# Patient Record
Sex: FEMALE | Race: WHITE | NOT HISPANIC OR LATINO | Employment: FULL TIME | URBAN - METROPOLITAN AREA
[De-identification: names, ages, dates, MRNs, and addresses within clinical notes are randomized per-mention and may not be internally consistent; named-entity substitution may affect disease eponyms.]

---

## 2021-05-24 ENCOUNTER — OFFICE VISIT (OUTPATIENT)
Dept: URGENT CARE | Facility: CLINIC | Age: 26
End: 2021-05-24

## 2021-05-24 VITALS
OXYGEN SATURATION: 98 % | RESPIRATION RATE: 16 BRPM | TEMPERATURE: 98.6 F | HEART RATE: 82 BPM | DIASTOLIC BLOOD PRESSURE: 80 MMHG | SYSTOLIC BLOOD PRESSURE: 128 MMHG

## 2021-05-24 DIAGNOSIS — J45.20 MILD INTERMITTENT ASTHMA WITHOUT COMPLICATION: ICD-10-CM

## 2021-05-24 DIAGNOSIS — Z71.3 ENCOUNTER FOR WEIGHT LOSS COUNSELING: ICD-10-CM

## 2021-05-24 DIAGNOSIS — J30.2 SEASONAL ALLERGIC RHINITIS, UNSPECIFIED TRIGGER: Primary | ICD-10-CM

## 2021-05-24 RX ORDER — ALBUTEROL SULFATE 90 UG/1
2 AEROSOL, METERED RESPIRATORY (INHALATION) EVERY 6 HOURS PRN
Qty: 18 G | Refills: 0 | Status: SHIPPED | OUTPATIENT
Start: 2021-05-24 | End: 2021-06-23

## 2021-05-24 RX ORDER — FLUTICASONE PROPIONATE 50 MCG
2 SPRAY, SUSPENSION (ML) NASAL DAILY
Qty: 9.9 ML | Refills: 0 | Status: SHIPPED | OUTPATIENT
Start: 2021-05-24 | End: 2021-06-23

## 2021-05-24 NOTE — PATIENT INSTRUCTIONS
Use Flonase nasal spray as prescribed  Continue taking an antihistamine  Use nasal saline rinses and recommend allergen avoidance  Use warm salt water gargles for sore throat  Use Ventolin inhaler if needed  Follow up with your PCP or return to the clinic if symptoms worsen or persist more than 3-5 days  Return tomorrow morning to obtain fasting labwork  Allergic Rhinitis   AMBULATORY CARE:   Allergic rhinitis , or hay fever, is swelling of the inside of your nose  The swelling is a reaction to allergens in the air  An allergen can be anything that causes an allergic reaction  Allergies to weeds, grass, trees, or mold often cause seasonal allergic rhinitis  Indoor dust mites, cockroaches, pet dander, or mold can also cause allergic rhinitis  Common signs and symptoms include the following:   · Sneezing    · Nasal congestion    · Runny nose    · Itchy nose, eyes, or mouth    · Red, watery eyes    · Postnasal drip (nasal drainage down the back of your throat)    · Cough or frequent throat clearing    · Feeling tired or lethargic    · Dark circles under your eyes    Call 911 for the following:   · You have chest pain or shortness of breath  Seek care immediately if:   · You have severe pain  · You cough up blood  Contact your healthcare provider if:   · You have a fever  · You have ear or sinus pain, or a headache  · Your symptoms get worse, even after treatment  · You have yellow, green, brown, or bloody mucus coming from your nose  · Your nose is bleeding or you have pain inside your nose  · You have trouble sleeping because of your symptoms  · You have questions or concerns about your condition or care  Treatment:   · Antihistamines  help reduce itching, sneezing, and a runny nose  Some antihistamines can make you sleepy  · Nasal steroids  help decrease inflammation in your nose  · Decongestants  help clear your stuffy nose      · Immunotherapy  may be needed if your symptoms are severe or other treatments do not work  Immunotherapy is used to inject an allergen into your skin  At first, the therapy contains tiny amounts of the allergen  Your healthcare provider will slowly increase the amount of allergen  This may help your body be less sensitive to the allergen and stop reacting to it  You may need immunotherapy for weeks or longer  Manage allergic rhinitis:  The best way to manage allergic rhinitis is to avoid allergens that can trigger your symptoms  Any of the following may help decrease your symptoms:  · Rinse your nose and sinuses  with a salt water solution or use a salt water nasal spray  This will help thin the mucus in your nose and rinse away pollen and dirt  It will also help reduce swelling so you can breathe normally  Ask your healthcare provider how often to rinse your nose  · Reduce exposure to dust mites  Wash sheets and towels in hot water every week  Cover your pillows and mattresses with allergen-free covers  Limit the number of stuffed animals and soft toys your child has  Wash your child's toys in hot water regularly  Vacuum weekly and use a vacuum  with an air filter  If possible, get rid of carpets and curtains  These collect dust and dust mites  · Reduce exposure to pollen  Keep windows and doors closed in your house and car  Stay inside when air pollution or the pollen count is high  Run your air conditioner on recycle, and change air filters often  Shower and wash your hair before bed every night to rinse away pollen  · Reduce exposure to pet dander  If possible, do not keep cats, dogs, birds, or other pets  If you do keep pets in your home, keep them out of bedrooms and carpeted rooms  Bathe them often  · Reduce exposure to mold  Do not spend time in basements  Choose artificial plants instead of live plants  Keep your home's humidity at less than 45%  Do not have ponds or standing water in your home or yard       · Do not smoke  Avoid others who smoke  Ask your healthcare provider for information if you currently smoke and need help to quit  Follow up with your healthcare provider as directed:  Write down your questions so you remember to ask them during your visits  © Copyright [address] Information is for End User's use only and may not be sold, redistributed or otherwise used for commercial purposes  All illustrations and images included in CareNotes® are the copyrighted property of A D A M , Inc  or [address] [place], [place] [address]spike Zimmer   The above information is an  only  It is not intended as medical advice for individual conditions or treatments  Talk to your doctor, nurse or pharmacist before following any medical regimen to see if it is safe and effective for you

## 2021-05-24 NOTE — ASSESSMENT & PLAN NOTE
History and exam findings consistent with pharyngitis due to post nasal drip  Rx written for Flonase nasal spray  Continue antihistamine  Recommend nasal saline spray and allergen avoidance  Follow up if symptoms worsen or persist

## 2021-05-24 NOTE — PROGRESS NOTES
[facility]        NAME: July Nettles is a 22 y o  female  : 1995    MRN: [id number]  DATE: May 24, 2021  TIME: 2:55 PM    Assessment and Plan   Seasonal allergic rhinitis, unspecified trigger [J30 2]  1  Seasonal allergic rhinitis, unspecified trigger  fluticasone (FLONASE) 50 mcg/act nasal spray   2  Mild intermittent asthma without complication  albuterol (Ventolin HFA) 90 mcg/act inhaler   3  Encounter for weight loss counseling           Patient Instructions     Use Flonase nasal spray as prescribed  Continue taking an antihistamine  Use nasal saline rinses and recommend allergen avoidance  Use warm salt water gargles for sore throat  Use Ventolin inhaler if needed  Follow up with your PCP or return to the clinic if symptoms worsen or persist more than 3-5 days  Return tomorrow morning to obtain fasting labwork  Proceed to  ER if symptoms worsen  Chief Complaint     Chief Complaint   Patient presents with    Sore Throat    Allergies         History of Present Illness       Head congestion, ear fullness & dizziness, post-nasal drip, rhinitis, sore throat, dry cough x 2 days  She moved to this area a few months ago from [place]  Hx asthma, mild asthma symptoms  No fevers or chills  No known sick contacts  She has had her COVID vaccines  She tried an allergy pill- did not help  Pt also would like advice about weight loss  She is eating healthy and exercising daily but has had difficulty loosing weight  Review of Systems   Review of Systems   Constitutional: Negative  HENT: Positive for congestion, postnasal drip, rhinorrhea and sore throat  Negative for sinus pressure, sinus pain and voice change  Eyes: Positive for itching  Respiratory: Positive for cough (dry) and chest tightness (mild- feels like asthma exacerbation)  Negative for wheezing  Cardiovascular: Negative  Musculoskeletal: Negative  Skin: Negative      Allergic/Immunologic: Positive for environmental allergies  All other systems reviewed and are negative  Current Medications       Current Outpatient Medications:     levonorgestrel (MIRENA) 20 MCG/24HR IUD, 1 each by Intrauterine route once, Disp: , Rfl:     albuterol (Ventolin HFA) 90 mcg/act inhaler, Inhale 2 puffs every 6 (six) hours as needed for wheezing, Disp: 18 g, Rfl: 0    fluticasone (FLONASE) 50 mcg/act nasal spray, 2 sprays into each nostril daily, Disp: 9 9 mL, Rfl: 0    Current Allergies     Allergies as of 05/24/2021    (No Known Allergies)            The following portions of the patient's history were reviewed and updated as appropriate: allergies, current medications, past family history, past medical history, past social history, past surgical history and problem list      Past Medical History:   Diagnosis Date    Allergic rhinitis     Asthma     POTS (postural orthostatic tachycardia syndrome)        Past Surgical History:   Procedure Laterality Date    ENDOMETRIAL ABLATION         History reviewed  No pertinent family history  Medications have been verified  Objective   /80 (BP Location: Right arm, Patient Position: Sitting, Cuff Size: Adult)   Pulse 82   Temp 98 6 °F (37 °C) (Tympanic)   Resp 16   SpO2 98%   No LMP recorded  Physical Exam     Physical Exam  Vitals signs reviewed  Constitutional:       Appearance: She is well-developed  HENT:      Head: Normocephalic and atraumatic  Jaw: There is normal jaw occlusion  Right Ear: Hearing, ear canal and external ear normal  A middle ear effusion is present  Left Ear: Hearing, ear canal and external ear normal  A middle ear effusion is present  Nose: Mucosal edema, congestion and rhinorrhea present  Rhinorrhea is clear  Right Turbinates: Swollen  Left Turbinates: Swollen  Left Sinus: No maxillary sinus tenderness  Mouth/Throat:      Lips: Pink        Mouth: Mucous membranes are moist  Dentition: Normal dentition  Pharynx: Uvula midline  Posterior oropharyngeal erythema (cobblestoning appearance to throat) present  No oropharyngeal exudate  Tonsils: No tonsillar exudate or tonsillar abscesses  1+ on the right  1+ on the left  Eyes:      Conjunctiva/sclera: Conjunctivae normal       Pupils: Pupils are equal, round, and reactive to light  Neck:      Musculoskeletal: Normal range of motion and neck supple  Cardiovascular:      Rate and Rhythm: Normal rate and regular rhythm  Heart sounds: Normal heart sounds  Pulmonary:      Effort: Pulmonary effort is normal       Breath sounds: Normal breath sounds  Lymphadenopathy:      Head:      Right side of head: Submandibular adenopathy present  Left side of head: Submandibular adenopathy present  Skin:     General: Skin is warm and dry  Neurological:      Mental Status: She is alert and oriented to person, place, and time     Psychiatric:         Behavior: Behavior normal

## 2021-05-24 NOTE — ASSESSMENT & PLAN NOTE
Weight loss strategies discussed with pt  labwork ordered to r/o medical cause of weight loss difficulty  Recommend pt continue to eat a healthy diet, engage in regular moderate exercise, and start adding moderate weight lifting to her regimen to build muscle

## 2021-05-24 NOTE — ASSESSMENT & PLAN NOTE
Reports of asthma symptoms with seasonal allergies, pt needs a refill of her albuterol inhaler  Asthma normally well controlled  Refill provided for Ventolin inhaler  Reasons to follow up reviewed with pt

## 2021-05-25 ENCOUNTER — APPOINTMENT (OUTPATIENT)
Dept: URGENT CARE | Facility: CLINIC | Age: 26
End: 2021-05-25

## 2021-05-25 DIAGNOSIS — Z71.3 ENCOUNTER FOR WEIGHT LOSS COUNSELING: ICD-10-CM

## 2021-05-25 DIAGNOSIS — Z00.00 PREVENTATIVE HEALTH CARE: Primary | ICD-10-CM

## 2021-05-25 DIAGNOSIS — J45.20 MILD INTERMITTENT ASTHMA WITHOUT COMPLICATION: ICD-10-CM

## 2021-05-25 LAB
25(OH)D3 SERPL-MCNC: 18.8 NG/ML (ref 30–100)
ALBUMIN SERPL BCP-MCNC: 3.7 G/DL (ref 3.5–5)
ALP SERPL-CCNC: 89 U/L (ref 46–116)
ALT SERPL W P-5'-P-CCNC: 35 U/L (ref 12–78)
ANION GAP SERPL CALCULATED.3IONS-SCNC: 6 MMOL/L (ref 4–13)
AST SERPL W P-5'-P-CCNC: 21 U/L (ref 5–45)
BASOPHILS # BLD AUTO: 0.05 THOUSANDS/ΜL (ref 0–0.1)
BASOPHILS NFR BLD AUTO: 1 % (ref 0–1)
BILIRUB SERPL-MCNC: 0.46 MG/DL (ref 0.2–1)
BUN SERPL-MCNC: 13 MG/DL (ref 5–25)
CALCIUM SERPL-MCNC: 8.9 MG/DL (ref 8.3–10.1)
CHLORIDE SERPL-SCNC: 108 MMOL/L (ref 100–108)
CHOLEST SERPL-MCNC: 134 MG/DL (ref 50–200)
CO2 SERPL-SCNC: 23 MMOL/L (ref 21–32)
CREAT SERPL-MCNC: 0.73 MG/DL (ref 0.6–1.3)
EOSINOPHIL # BLD AUTO: 0.17 THOUSAND/ΜL (ref 0–0.61)
EOSINOPHIL NFR BLD AUTO: 2 % (ref 0–6)
ERYTHROCYTE [DISTWIDTH] IN BLOOD BY AUTOMATED COUNT: 12.5 % (ref 11.6–15.1)
GFR SERPL CREATININE-BSD FRML MDRD: 115 ML/MIN/1.73SQ M
GLUCOSE P FAST SERPL-MCNC: 77 MG/DL (ref 65–99)
HCT VFR BLD AUTO: 43.3 % (ref 34.8–46.1)
HDLC SERPL-MCNC: 48 MG/DL
HGB BLD-MCNC: 14.3 G/DL (ref 11.5–15.4)
IMM GRANULOCYTES # BLD AUTO: 0.03 THOUSAND/UL (ref 0–0.2)
IMM GRANULOCYTES NFR BLD AUTO: 0 % (ref 0–2)
LDLC SERPL CALC-MCNC: 75 MG/DL (ref 0–100)
LYMPHOCYTES # BLD AUTO: 2.98 THOUSANDS/ΜL (ref 0.6–4.47)
LYMPHOCYTES NFR BLD AUTO: 31 % (ref 14–44)
MCH RBC QN AUTO: 30 PG (ref 26.8–34.3)
MCHC RBC AUTO-ENTMCNC: 33 G/DL (ref 31.4–37.4)
MCV RBC AUTO: 91 FL (ref 82–98)
MONOCYTES # BLD AUTO: 0.53 THOUSAND/ΜL (ref 0.17–1.22)
MONOCYTES NFR BLD AUTO: 6 % (ref 4–12)
NEUTROPHILS # BLD AUTO: 5.84 THOUSANDS/ΜL (ref 1.85–7.62)
NEUTS SEG NFR BLD AUTO: 60 % (ref 43–75)
NONHDLC SERPL-MCNC: 86 MG/DL
NRBC BLD AUTO-RTO: 0 /100 WBCS
PLATELET # BLD AUTO: 332 THOUSANDS/UL (ref 149–390)
PMV BLD AUTO: 9.6 FL (ref 8.9–12.7)
POTASSIUM SERPL-SCNC: 4 MMOL/L (ref 3.5–5.3)
PROT SERPL-MCNC: 7.9 G/DL (ref 6.4–8.2)
RBC # BLD AUTO: 4.77 MILLION/UL (ref 3.81–5.12)
SODIUM SERPL-SCNC: 137 MMOL/L (ref 136–145)
TRIGL SERPL-MCNC: 54 MG/DL
TSH SERPL DL<=0.05 MIU/L-ACNC: 1.39 UIU/ML (ref 0.36–3.74)
WBC # BLD AUTO: 9.6 THOUSAND/UL (ref 4.31–10.16)

## 2021-05-25 PROCEDURE — 82306 VITAMIN D 25 HYDROXY: CPT | Performed by: NURSE PRACTITIONER

## 2021-05-25 PROCEDURE — 80061 LIPID PANEL: CPT | Performed by: NURSE PRACTITIONER

## 2021-05-25 PROCEDURE — 85025 COMPLETE CBC W/AUTO DIFF WBC: CPT | Performed by: NURSE PRACTITIONER

## 2021-05-25 PROCEDURE — 84443 ASSAY THYROID STIM HORMONE: CPT | Performed by: NURSE PRACTITIONER

## 2021-05-25 PROCEDURE — 80053 COMPREHEN METABOLIC PANEL: CPT | Performed by: NURSE PRACTITIONER

## 2021-05-25 NOTE — PROGRESS NOTES
Pt has fasting labwork to be drawn  She has been fasting this morning  Labwork drawn and sent to the lab  Recommend pt establish care with a PCP, she is new to the area and does not have a PCP yet  Referral provided

## 2021-05-27 ENCOUNTER — TELEPHONE (OUTPATIENT)
Dept: URGENT CARE | Facility: CLINIC | Age: 26
End: 2021-05-27

## 2021-05-27 NOTE — TELEPHONE ENCOUNTER
Lab results discussed with pt  Recommend vitamin D supplement of 1000 IU daily with calcium and follow up with her PCP  All questions answered

## 2021-11-18 ENCOUNTER — OFFICE VISIT (OUTPATIENT)
Dept: URGENT CARE | Age: 26
End: 2021-11-18

## 2021-11-18 ENCOUNTER — APPOINTMENT (OUTPATIENT)
Dept: LAB | Age: 26
End: 2021-11-18

## 2021-11-18 DIAGNOSIS — Z00.8 SPECIAL EXAMINATIONS: ICD-10-CM

## 2021-11-18 DIAGNOSIS — Z00.8 SPECIAL EXAMINATIONS: Primary | ICD-10-CM

## 2021-11-18 LAB — RUBV IGG SERPL IA-ACNC: 52.3 IU/ML

## 2021-11-18 PROCEDURE — 86762 RUBELLA ANTIBODY: CPT

## 2021-11-18 PROCEDURE — 86480 TB TEST CELL IMMUN MEASURE: CPT

## 2021-11-18 PROCEDURE — 86787 VARICELLA-ZOSTER ANTIBODY: CPT

## 2021-11-18 PROCEDURE — 86765 RUBEOLA ANTIBODY: CPT

## 2021-11-18 PROCEDURE — 86735 MUMPS ANTIBODY: CPT

## 2021-11-18 PROCEDURE — 36415 COLL VENOUS BLD VENIPUNCTURE: CPT

## 2021-11-22 LAB
GAMMA INTERFERON BACKGROUND BLD IA-ACNC: 0.05 IU/ML
M TB IFN-G BLD-IMP: NEGATIVE
M TB IFN-G CD4+ BCKGRND COR BLD-ACNC: -0.01 IU/ML
M TB IFN-G CD4+ BCKGRND COR BLD-ACNC: 0 IU/ML
MEV IGG SER QL: NORMAL
MITOGEN IGNF BCKGRD COR BLD-ACNC: >10 IU/ML
VZV IGG SER IA-ACNC: NORMAL

## 2021-11-23 LAB — MUV IGG SER QL: ABNORMAL

## 2022-01-05 ENCOUNTER — OFFICE VISIT (OUTPATIENT)
Dept: OBGYN CLINIC | Facility: CLINIC | Age: 27
End: 2022-01-05
Payer: COMMERCIAL

## 2022-01-05 VITALS
BODY MASS INDEX: 35.19 KG/M2 | DIASTOLIC BLOOD PRESSURE: 76 MMHG | HEIGHT: 67 IN | WEIGHT: 224.2 LBS | SYSTOLIC BLOOD PRESSURE: 120 MMHG

## 2022-01-05 DIAGNOSIS — Z87.42 HISTORY OF ENDOMETRIOSIS: ICD-10-CM

## 2022-01-05 DIAGNOSIS — Z97.5 IUD (INTRAUTERINE DEVICE) IN PLACE: ICD-10-CM

## 2022-01-05 DIAGNOSIS — Z01.419 ENCOUNTER FOR GYNECOLOGICAL EXAMINATION WITHOUT ABNORMAL FINDING: Primary | ICD-10-CM

## 2022-01-05 PROCEDURE — 99385 PREV VISIT NEW AGE 18-39: CPT | Performed by: OBSTETRICS & GYNECOLOGY

## 2022-01-05 NOTE — PROGRESS NOTES
CC:  Annual exam    HPI: John English presents for annual gyn exam   Sherif Vilchis underwent laparoscopy back in March of last year through a previous gynecologist to discovered endometriosis  The op report is not available and we will send for this  In the meantime the patient was not advised to go on any adjunct medication  Sherif Vilchis does have an IUD which is  October  However, she wishes this removed this soon as possible  Her last Pap smear was last year and we will obtain records of this as well  Past Medical History:  Past Medical History:   Diagnosis Date    Endometriosis        Past Surgical History:  Past Surgical History:   Procedure Laterality Date    LAPAROSCOPIC ENDOMETRIOSIS FULGURATION  2021       Past OB/Gyn History:  Menstrual cycles, are absent secondary to the IUD  Denies any history of sexually transmitted infection  No history of abnormal pap smears  Her last pap smear was   ALLERGIES: No Known Allergies    MEDS:   Current Outpatient Medications:     levonorgestrel (MIRENA) 20 MCG/24HR IUD    Family History:  Family History   Family history unknown: Yes       Social History:  Social History     Socioeconomic History    Marital status: /Civil Union     Spouse name: Not on file    Number of children: Not on file    Years of education: Not on file    Highest education level: Not on file   Occupational History    Not on file   Tobacco Use    Smoking status: Never Smoker    Smokeless tobacco: Never Used   Vaping Use    Vaping Use: Never used   Substance and Sexual Activity    Alcohol use: Yes    Drug use: Never    Sexual activity: Yes     Partners: Male     Birth control/protection: I U D     Other Topics Concern    Not on file   Social History Narrative    Not on file     Social Determinants of Health     Financial Resource Strain: Not on file   Food Insecurity: Not on file   Transportation Needs: Not on file   Physical Activity: Not on file Stress: Not on file   Social Connections: Not on file   Intimate Partner Violence: Not on file   Housing Stability: Not on file         Review of Systems:  Gen:   Denies fatigue, chills, nausea, vomiting, fever  Skin: No rashes or discolorations of any concern  RESP: Denies SOB, no cough  CV: Denies chest pain or palpitations  Breasts: Denies masses, pain, skin changes and nipple discharge  GI: Denies abdominal pain, heartburn, nausea, vomiting, changes in bowel habits  : Denies dysuria, frequency, CVA tenderness, incontinence and hematuria  Genitalia: Denies abnormal vaginal discharge, external lesions, rashes, pelvic pain, pressure, abnormal bleeding  Rectal:  Denies pain, bleeding, hemorrhoids,    Physical Exam:  /76   Ht 5' 7" (1 702 m)   Wt 102 kg (224 lb 3 2 oz)   LMP  (LMP Unknown)   BMI 35 11 kg/m²    Gen: The patient was alert and oriented x3, pleasant well-appearing female in no acute distress  Neck:  Unremarkable, no lymphadenopathy, no thyromegaly, or tenderness  CV:  RRR, no murmurs  Resp:  Clear to auscultation bilaterally, no wheezing  Breasts: Symmetric  No dominant, discrete, fixed  or suspicious masses are noted  No skin or nipple changes  No palpable axillary nodes  No supraclavicular adenopathy  Abd:  Soft, overweight, nontender, nondistended, no masses or organomegaly  Back:  No CVA tenderness, no tenderness to palpation along spine  Pelvic:  Normal appearing external female genitalia, no visible lesions, no rashes  Vagina is free of discharge, normal vaginal epithelium, no abnormal  lesions, no evidence of prolapse anteriorly or posteriorly  Normal appearing cervix, mobile and nontender  A thin prep pap smear was not obtained  Uterus is normal size, mobile and, nontender  No palpable adnexal masses or tenderness  No anoperineal lesions  Skin:  No concerning lesions  Extremeties: No edema      Assessment & Plan:   1   Routine annual exam      RTO one year orPRN     2  Contraception, IUD which will be removed at the patient's discretion  3  History endometriosis according to patient  Will await arrival of op note to determine if the patient should consider adjunct medication      4  Pap smear obtained last year, awaiting results

## 2022-01-06 ENCOUNTER — PROCEDURE VISIT (OUTPATIENT)
Dept: OBGYN CLINIC | Facility: CLINIC | Age: 27
End: 2022-01-06
Payer: COMMERCIAL

## 2022-01-06 VITALS — HEIGHT: 67 IN | BODY MASS INDEX: 35.29 KG/M2 | WEIGHT: 224.87 LBS

## 2022-01-06 DIAGNOSIS — Z30.432 ENCOUNTER FOR REMOVAL OF INTRAUTERINE CONTRACEPTIVE DEVICE (IUD): Primary | ICD-10-CM

## 2022-01-06 PROCEDURE — 58301 REMOVE INTRAUTERINE DEVICE: CPT | Performed by: OBSTETRICS & GYNECOLOGY

## 2022-01-06 NOTE — PROGRESS NOTES
Iud removal    Date/Time: 1/6/2022 1:37 PM  Performed by: Grey Cannon MD  Authorized by: Grey Cannon MD   Universal Protocol:  Consent: Written consent obtained  Risks and benefits: risks, benefits and alternatives were discussed  Consent given by: patient  Time out: Immediately prior to procedure a "time out" was called to verify the correct patient, procedure, equipment, support staff and site/side marked as required    Patient understanding: patient states understanding of the procedure being performed  Patient consent: the patient's understanding of the procedure matches consent given  Procedure consent: procedure consent matches procedure scheduled  Relevant documents: relevant documents present and verified  Patient identity confirmed: verbally with patient      Procedure:     Removed with no complications: yes      Removal due to mechanical complications of IUD: no      Removal due to infection and inflammatory reaction: no      Other reason for removal:  Desire for pregnancy

## 2022-01-12 ENCOUNTER — HOSPITAL ENCOUNTER (EMERGENCY)
Facility: HOSPITAL | Age: 27
Discharge: HOME | End: 2022-01-12
Attending: EMERGENCY MEDICINE | Admitting: EMERGENCY MEDICINE
Payer: OTHER MISCELLANEOUS

## 2022-01-12 VITALS
DIASTOLIC BLOOD PRESSURE: 75 MMHG | SYSTOLIC BLOOD PRESSURE: 107 MMHG | OXYGEN SATURATION: 99 % | TEMPERATURE: 97.1 F | HEART RATE: 80 BPM | RESPIRATION RATE: 16 BRPM

## 2022-01-12 DIAGNOSIS — Z78.9 HEPATITIS B VACCINATION NOT UP TO DATE: ICD-10-CM
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
## 2022-01-12 DIAGNOSIS — Z57.8 EMPLOYEE EXPOSURE TO BODY FLUIDS: ICD-10-CM

## 2022-01-12 LAB
ALT SERPL W P-5'-P-CCNC: 35 U/L (ref 12–78)
HBV SURFACE AB SER-ACNC: >1000 MIU/ML
HBV SURFACE AG SER QL: NORMAL
HCV AB SER QL: NORMAL

## 2022-01-12 PROCEDURE — 90471 IMMUNIZATION ADMIN: CPT

## 2022-01-12 PROCEDURE — 99283 EMERGENCY DEPT VISIT LOW MDM: CPT

## 2022-01-12 PROCEDURE — 86706 HEP B SURFACE ANTIBODY: CPT | Performed by: PHYSICIAN ASSISTANT

## 2022-01-12 PROCEDURE — 86803 HEPATITIS C AB TEST: CPT | Performed by: PHYSICIAN ASSISTANT

## 2022-01-12 PROCEDURE — 99284 EMERGENCY DEPT VISIT MOD MDM: CPT | Performed by: PHYSICIAN ASSISTANT

## 2022-01-12 PROCEDURE — 36415 COLL VENOUS BLD VENIPUNCTURE: CPT | Performed by: PHYSICIAN ASSISTANT

## 2022-01-12 PROCEDURE — 96372 THER/PROPH/DIAG INJ SC/IM: CPT

## 2022-01-12 PROCEDURE — 84460 ALANINE AMINO (ALT) (SGPT): CPT | Performed by: PHYSICIAN ASSISTANT

## 2022-01-12 PROCEDURE — 90371 HEP B IG IM: CPT | Performed by: PHYSICIAN ASSISTANT

## 2022-01-12 PROCEDURE — 87340 HEPATITIS B SURFACE AG IA: CPT | Performed by: PHYSICIAN ASSISTANT

## 2022-01-12 PROCEDURE — 90746 HEPB VACCINE 3 DOSE ADULT IM: CPT | Performed by: PHYSICIAN ASSISTANT

## 2022-01-12 PROCEDURE — 87389 HIV-1 AG W/HIV-1&-2 AB AG IA: CPT | Performed by: PHYSICIAN ASSISTANT

## 2022-01-12 RX ADMIN — HEPATITIS B VACCINE (RECOMBINANT) 1 ML: 20 INJECTION, SUSPENSION INTRAMUSCULAR at 15:47

## 2022-01-12 RX ADMIN — HEPATITIS B IMMUNE GLOBULIN (HUMAN) 6.12 ML: 1560 LIQUID INTRAMUSCULAR at 15:49

## 2022-01-12 SDOH — HEALTH STABILITY - PHYSICAL HEALTH: OCCUPATIONAL EXPOSURE TO OTHER RISK FACTORS: Z57.8

## 2022-01-12 NOTE — ED NOTES
Vaccine not in accudose  Pharmacy called and is tubing to zone 1       Sj Barrios, ANDRE  01/12/22 9841

## 2022-01-12 NOTE — ED PROVIDER NOTES
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  Patient is a 33 y/o female presenting to the ED for evaluation of needlestick injury  Pt states PTA she was drawing blood when she was accidentally stuck by used needle to right 2nd digit distal phalanx, sustaining puncture wound  Patient states she received her first hepatitis vaccine on 11/18/21, did not receive second dose  Patient received tdap on 11/18/21    No other complaints at this time  None       Past Medical History:   Diagnosis Date    Endometriosis        Past Surgical History:   Procedure Laterality Date    LAPAROSCOPIC ENDOMETRIOSIS FULGURATION  03/03/2021       Family History   Family history unknown: Yes     I have reviewed and agree with the history as documented  E-Cigarette/Vaping    E-Cigarette Use Never User      E-Cigarette/Vaping Substances    Nicotine No     THC No     CBD No     Flavoring No     Other No      Social History     Tobacco Use    Smoking status: Never Smoker    Smokeless tobacco: Never Used   Vaping Use    Vaping Use: Never used   Substance Use Topics    Alcohol use: Yes    Drug use: Never       Review of Systems   Skin: Positive for wound  All other systems reviewed and are negative  Physical Exam  Physical Exam  Constitutional:       Appearance: Normal appearance  HENT:      Head: Normocephalic and atraumatic  Right Ear: External ear normal       Left Ear: External ear normal       Nose: Nose normal       Mouth/Throat:      Lips: Pink  Mouth: Mucous membranes are moist    Eyes:      Extraocular Movements: Extraocular movements intact  Conjunctiva/sclera: Conjunctivae normal    Pulmonary:      Effort: No tachypnea or respiratory distress  Musculoskeletal:      Cervical back: Normal range of motion and neck supple  Skin:     General: Skin is warm        Capillary Refill: Capillary refill takes less than 2 seconds  Neurological:      Mental Status: She is alert and oriented to person, place, and time  GCS: GCS eye subscore is 4  GCS verbal subscore is 5  GCS motor subscore is 6  Psychiatric:         Mood and Affect: Mood and affect normal          Speech: Speech normal          Vital Signs  ED Triage Vitals [01/12/22 1259]   Temperature Pulse Respirations Blood Pressure SpO2   (!) 97 1 °F (36 2 °C) 80 16 107/75 99 %      Temp Source Heart Rate Source Patient Position - Orthostatic VS BP Location FiO2 (%)   Tympanic Monitor Sitting Left arm --      Pain Score       No Pain           Vitals:    01/12/22 1259   BP: 107/75   Pulse: 80   Patient Position - Orthostatic VS: Sitting         Visual Acuity      ED Medications  Medications   Hepatitis B Immune Globulin SOLN 6 12 mL (6 12 mL Intramuscular Given 1/12/22 1549)   hepatitis B vaccine (ENGERIX-B) IM injection 1 mL (1 mL Intramuscular Given 1/12/22 1547)       Diagnostic Studies  Results Reviewed     Procedure Component Value Units Date/Time    Hepatitis B surface antigen [[id number]]  (Normal) Collected: 01/12/22 1307    Lab Status: Final result Specimen: Blood from Arm, Right Updated: 01/12/22 1736     Hepatitis B Surface Ag Non-reactive    Hepatitis C antibody [[id number]]  (Normal) Collected: 01/12/22 1307    Lab Status: Final result Specimen: Blood from Arm, Right Updated: 01/12/22 1736     Hepatitis C Ab Non-reactive    Hepatitis B surface antibody [[id number]] Collected: 01/12/22 1307    Lab Status: Final result Specimen: Blood from Arm, Right Updated: 01/12/22 1736     Hep B S Ab >1,000 00 mIU/mL     ALT [[id number]]  (Normal) Collected: 01/12/22 1307    Lab Status: Final result Specimen: Blood from Arm, Right Updated: 01/12/22 1329     ALT 35 U/L     HIV 1/2 Antigen/Antibody (4th Generation) w Reflex [facility] [[id number]] Collected: 01/12/22 1307    Lab Status:  In process Specimen: Blood from Arm, Right Updated: 01/12/22 1311 No orders to display              Procedures  Procedures         ED Course  ED Course as of 01/13/22 0811   Wed Jan 12, 2022   1330 Patient only received 1st dose of hepatitis B vaccine on 11/18/21  Discussed with Infectious Disease, recommend obtaining hepatitis B result from source patient  If positive for hepatitis B - give patient hepatitis B IG and vaccine  If negative, have patient follow hepatitis B vaccine schedule                                SBIRT 20yo+      Most Recent Value   SBIRT (24 yo +)    In order to provide better care to our patients, we are screening all of our patients for alcohol and drug use  Would it be okay to ask you these screening questions? 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  Hepatitis B vaccination not up to date: new and does not require workup  Needlestick injury accident with exposure to body fluid: new and does not require workup  Diagnosis management comments: Patient is a 33 y/o female presenting to the ED for evaluation of needlestick injury  Patient states she received her first hepatitis vaccine on 11/18/21, did not receive second dose  Patient received tdap on 11/18/21    Per ID - recommend hepatitis B IG and vaccine and pt to follow with vaccine schedule   Will obtain baseline exposure panel - pt to f/u with [facility] in 72 hours to review results  Source patient exposure panel obtained as well    Patient verbalizes understanding and agrees with plan  The management plan was discussed in detail with the patient at bedside and all questions were answered  Prior to discharge, I provided both verbal and written instructions  I discussed with the patient the signs and symptoms for which to return to the emergency department  All questions were answered and patient was comfortable with the plan of care and discharged to home  The patient agrees to return to the Emergency Department for concerns and/or progression of illness  Disposition  Final diagnoses:   Needlestick injury accident with exposure to body fluid   Hepatitis B vaccination not up to date   Employee 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     Time reflects when diagnosis was documented in both MDM as applicable and the Disposition within this note     Time User Action Codes Description Comment    1/12/2022  3:48 PM Jitendra Arce Javed Hamilton  1XXA] Needlestick injury accident with exposure to body fluid     1/12/2022  3:49 PM Jitendra Otero Add [Z78 9] Hepatitis B vaccination not up to date     1/12/2022  3:49 PM Jitendra Otero Add [Z57 8] Employee 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

## 2022-01-12 NOTE — DISCHARGE INSTRUCTIONS
Call [facility] in 72 hours to review baseline labs  Complete hepatitis B vaccine schedule (third dose)

## 2022-01-13 LAB — HIV 1+2 AB+HIV1 P24 AG SERPL QL IA: NORMAL

## 2022-02-06 ENCOUNTER — HOSPITAL ENCOUNTER (EMERGENCY)
Facility: HOSPITAL | Age: 27
Discharge: HOME | End: 2022-02-06
Attending: EMERGENCY MEDICINE | Admitting: EMERGENCY MEDICINE
Payer: COMMERCIAL

## 2022-02-06 ENCOUNTER — APPOINTMENT (EMERGENCY)
Dept: ULTRASOUND IMAGING | Facility: HOSPITAL | Age: 27
End: 2022-02-06
Payer: COMMERCIAL

## 2022-02-06 VITALS
SYSTOLIC BLOOD PRESSURE: 140 MMHG | HEART RATE: 88 BPM | WEIGHT: 220 LBS | DIASTOLIC BLOOD PRESSURE: 72 MMHG | OXYGEN SATURATION: 99 % | BODY MASS INDEX: 34.46 KG/M2 | RESPIRATION RATE: 18 BRPM | TEMPERATURE: 98.7 F

## 2022-02-06 DIAGNOSIS — O20.0 THREATENED MISCARRIAGE IN EARLY PREGNANCY: ICD-10-CM

## 2022-02-06 DIAGNOSIS — N93.9 VAGINAL BLEEDING: Primary | ICD-10-CM

## 2022-02-06 LAB
ABO GROUP BLD: NORMAL
ABO GROUP BLD: NORMAL
ALBUMIN SERPL BCP-MCNC: 3.8 G/DL (ref 3.5–5)
ALP SERPL-CCNC: 91 U/L (ref 46–116)
ALT SERPL W P-5'-P-CCNC: 34 U/L (ref 12–78)
ANION GAP SERPL CALCULATED.3IONS-SCNC: 5 MMOL/L (ref 4–13)
AST SERPL W P-5'-P-CCNC: 18 U/L (ref 5–45)
B-HCG SERPL-ACNC: 47 MIU/ML
BACTERIA UR QL AUTO: NORMAL /HPF
BASOPHILS # BLD AUTO: 0.05 THOUSANDS/ΜL (ref 0–0.1)
BASOPHILS NFR BLD AUTO: 0 % (ref 0–1)
BILIRUB SERPL-MCNC: 0.21 MG/DL (ref 0.2–1)
BILIRUB UR QL STRIP: NEGATIVE
BLD GP AB SCN SERPL QL: NEGATIVE
BUN SERPL-MCNC: 12 MG/DL (ref 5–25)
CALCIUM SERPL-MCNC: 8.8 MG/DL (ref 8.3–10.1)
CHLORIDE SERPL-SCNC: 105 MMOL/L (ref 100–108)
CLARITY UR: ABNORMAL
CO2 SERPL-SCNC: 28 MMOL/L (ref 21–32)
COLOR UR: YELLOW
CREAT SERPL-MCNC: 0.76 MG/DL (ref 0.6–1.3)
EOSINOPHIL # BLD AUTO: 0.23 THOUSAND/ΜL (ref 0–0.61)
EOSINOPHIL NFR BLD AUTO: 2 % (ref 0–6)
ERYTHROCYTE [DISTWIDTH] IN BLOOD BY AUTOMATED COUNT: 12 % (ref 11.6–15.1)
EXT PREG TEST URINE: NEGATIVE
EXT. CONTROL ED NAV: NORMAL
GFR SERPL CREATININE-BSD FRML MDRD: 108 ML/MIN/1.73SQ M
GLUCOSE SERPL-MCNC: 95 MG/DL (ref 65–140)
GLUCOSE UR STRIP-MCNC: NEGATIVE MG/DL
HCT VFR BLD AUTO: 39.7 % (ref 34.8–46.1)
HGB BLD-MCNC: 13.8 G/DL (ref 11.5–15.4)
HGB UR QL STRIP.AUTO: ABNORMAL
IMM GRANULOCYTES # BLD AUTO: 0.03 THOUSAND/UL (ref 0–0.2)
IMM GRANULOCYTES NFR BLD AUTO: 0 % (ref 0–2)
KETONES UR STRIP-MCNC: NEGATIVE MG/DL
LEUKOCYTE ESTERASE UR QL STRIP: ABNORMAL
LYMPHOCYTES # BLD AUTO: 2.9 THOUSANDS/ΜL (ref 0.6–4.47)
LYMPHOCYTES NFR BLD AUTO: 24 % (ref 14–44)
MCH RBC QN AUTO: 30.5 PG (ref 26.8–34.3)
MCHC RBC AUTO-ENTMCNC: 34.8 G/DL (ref 31.4–37.4)
MCV RBC AUTO: 88 FL (ref 82–98)
MONOCYTES # BLD AUTO: 0.86 THOUSAND/ΜL (ref 0.17–1.22)
MONOCYTES NFR BLD AUTO: 7 % (ref 4–12)
NEUTROPHILS # BLD AUTO: 7.9 THOUSANDS/ΜL (ref 1.85–7.62)
NEUTS SEG NFR BLD AUTO: 67 % (ref 43–75)
NITRITE UR QL STRIP: NEGATIVE
NON-SQ EPI CELLS URNS QL MICRO: NORMAL /HPF
NRBC BLD AUTO-RTO: 0 /100 WBCS
PH UR STRIP.AUTO: 6 [PH] (ref 4.5–8)
PLATELET # BLD AUTO: 352 THOUSANDS/UL (ref 149–390)
PMV BLD AUTO: 8.8 FL (ref 8.9–12.7)
POTASSIUM SERPL-SCNC: 3.8 MMOL/L (ref 3.5–5.3)
PROT SERPL-MCNC: 7.8 G/DL (ref 6.4–8.2)
PROT UR STRIP-MCNC: NEGATIVE MG/DL
RBC # BLD AUTO: 4.53 MILLION/UL (ref 3.81–5.12)
RBC #/AREA URNS AUTO: NORMAL /HPF
RH BLD: NEGATIVE
RH BLD: NEGATIVE
SODIUM SERPL-SCNC: 138 MMOL/L (ref 136–145)
SP GR UR STRIP.AUTO: 1.02 (ref 1–1.03)
SPECIMEN EXPIRATION DATE: NORMAL
UROBILINOGEN UR QL STRIP.AUTO: 0.2 E.U./DL
WBC # BLD AUTO: 11.97 THOUSAND/UL (ref 4.31–10.16)
WBC #/AREA URNS AUTO: NORMAL /HPF

## 2022-02-06 PROCEDURE — 81001 URINALYSIS AUTO W/SCOPE: CPT

## 2022-02-06 PROCEDURE — 87086 URINE CULTURE/COLONY COUNT: CPT

## 2022-02-06 PROCEDURE — 84702 CHORIONIC GONADOTROPIN TEST: CPT | Performed by: PHYSICIAN ASSISTANT

## 2022-02-06 PROCEDURE — 76856 US EXAM PELVIC COMPLETE: CPT

## 2022-02-06 PROCEDURE — 86850 RBC ANTIBODY SCREEN: CPT | Performed by: PHYSICIAN ASSISTANT

## 2022-02-06 PROCEDURE — 96372 THER/PROPH/DIAG INJ SC/IM: CPT

## 2022-02-06 PROCEDURE — 85025 COMPLETE CBC W/AUTO DIFF WBC: CPT | Performed by: PHYSICIAN ASSISTANT

## 2022-02-06 PROCEDURE — 36415 COLL VENOUS BLD VENIPUNCTURE: CPT | Performed by: PHYSICIAN ASSISTANT

## 2022-02-06 PROCEDURE — 99284 EMERGENCY DEPT VISIT MOD MDM: CPT | Performed by: EMERGENCY MEDICINE

## 2022-02-06 PROCEDURE — 80053 COMPREHEN METABOLIC PANEL: CPT | Performed by: PHYSICIAN ASSISTANT

## 2022-02-06 PROCEDURE — 99284 EMERGENCY DEPT VISIT MOD MDM: CPT

## 2022-02-06 PROCEDURE — 81025 URINE PREGNANCY TEST: CPT | Performed by: EMERGENCY MEDICINE

## 2022-02-06 PROCEDURE — 86900 BLOOD TYPING SEROLOGIC ABO: CPT | Performed by: PHYSICIAN ASSISTANT

## 2022-02-06 PROCEDURE — 76830 TRANSVAGINAL US NON-OB: CPT

## 2022-02-06 PROCEDURE — 86901 BLOOD TYPING SEROLOGIC RH(D): CPT | Performed by: PHYSICIAN ASSISTANT

## 2022-02-06 RX ADMIN — HUMAN RHO(D) IMMUNE GLOBULIN 300 MCG: 300 INJECTION, SOLUTION INTRAMUSCULAR at 21:04

## 2022-02-06 NOTE — ED PROVIDER NOTES
History  Chief Complaint   Patient presents with    Vaginal Bleeding - Pregnant     5-6 weeks pregnant, cramping and spotting     71-year-old female  approximately 5 weeks gestational age based on day of expected conception of 2022 presents with pelvic cramping vaginal bleeding  Patient states that she has a history of endometriosis  She had an intrauterine device in place until  when it was removed in her OB GYNs office  She does not believe that she did a pregnancy test at that time  Patient did have some vaginal bleeding after removal of the IUD and suspected this was a menstrual period  Patient endorses having intercourse on   Patient took 3 home pregnancy tests which have all been faintly positive  Today she developed pelvic cramping and bleeding  Patient states that her menstrual cycles are normally very heavy  This bleeding was more like spotting  She denies passing clots  She has had breast tenderness and nausea  She is not currently taking hormonal therapy for pregnancy  She reports that she is Rh negative  History provided by:  Patient and spouse   used: No    Vaginal Bleeding  Quality:  Bright red, spotting and lighter than menses  Severity:  Mild  Onset quality:  Sudden  Duration:  1 day  Timing:  Constant  Progression:  Unchanged  Chronicity:  New  Menstrual history:  Irregular (recent removal of IUD 22)  Possible pregnancy: yes    Relieved by:  Nothing  Worsened by:  Nothing  Ineffective treatments:  None tried  Associated symptoms: fatigue and nausea    Associated symptoms: no back pain, no dysuria and no fever    Risk factors: hx of endometriosis    Risk factors: no hx of ectopic pregnancy and no prior miscarriage        Prior to Admission Medications   Prescriptions Last Dose Informant Patient Reported?  Taking?   fluticasone (FLONASE) 50 mcg/act nasal spray   No No   Si sprays into each nostril daily   levonorgestrel (MIRENA) 20 MCG/24HR IUD   Yes No   Si each by Intrauterine route once      Facility-Administered Medications: None       Past Medical History:   Diagnosis Date    Allergic rhinitis     Asthma     Endometriosis     POTS (postural orthostatic tachycardia syndrome)        Past Surgical History:   Procedure Laterality Date    ENDOMETRIAL ABLATION      LAPAROSCOPIC ENDOMETRIOSIS FULGURATION  2021       Family History   Family history unknown: Yes     I have reviewed and agree with the history as documented  E-Cigarette/Vaping    E-Cigarette Use Never User      E-Cigarette/Vaping Substances    Nicotine No     THC No     CBD No     Flavoring No     Other No      Social History     Tobacco Use    Smoking status: Never Smoker    Smokeless tobacco: Never Used   Vaping Use    Vaping Use: Never used   Substance Use Topics    Alcohol use: Yes    Drug use: Never       Review of Systems   Constitutional: Positive for fatigue  Negative for fever  Respiratory: Negative for shortness of breath  Gastrointestinal: Positive for nausea  Genitourinary: Positive for pelvic pain and vaginal bleeding  Negative for dysuria  Musculoskeletal: Negative for back pain  Neurological: Negative for weakness  All other systems reviewed and are negative  Physical Exam  Physical Exam  Vitals reviewed  Constitutional:       General: She is not in acute distress  Appearance: She is well-developed  HENT:      Head: Normocephalic  Nose: Nose normal       Mouth/Throat:      Pharynx: No oropharyngeal exudate  Eyes:      Conjunctiva/sclera: Conjunctivae normal       Pupils: Pupils are equal, round, and reactive to light  Cardiovascular:      Rate and Rhythm: Normal rate and regular rhythm  Heart sounds: Normal heart sounds  Pulmonary:      Effort: Pulmonary effort is normal       Breath sounds: Normal breath sounds     Abdominal:      General: Bowel sounds are normal  There is no distension  Palpations: Abdomen is soft  Tenderness: There is abdominal tenderness in the right lower quadrant, suprapubic area and left lower quadrant  There is no guarding or rebound  Musculoskeletal:         General: No tenderness or deformity  Normal range of motion  Cervical back: Normal range of motion and neck supple  Lymphadenopathy:      Cervical: No cervical adenopathy  Skin:     General: Skin is warm and dry  Findings: No rash  Neurological:      Mental Status: She is alert and oriented to person, place, and time  Cranial Nerves: No cranial nerve deficit  Sensory: No sensory deficit  Motor: No abnormal muscle tone  Coordination: Coordination normal       Gait: Gait normal       Deep Tendon Reflexes: Reflexes are normal and symmetric  Psychiatric:         Attention and Perception: Attention normal          Mood and Affect: Mood is anxious  Speech: Speech normal          Behavior: Behavior normal          Thought Content:  Thought content normal          Cognition and Memory: Cognition and memory normal          Judgment: Judgment normal          Vital Signs  ED Triage Vitals   Temperature Pulse Respirations Blood Pressure SpO2   02/06/22 2050 02/06/22 1801 02/06/22 1801 02/06/22 1801 02/06/22 1801   98 7 °F (37 1 °C) (!) 106 16 147/88 99 %      Temp Source Heart Rate Source Patient Position - Orthostatic VS BP Location FiO2 (%)   02/06/22 2050 02/06/22 1801 -- -- --   Oral Monitor         Pain Score       02/06/22 1801       3           Vitals:    02/06/22 1801 02/06/22 2117   BP: 147/88 140/72   Pulse: (!) 106 88         Visual Acuity      ED Medications  Medications   Rho(D) immune globulin (RHOGAM ULTRA-FILTERED PLUS) IM injection 300 mcg (300 mcg Intramuscular Given 2/6/22 2104)       Diagnostic Studies  Results Reviewed     Procedure Component Value Units Date/Time    hCG, quantitative [[id number]]  (Abnormal) Collected: 02/06/22 1904    Lab Status: Final result Specimen: Blood from Arm, Right Updated: 02/06/22 2004     HCG, Quant 47 mIU/mL     Narrative:       Expected Ranges:     Approximate               Approximate HCG  Gestation age          Concentration ( mIU/mL)  _____________          ______________________   Trevon Timmons                      HCG values  0 2-1                       5-50  1-2                           2-3                         100-5000  3-4                         500-37988  4-5                         1000-88199  5-6                         02394-790395  6-8                         68820-345352  8-12                        12750-890429      Comprehensive metabolic panel [[id number]] Collected: 02/06/22 1904    Lab Status: Final result Specimen: Blood from Arm, Right Updated: 02/06/22 2000     Sodium 138 mmol/L      Potassium 3 8 mmol/L      Chloride 105 mmol/L      CO2 28 mmol/L      ANION GAP 5 mmol/L      BUN 12 mg/dL      Creatinine 0 76 mg/dL      Glucose 95 mg/dL      Calcium 8 8 mg/dL      AST 18 U/L      ALT 34 U/L      Alkaline Phosphatase 91 U/L      Total Protein 7 8 g/dL      Albumin 3 8 g/dL      Total Bilirubin 0 21 mg/dL      eGFR 108 ml/min/1 73sq m     Narrative:      [place] guidelines for Chronic Kidney Disease (CKD):     Stage 1 with normal or high GFR (GFR > 90 mL/min/1 73 square meters)    Stage 2 Mild CKD (GFR = 60-89 mL/min/1 73 square meters)    Stage 3A Moderate CKD (GFR = 45-59 mL/min/1 73 square meters)    Stage 3B Moderate CKD (GFR = 30-44 mL/min/1 73 square meters)    Stage 4 Severe CKD (GFR = 15-29 mL/min/1 73 square meters)    Stage 5 End Stage CKD (GFR <15 mL/min/1 73 square meters)  Note: GFR calculation is accurate only with a steady state creatinine    Urine Microscopic [[id number]]  (Normal) Collected: 02/06/22 1843    Lab Status: Final result Specimen: Urine, Clean Catch Updated: 02/06/22 1918     RBC, UA 1-2 /hpf      WBC, UA 2-4 /hpf      Epithelial Cells Occasional /hpf      Bacteria, UA Occasional /hpf     CBC and differential [[id number]]  (Abnormal) Collected: 02/06/22 1904    Lab Status: Final result Specimen: Blood from Arm, Right Updated: 02/06/22 1917     WBC 11 97 Thousand/uL      RBC 4 53 Million/uL      Hemoglobin 13 8 g/dL      Hematocrit 39 7 %      MCV 88 fL      MCH 30 5 pg      MCHC 34 8 g/dL      RDW 12 0 %      MPV 8 8 fL      Platelets 679 Thousands/uL      nRBC 0 /100 WBCs      Neutrophils Relative 67 %      Immat GRANS % 0 %      Lymphocytes Relative 24 %      Monocytes Relative 7 %      Eosinophils Relative 2 %      Basophils Relative 0 %      Neutrophils Absolute 7 90 Thousands/µL      Immature Grans Absolute 0 03 Thousand/uL      Lymphocytes Absolute 2 90 Thousands/µL      Monocytes Absolute 0 86 Thousand/µL      Eosinophils Absolute 0 23 Thousand/µL      Basophils Absolute 0 05 Thousands/µL     Urine culture [[id number]] Collected: 02/06/22 1843    Lab Status:  In process Specimen: Urine, Clean Catch Updated: 02/06/22 1849    POCT pregnancy, urine [[id number]]  (Normal) Resulted: 02/06/22 1847    Lab Status: Final result Specimen: Urine Updated: 02/06/22 1847     EXT PREG TEST UR (Ref: Negative) negative     Control valid    Urine Macroscopic, POC [[id number]]  (Abnormal) Collected: 02/06/22 1843    Lab Status: Final result Specimen: Urine Updated: 02/06/22 1845     Color, UA Yellow     Clarity, UA Slightly Cloudy     pH, UA 6 0     Leukocytes, UA Small     Nitrite, UA Negative     Protein, UA Negative mg/dl      Glucose, UA Negative mg/dl      Ketones, UA Negative mg/dl      Urobilinogen, UA 0 2 E U /dl      Bilirubin, UA Negative     Blood, UA Large     Specific Gravity, UA 1 025    Narrative:      CLINITEK RESULT                 US pelvis complete w transvaginal   Final Result by Adelfo Briscoe MD (02/06 1936)       Normal                       Workstation performed: [id number]                    Procedures  Procedures         ED Course MDM  Number of Diagnoses or Management Options  Threatened miscarriage in early pregnancy: new and requires workup     Amount and/or Complexity of Data Reviewed  Clinical lab tests: ordered and reviewed  Tests in the radiology section of CPT®: ordered and reviewed  Decide to obtain previous medical records or to obtain history from someone other than the patient: yes  Obtain history from someone other than the patient: yes  Independent visualization of images, tracings, or specimens: yes    Risk of Complications, Morbidity, and/or Mortality  General comments: 19-year-old female presents with vaginal bleeding after having positive home pregnancy tests  Patient's beta hCG quant is 47  It is unclear if this is secondary to a miscarriage or if this is early pregnancy as there is no IUP demonstrated on ultrasound, patient will need close follow-up and repeat ultrasound if beta hCG continues to rise     Patient does have light vaginal bleeding - we did discuss diagnosis of threatened miscarriage  Patient did receive RhoGAM while in the department due to her Rh negative status  Patient does have an OBGYN established has an appointment scheduled for March  She will contact the practice for follow-up, I recommend recommend she have a repeat beta hCG quant in 48 hours, results to be reviewed by OBGYN     Discussed signs and symptoms return to the emergency department      Patient Progress  Patient progress: stable      Disposition  Final diagnoses:   Threatened miscarriage in early pregnancy     Time reflects when diagnosis was documented in both MDM as applicable and the Disposition within this note     Time User Action Codes Description Comment    2/6/2022  7:05 PM Bentley Valles [N93 9] Vaginal bleeding     2/6/2022  8:49 PM Blanca Wolff Add [O20 0] Threatened miscarriage     2/6/2022  8:49 PM Blanca Wolff Add [O20 0] Threatened miscarriage in early pregnancy     2/6/2022  8:49 PM Lilli Garcíaut Modify [N93 9] Vaginal bleeding     2/6/2022  8:49 PM Blanca García Remove [O20 0] Threatened miscarriage       ED Disposition     ED Disposition Condition Date/Time Comment    Discharge Stable Sun Feb 6, 2022  8:49 PM Lacy Cha discharge to home/self care  Follow-up Information     Follow up With Specialties Details Why Marisol Barth MD Obstetrics and Gynecology Schedule an appointment as soon as possible for a visit in 2 days For recheck of current symptoms [place] [id number] [facility] [address] [place] [address]  [phone]            Discharge Medication List as of 2/6/2022  8:55 PM      CONTINUE these medications which have NOT CHANGED    Details   fluticasone (FLONASE) 50 mcg/act nasal spray 2 sprays into each nostril daily, Starting Mon 5/24/2021, Until Wed 6/23/2021, Normal      levonorgestrel (MIRENA) 20 MCG/24HR IUD 1 each by Intrauterine route once, Historical Med             Outpatient Discharge Orders   hCG, quantitative   Standing Status: Future Standing Exp   Date: 02/06/23       PDMP Review     None          ED Provider  Electronically Signed by           Martin Garcia DO  02/06/22 2685

## 2022-02-06 NOTE — Clinical Note
Scotty Penn was seen and treated in our emergency department on 2/6/2022  Diagnosis:     Boom Vyas    She may return on this date: 02/09/2022         If you have any questions or concerns, please don't hesitate to call        Stephanie Tabor RN    ______________________________           _______________          _______________  Hospital Representative                              Date                                Time

## 2022-02-06 NOTE — Clinical Note
Manjeet Baca was seen and treated in our emergency department on 2/6/2022  Diagnosis:     Chadjono Desronnie    She may return on this date: 02/09/2022         If you have any questions or concerns, please don't hesitate to call        Pernell Weston RN    ______________________________           _______________          _______________  Hospital Representative                              Date                                Time

## 2022-02-06 NOTE — Clinical Note
Anneliseyoko Rylie was seen and treated in our emergency department on 2/6/2022  Diagnosis:     Margretalyssa Lopez    She may return on this date: 02/09/2022         If you have any questions or concerns, please don't hesitate to call        Cassie Steve RN    ______________________________           _______________          _______________  Hospital Representative                              Date                                Time

## 2022-02-07 ENCOUNTER — TELEPHONE (OUTPATIENT)
Dept: OBGYN CLINIC | Facility: CLINIC | Age: 27
End: 2022-02-07

## 2022-02-07 NOTE — TELEPHONE ENCOUNTER
Pt new to us  Was in ED yesterday for probable miscarriage  Had rhogam  Had iud removed 1/6   Hcg 47 and will have another tomorrow  Bleeding like a normal period  Will await hcg tomorrow and will go from there  This is FYI and if I missed anything  Has an appt Mar 1   Thanks

## 2022-02-07 NOTE — TELEPHONE ENCOUNTER
Patient is not established  And we did not see her in ER  Would recommend we move up her visit  Really can't manage her miscarriage and quants without seeing her

## 2022-02-07 NOTE — TELEPHONE ENCOUNTER
Pt was seen yesterday in ER for miscarriage  She is cramping and bleeding like an normal period  She had set up an appt with us for March (has previously seen [facility])  Pt seemed upset about this  Please advise

## 2022-02-07 NOTE — TELEPHONE ENCOUNTER
Pt contracted and informed as directed  Pt agreed to plan of action as she wants to transfer care  Pt scheduled for 2:20 in [facility] with provider ed on 02/10/22  Pt agreed

## 2022-02-08 ENCOUNTER — APPOINTMENT (OUTPATIENT)
Dept: LAB | Age: 27
End: 2022-02-08
Payer: COMMERCIAL

## 2022-02-08 DIAGNOSIS — O20.0 THREATENED MISCARRIAGE IN EARLY PREGNANCY: ICD-10-CM

## 2022-02-08 LAB — B-HCG SERPL-ACNC: 21 MIU/ML

## 2022-02-08 PROCEDURE — 36415 COLL VENOUS BLD VENIPUNCTURE: CPT

## 2022-02-08 PROCEDURE — 84702 CHORIONIC GONADOTROPIN TEST: CPT

## 2022-02-09 LAB
BACTERIA UR CULT: ABNORMAL
BACTERIA UR CULT: ABNORMAL

## 2022-02-10 ENCOUNTER — ULTRASOUND (OUTPATIENT)
Dept: OBGYN CLINIC | Facility: CLINIC | Age: 27
End: 2022-02-10
Payer: COMMERCIAL

## 2022-02-10 DIAGNOSIS — O20.0 THREATENED MISCARRIAGE IN EARLY PREGNANCY: Primary | ICD-10-CM

## 2022-02-10 PROCEDURE — 99213 OFFICE O/P EST LOW 20 MIN: CPT | Performed by: OBSTETRICS & GYNECOLOGY

## 2022-02-14 ENCOUNTER — APPOINTMENT (OUTPATIENT)
Dept: LAB | Age: 27
End: 2022-02-14
Payer: COMMERCIAL

## 2022-02-14 ENCOUNTER — TELEPHONE (OUTPATIENT)
Dept: OBGYN CLINIC | Facility: CLINIC | Age: 27
End: 2022-02-14

## 2022-02-14 DIAGNOSIS — O20.0 THREATENED MISCARRIAGE IN EARLY PREGNANCY: ICD-10-CM

## 2022-02-14 DIAGNOSIS — N30.01 ACUTE CYSTITIS WITH HEMATURIA: ICD-10-CM

## 2022-02-14 DIAGNOSIS — N30.01 ACUTE CYSTITIS WITH HEMATURIA: Primary | ICD-10-CM

## 2022-02-14 LAB
B-HCG SERPL-ACNC: 12 MIU/ML
BACTERIA UR QL AUTO: ABNORMAL /HPF
BILIRUB UR QL STRIP: NEGATIVE
CLARITY UR: CLEAR
COLOR UR: YELLOW
GLUCOSE UR STRIP-MCNC: NEGATIVE MG/DL
HGB UR QL STRIP.AUTO: ABNORMAL
KETONES UR STRIP-MCNC: NEGATIVE MG/DL
LEUKOCYTE ESTERASE UR QL STRIP: ABNORMAL
NITRITE UR QL STRIP: NEGATIVE
NON-SQ EPI CELLS URNS QL MICRO: ABNORMAL /HPF
PH UR STRIP.AUTO: 7.5 [PH]
PROT UR STRIP-MCNC: NEGATIVE MG/DL
RBC #/AREA URNS AUTO: ABNORMAL /HPF
SP GR UR STRIP.AUTO: 1.02 (ref 1–1.03)
UROBILINOGEN UR QL STRIP.AUTO: 0.2 E.U./DL
WBC #/AREA URNS AUTO: ABNORMAL /HPF

## 2022-02-14 PROCEDURE — 87147 CULTURE TYPE IMMUNOLOGIC: CPT

## 2022-02-14 PROCEDURE — 36415 COLL VENOUS BLD VENIPUNCTURE: CPT

## 2022-02-14 PROCEDURE — 87086 URINE CULTURE/COLONY COUNT: CPT

## 2022-02-14 PROCEDURE — 84702 CHORIONIC GONADOTROPIN TEST: CPT

## 2022-02-14 PROCEDURE — 81001 URINALYSIS AUTO W/SCOPE: CPT

## 2022-02-14 RX ORDER — SULFAMETHOXAZOLE AND TRIMETHOPRIM 800; 160 MG/1; MG/1
1 TABLET ORAL EVERY 12 HOURS SCHEDULED
Qty: 6 TABLET | Refills: 0 | Status: SHIPPED | OUTPATIENT
Start: 2022-02-14 | End: 2022-02-17

## 2022-02-14 NOTE — TELEPHONE ENCOUNTER
You saw pt Fri - cannot find note  Pt having early miscarriage - no u/s  HCG 21  Pt was seen 1 wk ago also in ER  Had blood in urine and lactobacillus species showed  Pt has pressure, frequency and voiding small amts frequently  She gets many uti's and is asking for an rx  I know we do not usually treat this - but she is symptomatic  No allergies  She is going for hcg tomorrow  Another urine also and I will send her today  Macrobid x7  Not sure what to do  I am also sending chart  Ana Bowens  [birth year]    Per ED, lactobacillus not infective - a contaminant  But because of sx, sent for u/a and culture and will have hcg also today  Bactrim ds, bid x 3    To laura samano

## 2022-02-14 NOTE — TELEPHONE ENCOUNTER
Pt was here on 2/10/22 with Dr Alisa Penn  Pt had a miscarriage  Pt visit the ER before she came to see Dr Alisa Penn (2/6/22) but pt was not aware of having an UTI at that moment  After she knowledged of her UTI, Pt contact the ER provider regarding getting an UTI medication and she was told her OB provider is who needs to supply the medication  Please advise! Thanks!

## 2022-02-15 LAB
BACTERIA UR CULT: ABNORMAL
BACTERIA UR CULT: ABNORMAL

## 2022-02-16 ENCOUNTER — TELEPHONE (OUTPATIENT)
Dept: OBGYN CLINIC | Facility: CLINIC | Age: 27
End: 2022-02-16

## 2022-02-16 DIAGNOSIS — O20.0 THREATENED MISCARRIAGE IN EARLY PREGNANCY: ICD-10-CM

## 2022-02-16 NOTE — PROGRESS NOTES
Assessment/Plan:      Diagnoses and all orders for this visit:    Threatened miscarriage in early pregnancy  -     hCG, quantitative; Future  -     hCG, quantitative; Future    Other orders  -     Prenatal MV-Min-FA-Omega-3 (PRENATAL GUMMIES/DHA & FA PO); Take by mouth      Reassurance given  Follow hcg trend until negative  Call with positive pregnancy test in future to arrange for serial hcgs and 7-8 week ultrasound  Subjective:     Patient ID: John English is a 32 y o  female  Patient presents today to establish care with our office  Specifically, she is here for discussion of pregnancy and recent diagnosis of miscarriage  She was seen in the ER on 2/6/22 for heavy bleeding, cramping and a positive pregnancy test   She has a history of endometriosis so had been worried that she would have difficulty conceiving a pregnancy  She had just stopped her contraception and begun trying when she conceived this pregnancy  In the ED, her hcg was 47  It has since fallen to 21  Her bleeding and pain are improving  We reviewed that this is called a biochemical pregnancy  We reviewed how common miscarriage is, how this does not put her at increased risk for future miscarriage, why miscarriage usually occurs, how she did not cause and could not have prevented this  All questions answered  Further, encouraged her that conception on the first month of trying is very encouraging given her history of endometriosis  All questions answered         Review of Systems      Objective:     Physical Exam

## 2022-02-16 NOTE — TELEPHONE ENCOUNTER
----- Message from Judith Keys MD sent at 2/15/2022  6:51 PM EST -----  Please let the patient know her hcg continues to fall  Will repeat in one week  Her urine just shows skin/vaginal contamination, but no UTI  MRI was obtained showing increased size of recurrence along the left FOM extending to the oral tongue, parapharyngeal space,  space and lateral oropharyngeal wall with denervation of left hemiglossus. MRI also showed increased size of nodule in the left neck extending to the left parapharyngeal space associated with the left C4 nerve root/trunk. Report of MRI pending discussion between ENT/Plastic Surgery teams. MRI was obtained showing increased size of recurrence along the left FOM extending to the oral tongue, parapharyngeal space,  space and lateral oropharyngeal wall with denervation of left hemiglossus. MRI also showed increased size of nodule in the left neck extending to the left parapharyngeal space associated with the left C4 nerve root/trunk. Planned for biopsy sometime next week - based on which will determine need for chemotherapy.   Management per ENT/Oncology teams. Advanced Care Planning Time: 15 minutes face to face. Patient is currently capacitated to make all informed health decisions for himself- today appointed his son Evan Ventura as his primary HCP and his brother Diony Ventura as his secondary HCP. Health care proxy documentation completed and placed in the chart. At this time- patients goals are to have his symptoms well controlled, with plan for biopsy to determine need for chemotherapy.

## 2022-02-16 NOTE — TELEPHONE ENCOUNTER
I called and spoke to pt relaying below  Informed pt to repeat quants in a week- I put order in for pt to go 2/21  She verbalized understanding to all

## 2022-02-22 ENCOUNTER — APPOINTMENT (OUTPATIENT)
Dept: LAB | Age: 27
End: 2022-02-22
Payer: COMMERCIAL

## 2022-02-22 DIAGNOSIS — O20.0 THREATENED MISCARRIAGE IN EARLY PREGNANCY: ICD-10-CM

## 2022-02-22 LAB — B-HCG SERPL-ACNC: 16 MIU/ML

## 2022-02-22 PROCEDURE — 84702 CHORIONIC GONADOTROPIN TEST: CPT

## 2022-02-22 PROCEDURE — 36415 COLL VENOUS BLD VENIPUNCTURE: CPT

## 2022-02-22 NOTE — TELEPHONE ENCOUNTER
Pt following up - hcg was 12 last week on Monday 2/14 and are 16 today and she wants to know if this is normal or concerning,    Please advise

## 2022-02-23 NOTE — TELEPHONE ENCOUNTER
Patient is anxious about hcg results and possbility of retained products  States bleeding subsided approximately  1 week ago  Denies pain  Has been sexually active  Always uses condoms   Will route to Dr Taryn Fernandez to advise

## 2022-02-24 NOTE — TELEPHONE ENCOUNTER
I did speak with the patient this morning regarding this slight increase in her quant HCG   All questions were answered and she will complete her next quant 1 week from last

## 2022-03-01 ENCOUNTER — APPOINTMENT (OUTPATIENT)
Dept: LAB | Age: 27
End: 2022-03-01
Payer: COMMERCIAL

## 2022-03-01 DIAGNOSIS — O20.0 THREATENED MISCARRIAGE IN EARLY PREGNANCY: ICD-10-CM

## 2022-03-01 LAB — B-HCG SERPL-ACNC: 25 MIU/ML

## 2022-03-01 PROCEDURE — 84702 CHORIONIC GONADOTROPIN TEST: CPT

## 2022-03-01 PROCEDURE — 36415 COLL VENOUS BLD VENIPUNCTURE: CPT

## 2022-03-02 ENCOUNTER — TELEPHONE (OUTPATIENT)
Dept: OBGYN CLINIC | Facility: CLINIC | Age: 27
End: 2022-03-02

## 2022-03-02 DIAGNOSIS — O02.1 MISSED AB: Primary | ICD-10-CM

## 2022-03-02 DIAGNOSIS — O20.0 THREATENED MISCARRIAGE IN EARLY PREGNANCY: Primary | ICD-10-CM

## 2022-03-02 DIAGNOSIS — Z34.90 PREGNANCY, UNSPECIFIED GESTATIONAL AGE: ICD-10-CM

## 2022-03-02 NOTE — TELEPHONE ENCOUNTER
Pt called back and wanted to double check she still needed to go for HCG tomorrow as well  Told her yes

## 2022-03-02 NOTE — TELEPHONE ENCOUNTER
Per comm consent lm to pt and explained plan  I will place US order, call and get that set up  After she gets appt date for US call and set up here about 5 days after so we have results and can discuss next step

## 2022-03-02 NOTE — TELEPHONE ENCOUNTER
Pt called to say she saw her hcg went up again, and she said when she was in the ED on 2/6 she has a miscarriage, she is still having pelvic pressure and diarrhea ,is concerned , per on call, will go for another quant tomorrow, , stay hydrated, if pelvic pressure gets worse to ed

## 2022-03-02 NOTE — TELEPHONE ENCOUNTER
----- Message from Estela Carr MD sent at 3/2/2022  3:06 PM EST -----  Her hcg emilee again  Please let her know that we need to try to figure out where the pregnancy tissue is  Please send her for an ultrasound and then have her come to the office to discuss a pregnancy of unknown location with a doctor  May need endometrial biopsy and/or methotrexate

## 2022-03-07 ENCOUNTER — HOSPITAL ENCOUNTER (OUTPATIENT)
Dept: ULTRASOUND IMAGING | Facility: HOSPITAL | Age: 27
Discharge: HOME | End: 2022-03-07
Attending: OBSTETRICS & GYNECOLOGY
Payer: COMMERCIAL

## 2022-03-07 DIAGNOSIS — O20.0 THREATENED MISCARRIAGE IN EARLY PREGNANCY: ICD-10-CM

## 2022-03-07 DIAGNOSIS — Z34.90 PREGNANCY, UNSPECIFIED GESTATIONAL AGE: ICD-10-CM

## 2022-03-07 PROCEDURE — 76815 OB US LIMITED FETUS(S): CPT

## 2022-03-08 ENCOUNTER — ANESTHESIA (OUTPATIENT)
Dept: PERIOP | Facility: HOSPITAL | Age: 27
End: 2022-03-08
Payer: COMMERCIAL

## 2022-03-08 ENCOUNTER — ANESTHESIA EVENT (OUTPATIENT)
Dept: PERIOP | Facility: HOSPITAL | Age: 27
End: 2022-03-08
Payer: COMMERCIAL

## 2022-03-08 ENCOUNTER — TELEPHONE (OUTPATIENT)
Dept: OBGYN CLINIC | Facility: CLINIC | Age: 27
End: 2022-03-08

## 2022-03-08 ENCOUNTER — HOSPITAL ENCOUNTER (OUTPATIENT)
Facility: HOSPITAL | Age: 27
Setting detail: OUTPATIENT SURGERY
Discharge: HOME | End: 2022-03-09
Attending: OBSTETRICS & GYNECOLOGY | Admitting: OBSTETRICS & GYNECOLOGY
Payer: COMMERCIAL

## 2022-03-08 DIAGNOSIS — O00.102 LEFT TUBAL PREGNANCY, UNSPECIFIED WHETHER INTRAUTERINE PREGNANCY PRESENT: ICD-10-CM

## 2022-03-08 DIAGNOSIS — O00.90 ECTOPIC PREGNANCY WITHOUT INTRAUTERINE PREGNANCY, UNSPECIFIED LOCATION: Primary | ICD-10-CM

## 2022-03-08 DIAGNOSIS — Z34.90 PREGNANCY, UNSPECIFIED GESTATIONAL AGE: Primary | ICD-10-CM

## 2022-03-08 DIAGNOSIS — Z98.890 S/P OVARIAN CYSTECTOMY: ICD-10-CM

## 2022-03-08 DIAGNOSIS — Z98.890 S/P DILATION AND CURETTAGE: ICD-10-CM

## 2022-03-08 DIAGNOSIS — R10.2 PELVIC PAIN WITH POSITIVE BETA-HUMAN CHORIONIC GONADOTROPIN (BHCG) IN FEMALE: ICD-10-CM

## 2022-03-08 DIAGNOSIS — Z87.42 S/P OVARIAN CYSTECTOMY: ICD-10-CM

## 2022-03-08 DIAGNOSIS — Z33.1 PELVIC PAIN WITH POSITIVE BETA-HUMAN CHORIONIC GONADOTROPIN (BHCG) IN FEMALE: ICD-10-CM

## 2022-03-08 PROBLEM — N83.201 CYST OF RIGHT OVARY: Status: ACTIVE | Noted: 2022-03-08

## 2022-03-08 LAB
ABO GROUP BLD: NORMAL
ALBUMIN SERPL BCP-MCNC: 4.2 G/DL (ref 3.5–5)
ALP SERPL-CCNC: 87 U/L (ref 46–116)
ALT SERPL W P-5'-P-CCNC: 55 U/L (ref 12–78)
ANION GAP SERPL CALCULATED.3IONS-SCNC: 10 MMOL/L (ref 4–13)
AST SERPL W P-5'-P-CCNC: 28 U/L (ref 5–45)
B-HCG SERPL-ACNC: 17 MIU/ML
BILIRUB SERPL-MCNC: 0.33 MG/DL (ref 0.2–1)
BLD GP AB SCN SERPL QL: POSITIVE
BLOOD GROUP ANTIBODIES SERPL: NORMAL
BUN SERPL-MCNC: 11 MG/DL (ref 5–25)
CALCIUM SERPL-MCNC: 9.5 MG/DL (ref 8.3–10.1)
CHLORIDE SERPL-SCNC: 101 MMOL/L (ref 100–108)
CO2 SERPL-SCNC: 26 MMOL/L (ref 21–32)
CREAT SERPL-MCNC: 0.62 MG/DL (ref 0.6–1.3)
ERYTHROCYTE [DISTWIDTH] IN BLOOD BY AUTOMATED COUNT: 12.2 % (ref 11.6–15.1)
GFR SERPL CREATININE-BSD FRML MDRD: 124 ML/MIN/1.73SQ M
GLUCOSE SERPL-MCNC: 83 MG/DL (ref 65–140)
HCT VFR BLD AUTO: 41.4 % (ref 34.8–46.1)
HGB BLD-MCNC: 14.7 G/DL (ref 11.5–15.4)
MCH RBC QN AUTO: 30.1 PG (ref 26.8–34.3)
MCHC RBC AUTO-ENTMCNC: 35.5 G/DL (ref 31.4–37.4)
MCV RBC AUTO: 85 FL (ref 82–98)
PLATELET # BLD AUTO: 368 THOUSANDS/UL (ref 149–390)
PMV BLD AUTO: 8.8 FL (ref 8.9–12.7)
POTASSIUM SERPL-SCNC: 3.8 MMOL/L (ref 3.5–5.3)
PROT SERPL-MCNC: 8.3 G/DL (ref 6.4–8.2)
RBC # BLD AUTO: 4.89 MILLION/UL (ref 3.81–5.12)
RH BLD: NEGATIVE
SODIUM SERPL-SCNC: 137 MMOL/L (ref 136–145)
SPECIMEN EXPIRATION DATE: NORMAL
WBC # BLD AUTO: 11.59 THOUSAND/UL (ref 4.31–10.16)

## 2022-03-08 PROCEDURE — 86850 RBC ANTIBODY SCREEN: CPT | Performed by: OBSTETRICS & GYNECOLOGY

## 2022-03-08 PROCEDURE — 84702 CHORIONIC GONADOTROPIN TEST: CPT | Performed by: OBSTETRICS & GYNECOLOGY

## 2022-03-08 PROCEDURE — 86901 BLOOD TYPING SEROLOGIC RH(D): CPT | Performed by: OBSTETRICS & GYNECOLOGY

## 2022-03-08 PROCEDURE — 88305 TISSUE EXAM BY PATHOLOGIST: CPT | Performed by: PATHOLOGY

## 2022-03-08 PROCEDURE — 36415 COLL VENOUS BLD VENIPUNCTURE: CPT | Performed by: OBSTETRICS & GYNECOLOGY

## 2022-03-08 PROCEDURE — 86870 RBC ANTIBODY IDENTIFICATION: CPT | Performed by: OBSTETRICS & GYNECOLOGY

## 2022-03-08 PROCEDURE — 86900 BLOOD TYPING SEROLOGIC ABO: CPT | Performed by: OBSTETRICS & GYNECOLOGY

## 2022-03-08 PROCEDURE — 58120 DILATION AND CURETTAGE: CPT | Performed by: STUDENT IN AN ORGANIZED HEALTH CARE EDUCATION/TRAINING PROGRAM

## 2022-03-08 PROCEDURE — 58662 LAPAROSCOPY EXCISE LESIONS: CPT | Performed by: STUDENT IN AN ORGANIZED HEALTH CARE EDUCATION/TRAINING PROGRAM

## 2022-03-08 PROCEDURE — 85027 COMPLETE CBC AUTOMATED: CPT | Performed by: OBSTETRICS & GYNECOLOGY

## 2022-03-08 PROCEDURE — 80053 COMPREHEN METABOLIC PANEL: CPT | Performed by: OBSTETRICS & GYNECOLOGY

## 2022-03-08 PROCEDURE — 99285 EMERGENCY DEPT VISIT HI MDM: CPT

## 2022-03-08 PROCEDURE — 99225 PR SBSQ OBSERVATION CARE/DAY 25 MINUTES: CPT | Performed by: STUDENT IN AN ORGANIZED HEALTH CARE EDUCATION/TRAINING PROGRAM

## 2022-03-08 RX ORDER — IBUPROFEN 600 MG/1
600 TABLET ORAL EVERY 6 HOURS PRN
Status: DISCONTINUED | OUTPATIENT
Start: 2022-03-08 | End: 2022-03-09

## 2022-03-08 RX ORDER — GLYCOPYRROLATE 0.2 MG/ML
INJECTION INTRAMUSCULAR; INTRAVENOUS AS NEEDED
Status: DISCONTINUED | OUTPATIENT
Start: 2022-03-08 | End: 2022-03-08

## 2022-03-08 RX ORDER — METOCLOPRAMIDE HYDROCHLORIDE 5 MG/ML
10 INJECTION INTRAMUSCULAR; INTRAVENOUS ONCE AS NEEDED
Status: DISCONTINUED | OUTPATIENT
Start: 2022-03-08 | End: 2022-03-09 | Stop reason: HOSPADM

## 2022-03-08 RX ORDER — ONDANSETRON 2 MG/ML
4 INJECTION INTRAMUSCULAR; INTRAVENOUS EVERY 6 HOURS PRN
Status: DISCONTINUED | OUTPATIENT
Start: 2022-03-08 | End: 2022-03-09 | Stop reason: HOSPADM

## 2022-03-08 RX ORDER — NEOSTIGMINE METHYLSULFATE 1 MG/ML
INJECTION INTRAVENOUS AS NEEDED
Status: DISCONTINUED | OUTPATIENT
Start: 2022-03-08 | End: 2022-03-08

## 2022-03-08 RX ORDER — ROCURONIUM BROMIDE 10 MG/ML
INJECTION, SOLUTION INTRAVENOUS AS NEEDED
Status: DISCONTINUED | OUTPATIENT
Start: 2022-03-08 | End: 2022-03-08

## 2022-03-08 RX ORDER — MAGNESIUM HYDROXIDE 1200 MG/15ML
LIQUID ORAL AS NEEDED
Status: DISCONTINUED | OUTPATIENT
Start: 2022-03-08 | End: 2022-03-08 | Stop reason: HOSPADM

## 2022-03-08 RX ORDER — ACETAMINOPHEN 325 MG/1
975 TABLET ORAL EVERY 6 HOURS PRN
Status: DISCONTINUED | OUTPATIENT
Start: 2022-03-08 | End: 2022-03-09

## 2022-03-08 RX ORDER — SODIUM CHLORIDE, SODIUM LACTATE, POTASSIUM CHLORIDE, CALCIUM CHLORIDE 600; 310; 30; 20 MG/100ML; MG/100ML; MG/100ML; MG/100ML
125 INJECTION, SOLUTION INTRAVENOUS CONTINUOUS
Status: DISCONTINUED | OUTPATIENT
Start: 2022-03-08 | End: 2022-03-09 | Stop reason: HOSPADM

## 2022-03-08 RX ORDER — DEXAMETHASONE SODIUM PHOSPHATE 10 MG/ML
INJECTION, SOLUTION INTRAMUSCULAR; INTRAVENOUS AS NEEDED
Status: DISCONTINUED | OUTPATIENT
Start: 2022-03-08 | End: 2022-03-08

## 2022-03-08 RX ORDER — OXYCODONE HYDROCHLORIDE 5 MG/1
5 TABLET ORAL EVERY 4 HOURS PRN
Status: DISCONTINUED | OUTPATIENT
Start: 2022-03-08 | End: 2022-03-09 | Stop reason: HOSPADM

## 2022-03-08 RX ORDER — SUCCINYLCHOLINE/SOD CL,ISO/PF 100 MG/5ML
SYRINGE (ML) INTRAVENOUS AS NEEDED
Status: DISCONTINUED | OUTPATIENT
Start: 2022-03-08 | End: 2022-03-08

## 2022-03-08 RX ORDER — BUPIVACAINE HYDROCHLORIDE 5 MG/ML
INJECTION, SOLUTION PERINEURAL AS NEEDED
Status: DISCONTINUED | OUTPATIENT
Start: 2022-03-08 | End: 2022-03-08 | Stop reason: HOSPADM

## 2022-03-08 RX ORDER — ONDANSETRON 2 MG/ML
4 INJECTION INTRAMUSCULAR; INTRAVENOUS ONCE AS NEEDED
Status: DISCONTINUED | OUTPATIENT
Start: 2022-03-08 | End: 2022-03-09 | Stop reason: HOSPADM

## 2022-03-08 RX ORDER — PROPOFOL 10 MG/ML
INJECTION, EMULSION INTRAVENOUS AS NEEDED
Status: DISCONTINUED | OUTPATIENT
Start: 2022-03-08 | End: 2022-03-08

## 2022-03-08 RX ORDER — ALBUMIN, HUMAN INJ 5% 5 %
SOLUTION INTRAVENOUS CONTINUOUS PRN
Status: DISCONTINUED | OUTPATIENT
Start: 2022-03-08 | End: 2022-03-08

## 2022-03-08 RX ORDER — ONDANSETRON 2 MG/ML
INJECTION INTRAMUSCULAR; INTRAVENOUS AS NEEDED
Status: DISCONTINUED | OUTPATIENT
Start: 2022-03-08 | End: 2022-03-08

## 2022-03-08 RX ORDER — FENTANYL CITRATE 50 UG/ML
INJECTION, SOLUTION INTRAMUSCULAR; INTRAVENOUS AS NEEDED
Status: DISCONTINUED | OUTPATIENT
Start: 2022-03-08 | End: 2022-03-08

## 2022-03-08 RX ORDER — MIDAZOLAM HYDROCHLORIDE 2 MG/2ML
INJECTION, SOLUTION INTRAMUSCULAR; INTRAVENOUS AS NEEDED
Status: DISCONTINUED | OUTPATIENT
Start: 2022-03-08 | End: 2022-03-08

## 2022-03-08 RX ORDER — LIDOCAINE HYDROCHLORIDE 10 MG/ML
INJECTION, SOLUTION EPIDURAL; INFILTRATION; INTRACAUDAL; PERINEURAL AS NEEDED
Status: DISCONTINUED | OUTPATIENT
Start: 2022-03-08 | End: 2022-03-08

## 2022-03-08 RX ORDER — HYDROMORPHONE HCL/PF 1 MG/ML
0.5 SYRINGE (ML) INJECTION
Status: DISCONTINUED | OUTPATIENT
Start: 2022-03-08 | End: 2022-03-09 | Stop reason: HOSPADM

## 2022-03-08 RX ADMIN — NEOSTIGMINE METHYLSULFATE 3 MG: 1 INJECTION INTRAVENOUS at 23:04

## 2022-03-08 RX ADMIN — LIDOCAINE HYDROCHLORIDE 100 MG: 10 INJECTION, SOLUTION EPIDURAL; INFILTRATION; INTRACAUDAL at 22:02

## 2022-03-08 RX ADMIN — GLYCOPYRROLATE 0.4 MG: 0.2 INJECTION, SOLUTION INTRAMUSCULAR; INTRAVENOUS at 23:04

## 2022-03-08 RX ADMIN — ROCURONIUM BROMIDE 40 MG: 10 SOLUTION INTRAVENOUS at 22:06

## 2022-03-08 RX ADMIN — MIDAZOLAM HYDROCHLORIDE 2 MG: 1 INJECTION, SOLUTION INTRAMUSCULAR; INTRAVENOUS at 21:56

## 2022-03-08 RX ADMIN — DEXAMETHASONE SODIUM PHOSPHATE 10 MG: 10 INJECTION, SOLUTION INTRAMUSCULAR; INTRAVENOUS at 22:08

## 2022-03-08 RX ADMIN — FENTANYL CITRATE 75 MCG: 50 INJECTION, SOLUTION INTRAMUSCULAR; INTRAVENOUS at 22:08

## 2022-03-08 RX ADMIN — HYDROMORPHONE HYDROCHLORIDE 0.5 MG: 1 INJECTION, SOLUTION INTRAMUSCULAR; INTRAVENOUS; SUBCUTANEOUS at 23:42

## 2022-03-08 RX ADMIN — ROCURONIUM BROMIDE 10 MG: 10 SOLUTION INTRAVENOUS at 22:02

## 2022-03-08 RX ADMIN — ONDANSETRON 4 MG: 2 INJECTION INTRAMUSCULAR; INTRAVENOUS at 22:08

## 2022-03-08 RX ADMIN — PROPOFOL 200 MG: 10 INJECTION, EMULSION INTRAVENOUS at 22:02

## 2022-03-08 RX ADMIN — FENTANYL CITRATE 50 MCG: 50 INJECTION, SOLUTION INTRAMUSCULAR; INTRAVENOUS at 22:30

## 2022-03-08 RX ADMIN — FENTANYL CITRATE 50 MCG: 50 INJECTION, SOLUTION INTRAMUSCULAR; INTRAVENOUS at 23:05

## 2022-03-08 RX ADMIN — Medication 100 MG: at 22:02

## 2022-03-08 RX ADMIN — PROPOFOL 50 MG: 10 INJECTION, EMULSION INTRAVENOUS at 23:05

## 2022-03-08 RX ADMIN — ALBUMIN HUMAN: 0.05 INJECTION, SOLUTION INTRAVENOUS at 22:14

## 2022-03-08 RX ADMIN — SODIUM CHLORIDE, SODIUM LACTATE, POTASSIUM CHLORIDE, AND CALCIUM CHLORIDE 125 ML/HR: .6; .31; .03; .02 INJECTION, SOLUTION INTRAVENOUS at 17:20

## 2022-03-08 RX ADMIN — SODIUM CHLORIDE, SODIUM LACTATE, POTASSIUM CHLORIDE, AND CALCIUM CHLORIDE: .6; .31; .03; .02 INJECTION, SOLUTION INTRAVENOUS at 23:00

## 2022-03-08 RX ADMIN — FENTANYL CITRATE 25 MCG: 50 INJECTION, SOLUTION INTRAMUSCULAR; INTRAVENOUS at 22:02

## 2022-03-08 RX ADMIN — HYDROMORPHONE HYDROCHLORIDE 0.5 MG: 1 INJECTION, SOLUTION INTRAMUSCULAR; INTRAVENOUS; SUBCUTANEOUS at 23:36

## 2022-03-08 RX ADMIN — HYDROMORPHONE HYDROCHLORIDE 0.5 MG: 1 INJECTION, SOLUTION INTRAMUSCULAR; INTRAVENOUS; SUBCUTANEOUS at 23:31

## 2022-03-08 NOTE — ANESTHESIA PREPROCEDURE EVALUATION
Procedure:  LAPAROSCOPY DIAGNOSTIC (N/A Abdomen)  SALPINGECTOMY, LAPAROSCOPIC (Right Abdomen)  OOPHORECTOMY, LAPAROSCOPIC (Right Abdomen)  DILATATION AND CURETTAGE (D&C) (N/A Uterus)    Relevant Problems   PULMONARY   (+) Asthma     POTS (postural orthostatic tachycardia syndrome)  Asthma          Physical Exam    Airway    Mallampati score: II  TM Distance: >3 FB  Neck ROM: full     Dental       Cardiovascular      Pulmonary      Other Findings        Anesthesia Plan  ASA Score- 2 Emergent    Anesthesia Type- general with ASA Monitors  Additional Monitors:   Airway Plan: ETT  Plan Factors-    Chart reviewed  Induction- intravenous  Postoperative Plan-     Informed Consent- Anesthetic plan and risks discussed with patient  I personally reviewed this patient with the CRNA  Discussed and agreed on the Anesthesia Plan with the CRNA  Jesenia Patino

## 2022-03-08 NOTE — LETTER
[address] [address]  Dept: [phone]    March 9, 2022     Patient: Wyatt Silverio   YOB: 1995   Date of Visit: 3/8/2022       To Whom it May Concern:    Wyatt Silverio is under my professional care  She was seen in the hospital from 3/8/2022   to 03/09/22  She may return to work on 3/16/22 with the following limitations light duty, no lifting more than 15lbs, frequent rests  If you have any questions or concerns, please don't hesitate to call           Sincerely,          Alyssa Krishna MD

## 2022-03-08 NOTE — TELEPHONE ENCOUNTER
Per  pt called back and will check with her supervisor to see which of those appts she can make either appt tomorrow

## 2022-03-08 NOTE — H&P
H&P Exam - Gynecology   Desmond Thomson 32 y o  female MRN: [id number]  Unit/Bed#: ED 16 Encounter: [id number]      Assessment/Plan     A/P: 31yo  with persistently elevated beta hCG, vaginal bleeding, and ultrasound findings concerning for hemorrhagic right adnexal ectopic pregnancy  Discussed possible etiologies of persistently elevated beta hCG as well as etiologies of new adnexal mass, including ectopic pregnancy, miscarriage, endometrioma, hemorrhagic cyst, or other failed IUP  Discussed that at this time patient does not require emergent intervention as she is hemodynamically stable without evidence of rupture or internal bleeding  Discussed potential treatments, including observation and expectant management, medical management with methotrexate, or surgical management  Discussed risks and benefits of each  Patient expressed frustration and mental fatigue with ongoing evaluation and need for continued monitoring  She expressed fear for future fertility  She expressed discomfort with ongoing symptoms of bleeding and "pinching" in her ovaries  She expressed that she is concerned methotrexate administration would still result in need for procedural management and is afraid of having ruptured ectopic pregnancy  She would like to proceed with surgical management  Discussed risks, benefits, and alternatives of surgery  Consented for diagnostic laparoscopy, possible removal of ectopic pregnancy, possible salpingectomy, possible oophorectomy, possible cystectomy, dilation and curettage, and all other indicated procedures  Discussed risks of injury to bowel, bladder, ureters, or vasculature  Discussed risks of uterine perforation  Discussed potential impacts on future fertility  Discussed potential for laparotomy  All questions answered, patient desires to proceed      - NPO  - CBC, T&S, beta hCG  - Admit to observation  - Case request placed  - Will proceed to OR when able  - Serial abdominal exams    Code Status: Level 1 - Full Code    Discussed with Dr Juan Pickens    History of Present Illness     HPI:  Librado Rosas is a 32 y o    who presents with pregnancy of unknown location with persistent and rising beta hCG, ongoing vaginal bleeding, and new 4cm adnexal mass  She had a hormonal IUD removed 22, and was attempting to conceive  She presented to the ED 22 with pelvic cramping and vaginal bleeding  Ultrasound showed "No suspicious adnexal mass or loculated collections  There is small amount of simple free fluid in the pelvis, likely physiologic in this premenopausal female " Her beta hCG at the time was 52, and she was counseled on differential diagnosis of miscarriage vs early IUD  She received Rhogam at the time  Her beta hCG subsequently decreased to 21 on 22 and then to 12 on 22, suggesting biochemical pregnancy or miscarriage  Her bleeding began to subside and she continued to trend beta hCG levels  On 22, it increased again to 16, and subsequently to 25 on 3/1/22  Her vaginal bleeding increased as well, to the level of a lighter period She was ordered for outpatient ultrasound, which showed " Right adnexal solid cystic area measuring 3 9 x 3 9 x 4 4 cm and is new from the study performed last month " She endorses some "pinching" pain sensation, more persistent on the right than the left, and some low back pain  She reports these pains are less than what she usually experiences with endometriosis pains  Her bleeding is also less than her normal period, without any significant clots, and no bright red bleeding  It fluctuates from red to dark black colored  Given symptoms and new finding, she presented for evaluation    She has also been struggling with diarrhea since this episode started, with multiple bouts of loose stool per day  She has not had a normal formed bowel movement for the past month       She has a known history of endometriosis, and underwent diagnostic laparoscopy and fulguration of endometriosis in 2021  Review of Systems   Constitutional: Negative for chills, fatigue and fever  Eyes: Negative for visual disturbance  Respiratory: Negative for cough, chest tightness and shortness of breath  Cardiovascular: Negative for chest pain and palpitations  Gastrointestinal: Positive for abdominal pain ("pinches" in the area of her ovary, less than endometriosis pain) and diarrhea  Negative for abdominal distention, constipation, nausea and vomiting  Genitourinary: Positive for vaginal bleeding (amount of a normal period, for weeks)  Negative for dysuria, frequency, urgency and vaginal discharge  Musculoskeletal: Positive for back pain (lower)  Skin: Negative for pallor, rash and wound  Neurological: Negative for dizziness, syncope, weakness, light-headedness and headaches  Hematological: Does not bruise/bleed easily         Historical Information   Past Medical History:   Diagnosis Date    Allergic rhinitis     Asthma     Endometriosis     POTS (postural orthostatic tachycardia syndrome)      Past Surgical History:   Procedure Laterality Date    ENDOMETRIAL ABLATION      LAPAROSCOPIC ENDOMETRIOSIS FULGURATION  2021     OB History    Para Term  AB Living   1             SAB IAB Ectopic Multiple Live Births                  # Outcome Date GA Lbr Garcia/2nd Weight Sex Delivery Anes PTL Lv   1 Current                  Family History   Family history unknown: Yes     Social History   Social History     Substance and Sexual Activity   Alcohol Use Yes     Social History     Substance and Sexual Activity   Drug Use Never     Social History     Tobacco Use   Smoking Status Never Smoker   Smokeless Tobacco Never Used       Meds/Allergies   (Not in a hospital admission)    No Known Allergies    Objective   /78 (BP Location: Left arm)   Pulse 79   Temp 98 1 °F (36 7 °C) (Oral)   Resp 16   LMP 2022   SpO2 100%     No intake or output data in the 24 hours ending 03/08/22 1628      Physical Exam  Constitutional:       General: She is not in acute distress  Appearance: She is not ill-appearing or toxic-appearing  HENT:      Head: Normocephalic and atraumatic  Cardiovascular:      Rate and Rhythm: Normal rate  Pulses: Normal pulses  Pulmonary:      Effort: Pulmonary effort is normal  No respiratory distress  Abdominal:      General: There is no distension  Palpations: Abdomen is soft  Tenderness: There is abdominal tenderness (mild, lower abdomen)  There is no guarding or rebound  Comments: Able to sit up and lay back with ease   Neurological:      Mental Status: She is alert  Psychiatric:         Mood and Affect: Mood normal          Behavior: Behavior normal          Lab Results:   Admission on 03/08/2022   Component Date Value    WBC 03/08/2022 11 59*    RBC 03/08/2022 4 89     Hemoglobin 03/08/2022 14 7     Hematocrit 03/08/2022 41 4     MCV 03/08/2022 85     MCH 03/08/2022 30 1     MCHC 03/08/2022 35 5     RDW 03/08/2022 12 2     Platelets 40/95/9708 368     MPV 03/08/2022 8 8*     TVUS 3/7/22:     UTERUS:  The uterus is anteverted in position, measuring 7 8 x 4 1 x 6 1 cm  The uterus has a normal contour and echotexture  The cervix appears within normal limits      ENDOMETRIUM:    Endometrial thickness at 6 mm      OVARIES/ADNEXA:  Right ovary:  4 5 x 4 8 x 4 4 cm  50 4 mL  No suspicious right ovarian abnormality  Complex solid cystic area measuring 3 9 x 3 9 x 4 4 cm with no internal vascular flow is new  Doppler flow within normal limits      Left ovary:  3 6 x 1 6 x 1 9 cm  5 8 mL  No suspicious left ovarian abnormality  Doppler flow within normal limits      No suspicious adnexal mass or loculated collections  There is trace free fluid      IMPRESSION:  Trace pelvic fluid     Right adnexal solid cystic area measuring 3 9 x 3 9 x 4 4 cm and is new from the study performed last month      Given the elevated beta-hCG levels this could represent hemorrhagic ectopic  No cystic foci to suggest ectopic molar  Follow-up OB        Devante Crabtree MD  3/8/2022  4:28 PM

## 2022-03-08 NOTE — TELEPHONE ENCOUNTER
Per CT " Working diagnosis at this time is ectopic pregnancy  Her quant is abnormally rising, and she now has something seen near her ovary that is new and not previously there which can support this diagnosis  I do not see any concern for molar pregnancy  ED's recommendation for ASAP OV is correct - will need treatment at this time   If her pain is anything more than mild needs to present to the ER, otherwise she could consider medical treatment, but should be seen today if at all possible  "

## 2022-03-08 NOTE — TELEPHONE ENCOUNTER
Incoming call from pt as I was leaving vm  Very frustrated that she is getting calls from multiple staff in re: to this  She said she has known something was wrong and was told Dr reyes "baffled" which was not reassuring at all  She works in the [place] [id number] at [employer] and does not want to wait anymore to be seen  She said shes been having period type bleeding and left back pain and a "pinching feeling"     We offered appts tomorrow but she is scheduled for 3/10  Told her I will TT oncall for her and get right back to her  stretcher

## 2022-03-08 NOTE — TELEPHONE ENCOUNTER
Per comm consent lm to pt to come to office ASAP for discussion on possible surgery  appt found for her at either 9:40 or 1:00 tomorrow with [facility] in [facility] office  Poss ectopic/molar, HCG rising  If pt cannot make appt advised to [facility] ER with ANY significant pain  Asked her to call and let us know if she can make that appt  * update- lm to pt with new dates and times  Not today but tomorrow are the openings per Candy Stokes

## 2022-03-08 NOTE — TELEPHONE ENCOUNTER
----- Message from Denae Maravilla MD sent at 3/8/2022  9:33 AM EST -----  She has a rising hcg and now a new finding on ultrasound  She needs to be seen by a doctor ASAP for counseling about possible surgery  (I am leaving [facility] at noon, so it should be with a doc who will be here and can do the surgery if she opts for that )  If she is having any significant pain, she should go to the ED  Otherwise, she needs an office visit to discuss methotrexate vs surgery

## 2022-03-08 NOTE — TELEPHONE ENCOUNTER
Spoke to pt and oncall, CT "She can come as private exam to ER  Gyn team will see her  Based on exam we can either start medical treatment here or take her for surgery today  I would ask her not to eat or drink if coming to ER  "    Spoke to pt and she will goto ER, pain staying at 5/10 and spikes to a 7/10 at times  Advised to stay NPO form now  ADT entered

## 2022-03-09 ENCOUNTER — TELEPHONE (OUTPATIENT)
Dept: OBGYN CLINIC | Facility: CLINIC | Age: 27
End: 2022-03-09

## 2022-03-09 VITALS
TEMPERATURE: 99.1 F | OXYGEN SATURATION: 96 % | SYSTOLIC BLOOD PRESSURE: 123 MMHG | DIASTOLIC BLOOD PRESSURE: 64 MMHG | RESPIRATION RATE: 14 BRPM | HEART RATE: 67 BPM

## 2022-03-09 DIAGNOSIS — O02.1 MISSED AB: Primary | ICD-10-CM

## 2022-03-09 LAB
ERYTHROCYTE [DISTWIDTH] IN BLOOD BY AUTOMATED COUNT: 12.1 % (ref 11.6–15.1)
HCT VFR BLD AUTO: 39.5 % (ref 34.8–46.1)
HGB BLD-MCNC: 13.6 G/DL (ref 11.5–15.4)
MCH RBC QN AUTO: 30.1 PG (ref 26.8–34.3)
MCHC RBC AUTO-ENTMCNC: 34.4 G/DL (ref 31.4–37.4)
MCV RBC AUTO: 87 FL (ref 82–98)
PLATELET # BLD AUTO: 323 THOUSANDS/UL (ref 149–390)
PMV BLD AUTO: 8.8 FL (ref 8.9–12.7)
RBC # BLD AUTO: 4.52 MILLION/UL (ref 3.81–5.12)
WBC # BLD AUTO: 15.3 THOUSAND/UL (ref 4.31–10.16)

## 2022-03-09 PROCEDURE — 85027 COMPLETE CBC AUTOMATED: CPT | Performed by: OBSTETRICS & GYNECOLOGY

## 2022-03-09 PROCEDURE — 99024 POSTOP FOLLOW-UP VISIT: CPT | Performed by: STUDENT IN AN ORGANIZED HEALTH CARE EDUCATION/TRAINING PROGRAM

## 2022-03-09 RX ORDER — OXYCODONE HYDROCHLORIDE 5 MG/1
5 TABLET ORAL EVERY 4 HOURS PRN
Qty: 7 TABLET | Refills: 0 | Status: SHIPPED | OUTPATIENT
Start: 2022-03-09

## 2022-03-09 RX ORDER — IBUPROFEN 600 MG/1
600 TABLET ORAL EVERY 6 HOURS SCHEDULED
Status: DISCONTINUED | OUTPATIENT
Start: 2022-03-09 | End: 2022-03-09 | Stop reason: HOSPADM

## 2022-03-09 RX ORDER — ACETAMINOPHEN 325 MG/1
975 TABLET ORAL EVERY 6 HOURS SCHEDULED
Refills: 0
Start: 2022-03-09

## 2022-03-09 RX ORDER — OXYCODONE HYDROCHLORIDE 5 MG/1
5 TABLET ORAL EVERY 4 HOURS PRN
Qty: 7 TABLET | Refills: 0 | Status: SHIPPED | OUTPATIENT
Start: 2022-03-09 | End: 2022-03-09

## 2022-03-09 RX ORDER — ACETAMINOPHEN 325 MG/1
975 TABLET ORAL EVERY 6 HOURS PRN
Refills: 0 | Status: CANCELLED
Start: 2022-03-09

## 2022-03-09 RX ORDER — KETOROLAC TROMETHAMINE 30 MG/ML
15 INJECTION, SOLUTION INTRAMUSCULAR; INTRAVENOUS ONCE
Status: COMPLETED | OUTPATIENT
Start: 2022-03-09 | End: 2022-03-09

## 2022-03-09 RX ORDER — ACETAMINOPHEN 325 MG/1
975 TABLET ORAL EVERY 6 HOURS SCHEDULED
Status: DISCONTINUED | OUTPATIENT
Start: 2022-03-09 | End: 2022-03-09 | Stop reason: HOSPADM

## 2022-03-09 RX ORDER — IBUPROFEN 600 MG/1
600 TABLET ORAL EVERY 6 HOURS PRN
Qty: 30 TABLET | Refills: 0
Start: 2022-03-09

## 2022-03-09 RX ADMIN — IBUPROFEN 600 MG: 600 TABLET ORAL at 12:39

## 2022-03-09 RX ADMIN — SODIUM CHLORIDE, SODIUM LACTATE, POTASSIUM CHLORIDE, AND CALCIUM CHLORIDE 125 ML/HR: .6; .31; .03; .02 INJECTION, SOLUTION INTRAVENOUS at 00:31

## 2022-03-09 RX ADMIN — IBUPROFEN 600 MG: 600 TABLET ORAL at 00:28

## 2022-03-09 RX ADMIN — KETOROLAC TROMETHAMINE 15 MG: 30 INJECTION, SOLUTION INTRAMUSCULAR at 08:45

## 2022-03-09 RX ADMIN — ACETAMINOPHEN 975 MG: 325 TABLET, FILM COATED ORAL at 12:41

## 2022-03-09 RX ADMIN — ACETAMINOPHEN 975 MG: 325 TABLET, FILM COATED ORAL at 06:40

## 2022-03-09 RX ADMIN — OXYCODONE HYDROCHLORIDE 5 MG: 5 TABLET ORAL at 06:40

## 2022-03-09 NOTE — TELEPHONE ENCOUNTER
----- Message from Ajay Alexander MD sent at 3/8/2022 11:20 PM EST -----  Regarding: post op  Please reach out to Damon Woods for a postoperative appointment with me next week in [facility] [place]

## 2022-03-09 NOTE — PLAN OF CARE
Problem: PAIN - ADULT  Goal: Verbalizes/displays adequate comfort level or baseline comfort level  Description: Interventions:  - Encourage patient to monitor pain and request assistance  - Assess pain using appropriate pain scale  - Administer analgesics based on type and severity of pain and evaluate response  - Implement non-pharmacological measures as appropriate and evaluate response  - Consider cultural and social influences on pain and pain management  - Notify physician/advanced practitioner if interventions unsuccessful or patient reports new pain  Outcome: Progressing     Problem: INFECTION - ADULT  Goal: Absence or prevention of progression during hospitalization  Description: INTERVENTIONS:  - Assess and monitor for signs and symptoms of infection  - Monitor lab/diagnostic results  - Monitor all insertion sites, i e  indwelling lines, tubes, and drains  - Monitor endotracheal if appropriate and nasal secretions for changes in amount and color  - [place] appropriate cooling/warming therapies per order  - Administer medications as ordered  - Instruct and encourage patient and family to use good hand hygiene technique  - Identify and instruct in appropriate isolation precautions for identified infection/condition  Outcome: Progressing  Goal: Absence of fever/infection during neutropenic period  Description: INTERVENTIONS:  - Monitor WBC    Outcome: Progressing     Problem: DISCHARGE PLANNING  Goal: Discharge to home or other facility with appropriate resources  Description: INTERVENTIONS:  - Identify barriers to discharge w/patient and caregiver  - Arrange for needed discharge resources and transportation as appropriate  - Identify discharge learning needs (meds, wound care, etc )  - Arrange for interpretive services to assist at discharge as needed  - Refer to Case Management Department for coordinating discharge planning if the patient needs post-hospital services based on physician/advanced practitioner order or complex needs related to functional status, cognitive ability, or social support system  Outcome: Progressing     Problem: Knowledge Deficit  Goal: Patient/family/caregiver demonstrates understanding of disease process, treatment plan, medications, and discharge instructions  Description: Complete learning assessment and assess knowledge base    Interventions:  - Provide teaching at level of understanding  - Provide teaching via preferred learning methods  Outcome: Progressing

## 2022-03-09 NOTE — ANESTHESIA POSTPROCEDURE EVALUATION
Post-Op Assessment Note    CV Status:  Stable  Pain Score: 0    Pain management: adequate     Mental Status:  Sleepy   PONV Controlled:  Controlled   Airway Patency:  Patent   Two or more mitigation strategies used for obstructive sleep apnea   Post Op Vitals Reviewed: Yes      Staff: CRNA         No complications documented      BP (P) 104/53 (03/08/22 2319)    Temp (!) (P) 97 3 °F (36 3 °C) (03/08/22 2319)    Pulse (P) 102 (03/08/22 2319)   Resp (P) 18 (03/08/22 2319)    SpO2

## 2022-03-09 NOTE — PLAN OF CARE
Problem: PAIN - ADULT  Goal: Verbalizes/displays adequate comfort level or baseline comfort level  Description: Interventions:  - Encourage patient to monitor pain and request assistance  - Assess pain using appropriate pain scale  - Administer analgesics based on type and severity of pain and evaluate response  - Implement non-pharmacological measures as appropriate and evaluate response  - Consider cultural and social influences on pain and pain management  - Notify physician/advanced practitioner if interventions unsuccessful or patient reports new pain  Outcome: Progressing     Problem: INFECTION - ADULT  Goal: Absence or prevention of progression during hospitalization  Description: INTERVENTIONS:  - Assess and monitor for signs and symptoms of infection  - Monitor lab/diagnostic results  - Monitor all insertion sites, i e  indwelling lines, tubes, and drains  - Monitor endotracheal if appropriate and nasal secretions for changes in amount and color  - [place] appropriate cooling/warming therapies per order  - Administer medications as ordered  - Instruct and encourage patient and family to use good hand hygiene technique  - Identify and instruct in appropriate isolation precautions for identified infection/condition  Outcome: Progressing     Problem: Knowledge Deficit  Goal: Patient/family/caregiver demonstrates understanding of disease process, treatment plan, medications, and discharge instructions  Description: Complete learning assessment and assess knowledge base    Interventions:  - Provide teaching at level of understanding  - Provide teaching via preferred learning methods  Outcome: Progressing

## 2022-03-09 NOTE — DISCHARGE INSTRUCTIONS
Laparoscopic Excision of Ovarian Cysts   WHAT YOU NEED TO KNOW:   Laparoscopic excision is surgery to remove a cyst on your ovary  DISCHARGE INSTRUCTIONS:   Medicines:   · Antibiotics: This medicine is given to fight or prevent an infection caused by bacteria  Always take your antibiotics exactly as ordered by your healthcare provider  Do not stop taking your medicine unless directed by your healthcare provider  Never save antibiotics or take leftover antibiotics that were given to you for another illness  · Pain medicine: You may be given a prescription medicine to decrease pain  Do not wait until the pain is severe before you take this medicine  · Take your medicine as directed  Contact your healthcare provider if you think your medicine is not helping or if you have side effects  Tell him or her if you are allergic to any medicine  Keep a list of the medicines, vitamins, and herbs you take  Include the amounts, and when and why you take them  Bring the list or the pill bottles to follow-up visits  Carry your medicine list with you in case of an emergency  Follow up with your healthcare provider as directed:  Write down your questions so you remember to ask them during your visits  Contact your healthcare provider if:   · You have stomach cramps  · You have a fever  · You have questions or concerns about your condition or care  Seek care immediately or call 911 if:   · You have bleeding that does not stop  · You have severe abdominal pain that does not go away, even with medicine  · You feel lightheaded and short of breath  · You have chest pain when you take a deep breath or cough, or you cough up blood  · Your arm or leg feels warm, tender, and painful  It may look swollen and red  © 2017 [employer] Information is for End User's use only and may not be sold, redistributed or otherwise used for commercial purposes   All illustrations and images included in CareNotes® are the copyrighted property of A D A M , Inc  or Patrick Chung  The above information is an  only  It is not intended as medical advice for individual conditions or treatments  Talk to your doctor, nurse or pharmacist before following any medical regimen to see if it is safe and effective for you  Dilation and Curettage   WHAT YOU SHOULD KNOW:   Dilation and curettage (D and C) is a procedure to remove tissue from the lining of your uterus  You may have cramps and spotting or light bleeding for a few days  If you had general anesthesia, have someone stay with you for 24 hours  This is to make sure you do not have a reaction to the anesthesia  It may take a few months for your monthly period to start or become regular  AFTER YOU LEAVE:   Medicines:   · Pain medicine: You may be given medicine to take away or decrease pain  Do not wait until the pain is severe before you take your medicine  Tylenol and Motrin should be sufficient to control your pain    · Take your medicine as directed  Call your healthcare provider if you think your medicine is not helping or if you have side effects  Tell your provider if you are allergic to any medicine  Keep a list of the medicines, vitamins, and herbs you take  Include the amounts, and when and why you take them  Bring the list or the pill bottles to follow-up visits  Carry your medicine list with you in case of an emergency  Self-care:   · Use sanitary pads if needed  You may have light bleeding for up to 2 weeks  Do not use tampons  Use sanitary pads instead  This will help prevent a vaginal infection  · Rest as needed  Slowly start to do more each day  Return to your daily activities as directed  · Do not have sex for at least 2 weeks after the procedure  This will help prevent an infection  · Use birth control right after your procedure  Your monthly period should start again in 4 to 8 weeks   During this time, you could still ovulate (release an egg)  Use birth control as directed to prevent pregnancy during this time  Follow up with your healthcare provider as directed:  Write down your questions so you remember to ask them during your visits  Activity:  Ask your primary healthcare provider when you can return to your normal activities  Seek care immediately if:   · You have heavy vaginal bleeding that soaks 1 pad in 1 hour for 2 hours in a row  · You have a fever higher than 100 4°F (38°C)  · You have abdominal cramps for more than 2 days  · Your pain does not get better, even after treatment  · You have signs of an allergic reaction, such as hives, trouble breathing, or severe swelling  Contact your healthcare provider if:   · You have foul-smelling vaginal discharge  · You do not get your monthly period  · You feel depressed or anxious  · You feel very tired and weak  · You have questions or concerns about your condition or care  © 2014 [address] is for End User's use only and may not be sold, redistributed or otherwise used for commercial purposes  All illustrations and images included in CareNotes® are the copyrighted property of A D A M , Inc  or [employer]  The above information is an  only  It is not intended as medical advice for individual conditions or treatments  Talk to your doctor, nurse or pharmacist before following any medical regimen to see if it is safe and effective for you  Postoperative Narcotic Use    You may have been prescribed narcotic pain medication to assist with your postoperative pain control  Narcotic pain medication is excellent for treating postoperative pain, but it has several side effects and addictive properties  We have prepared the following guide to help control your pain while using the minimum amount of narcotic pain medication      Our goal is for your pain to be under control, and although you may not be completely pain-free immediately, we expect you to be comfortable, and we expect your pain to improve over time  Being pain free after surgery is a process that takes time  The following suggestions will help you through that process while minimizing the need for narcotic pain medication  Use non-narcotic methods of pain control first   Use ice packs over your incision to reduce pain and swelling  This will help minimize the need for medications   For the first few days after your surgery, take 650 mg of Acetaminophen (Tylenol) every 6 hours regularly   In addition, take 600 mg of Ibuprofen (Motrin) every 6 hours regularly   Using these medications can help prevent you from having severe pain and can reduce how much narcotic pain medication you need  Only use narcotics when the other methods are not adequate   For the first few days after surgery, you may need additional pain relief   You may take your narcotic medication every 4-6 hours   As your pain improves over time, you should stop taking narcotics and use the non-narcotic methods listed above  After the first few days, you should not require narcotic medications   You may continue to take 650 mg of Acetaminophen (Tylenol) every 6 hours regularly   For additional pain relief, you may take Ibuprofen (Motrin) 600 mg as needed   If you have unused tablets of narcotic medications, see below  Do not take more than 4,000 mg of Acetaminophen (Tylenol) in a 24-hour period  Please ask your doctor for guidance on amount of Acetaminophen (Tylenol) it is safe to take if you have known liver disease  If you have a sensitive stomach, please take Ibuprofen (Motrin) with food  Please ask your doctor for guidance on safety of Ibuprofen (Motrin) if you have known kidney disease or if you have a history of weight loss surgery  Do not drive or operate heavy machinery while using narcotic medications   Narcotics can cause constipation  Please be sure to drink lots of water, and your doctor may recommend use of a stool softener, such as docusate sodium (Colace) 100 mg twice per day or Senna 8 6 mg every night  A few days of postoperative narcotic use is safe while breastfeeding  Disposing of Your Unused Narcotic Medication    It is very likely that you will not need all of the pills you have been prescribed  They should not be used to treat other pain  They should not be shared with other people  They should not be saved  It is not safe to take  medications  It is not safe to keep them around the house  It is not safe to keep them within reach of children or pets  You should dispose of unused pills by taking them to a Controlled Substance Disposal Site  Controlled Substance Disposal Sites    A Controlled Substance Disposal Site is a pharmacy or other location where you may safely and anonymously dispose of unused medication  The following locations are available in the [facility] area  If your local pharmacy is not on the list, please call and ask if they have a Controlled Substance Disposal service  [address] [place] [address], [facility], [address] [facility] [address], [address] [facility], [address] [facility], [address] [place], [address] [facility] [address], [address]   [address] [place] [address], [facility], [address] departments may accept unused pills  Most local police departments have a drop box on their premises  Please contact your local police department for more information, or read additional information here: https://safe  pharmacy/drug-disposal/ [safe  pharmacy]     National Prescription Drug Take Back Day  There is a biannual National Prescription Drug Take Back Day, usually in April and October, when additional locations are available   [facility]  [facility] locations participate in National Prescription Drug Take Back Day  Information for that can be found here:  https://takebackday hazel gov/ [takebackday hazel gov]     You can find more information about prescription disposal here:   https://safe  pharmacy/drug-disposal/ [safe  pharmacy]   [employer]  aspx [ddap pa gov]     Thank you

## 2022-03-09 NOTE — OP NOTE
OPERATIVE REPORT  PATIENT NAME: Minna Dobbs    :  1995  MRN: [id number]  Pt Location: [facility] OR ROOM 01    SURGERY DATE: 3/8/2022    Surgeon(s) and Role:     * Myra Kwon MD - Primary     * Annabella Roy MD - Assisting    Preop Diagnosis:  Ectopic pregnancy without intrauterine pregnancy, unspecified location [O00 90]    Post-Op Diagnosis Codes:     * Ectopic pregnancy without intrauterine pregnancy, unspecified location [O00 90]    Procedure(s) (LRB):  LAPAROSCOPY DIAGNOSTIC, right ovarian cystectomy (N/A)  DILATATION AND CURETTAGE (D&C) (N/A)    Specimen(s):  ID Type Source Tests Collected by Time Destination   1 : right ovarian cyst wall Tissue Ovary, Cyst TISSUE EXAM Myra Kwon MD 3/8/2022 2245    2 : endometrial curettings Tissue Endometrium TISSUE EXAM Myra Kwon MD 3/8/2022 2249    3 : right ovarian cyst fluid Tissue Ovary, Cyst TISSUE EXAM Myra Kwon MD 3/8/2022 2301        Estimated Blood Loss:   50ml    Drains:  * No LDAs found *    Anesthesia Type:   General endotracheal anesthesia    Operative Indications:  Ectopic pregnancy without intrauterine pregnancy, unspecified location [O00 90]      Operative Findings:  Right ovarian hemorraghic cyst  Normal appearing right fallopian tube  Normal appearing left fallopian tube and ovary  Small amount of hemoperitoneum in the posterior culdesac      Complications:   None    Procedure and Technique:  Description of Procedure    Patient was taken to the operating room  General endotracheal anesthesia (GET) was administered and the patient was positioned on the OR table in the dorsal lithotomy position  All pressure points were padded and a madonna hugger was placed to maintain control of core body temperature  The patient was prepped and draped in the usual sterile fashion with chloroprep on the abdomen and chlorhexadine prep on the vagina and perineum      Operative Technique    A time out was performed to confirm correct patient and correct procedure  A 5 mm incision was made at Tong's point (3cm below the left costal margin in the midclavicular line) and a Veress needle was gently inserted into the umbilicus at a 45 degree angle  Once entry into the abdominal cavity was confirmed, the abdomen was insufflated with CO2 gas to 15 mmHg  Next, a 5 mm diagnostic laparoscope was inserted via direct entry  The entire abdomen and pelvis was inspected and there was no evidence of injury to bowel, bladder, vasculature, or other structures  Attention was then turned to the pelvis  Patient was placed in Trendelenburg  Two additional port sites were selected in the left and right lower abdomen approximately 2cm superior and medial to the iliac crests  A 5mm incision was made for introduction of a 5mm trocar under direct visualization at each site  A pelvic survey was completed and findings are as noted above  A blunt grasper was used to elevated the right ovary and needle aspiration of the cyst was performed with fluid contents being sent to pathology  The cyst was then opened with gentle traction and the cyst wall was removed  The ovary was noted to be hemostatic  Hemoperitoneum was evacuated using suction irrigation and Surgicel powder was placed over the right ovary  Attention was then turned down below  A weighted speculum was inserted into the vagina and a Stern retractor was used to visualize the anterior lip of the cervix, which was then grasped with a single toothed tenaculum  The cervix was serially dilated to 31 using Perez dilators  Sharp curetting was performed in a 360 degree manner and endometrial tissue was obtained and sent to pathology  The single toothed tenaculum was removed from the anterior lip of the cervix  Good hemostasis was confirmed at the tenaculum puncture sites  Weighted speculum was then removed from the vagina  Gloves were exchanged an attention was turned back above  Pneumoperitoneum was allowed to escape  Adequate hemostasis was visualized  The inferior trocars were removed under direct visualization  The laparoscope was withdrawn from the abdomen, followed by its trocar sleeve at the umbilicus  Skin incisions were closed with running absorbable suture of 4-0 monocryl  At the conclusion of the procedure, all needle, sponge, and instrument counts were noted to be correct x2  Patient tolerated the procedure well and was transferred to PACU in stable condition prior to discharge with follow up in 1-2 weeks  Dr Brown Parrish was present and participated in all key portions of the case        Patient Disposition:  PACU       SIGNATURE: Jenny Corea MD  DATE: March 8, 2022  TIME: 11:33 PM

## 2022-03-09 NOTE — PROGRESS NOTES
Progress Note - OB/GYN   Donya Lopez 32 y o  female MRN: [id number]  Unit/Bed#: [facility] 425-01 Encounter: [id number]    Assessment:  32 y o    1 Day Post-Op s/p diagnostic laparoscopy, right ovarian cystectomy, D&C for right ovarian hemorrhagic cyst, recovering well    Plan:  Postoperative  - continue routine postoperative care  - Patient will take more PO today and confirm meeting all milestones  - PO pain medications as needed  - Discussed surgical findings with patient, including lack of endometriotic scar tissue, small endometriosis implants along bladder surface, and hemorrhagic right ovarian cyst without overt findings of ectopic pregnancy  - Repeat beta hCG outpatient to trend to zero    Likely stable for discharge later this morning    Subjective/Objective   Subjective:     Pain: some discomfort, improved with PO meds  Tolerating PO: tolerated some sips, has not attempted food yet  Voiding: yes  Flatus: yes  BM: no  Ambulating: yes  Chest pain: no  Shortness of breath: no  Leg pain: no  Vaginal bleeding: minimal      Objective:     Vitals: Blood pressure 111/64, pulse 92, temperature (!) 97 2 °F (36 2 °C), resp  rate 18, last menstrual period 2022, SpO2 92 %  Physical Exam:     Physical Exam  Constitutional:       General: She is not in acute distress  Appearance: She is not ill-appearing or toxic-appearing  HENT:      Head: Normocephalic and atraumatic  Eyes:      Conjunctiva/sclera: Conjunctivae normal    Cardiovascular:      Rate and Rhythm: Normal rate  Pulses: Normal pulses  Pulmonary:      Effort: Pulmonary effort is normal  No respiratory distress  Abdominal:      General: There is no distension  Palpations: Abdomen is soft  Tenderness: There is abdominal tenderness (appropriate)  There is no guarding or rebound  Comments: Laparoscopic incisions c/d/i   Musculoskeletal:         General: Normal range of motion        Cervical back: Normal range of motion  Right lower leg: No edema  Left lower leg: No edema  Skin:     General: Skin is warm and dry  Neurological:      General: No focal deficit present  Mental Status: She is alert  Mental status is at baseline     Psychiatric:         Mood and Affect: Mood normal          Behavior: Behavior normal            Lab, Imaging and other studies:     Lab Results   Component Value Date    WBC 11 59 (H) 03/08/2022    HGB 14 7 03/08/2022    HCT 41 4 03/08/2022    MCV 85 03/08/2022     03/08/2022               Ariella Jack MD  03/09/22

## 2022-03-11 ENCOUNTER — APPOINTMENT (OUTPATIENT)
Dept: LAB | Age: 27
End: 2022-03-11
Payer: COMMERCIAL

## 2022-03-11 DIAGNOSIS — O02.1 MISSED AB: ICD-10-CM

## 2022-03-11 LAB — B-HCG SERPL-ACNC: 3 MIU/ML

## 2022-03-11 PROCEDURE — 36415 COLL VENOUS BLD VENIPUNCTURE: CPT

## 2022-03-11 PROCEDURE — 84702 CHORIONIC GONADOTROPIN TEST: CPT

## 2022-03-13 PROBLEM — Z98.890 S/P OVARIAN CYSTECTOMY: Status: ACTIVE | Noted: 2022-03-13

## 2022-03-13 PROBLEM — Z87.42 S/P OVARIAN CYSTECTOMY: Status: ACTIVE | Noted: 2022-03-13

## 2022-03-13 PROBLEM — Z98.890 S/P D&C (STATUS POST DILATION AND CURETTAGE): Status: ACTIVE | Noted: 2022-03-13

## 2022-03-13 NOTE — ASSESSMENT & PLAN NOTE
Repeat 3/11/2022 bHCG WNL 3  Resolved    Can restart prenatal vitamin at any point at this time  Discussed can start trying for pregnancy at any point (after 2 weeks postop)    Moving forward when positive home UPT to call, track serial bHCG to ensure rising appropriately

## 2022-03-16 ENCOUNTER — OFFICE VISIT (OUTPATIENT)
Dept: OBGYN CLINIC | Facility: CLINIC | Age: 27
End: 2022-03-16

## 2022-03-16 VITALS — BODY MASS INDEX: 35.05 KG/M2 | WEIGHT: 223.8 LBS | DIASTOLIC BLOOD PRESSURE: 74 MMHG | SYSTOLIC BLOOD PRESSURE: 128 MMHG

## 2022-03-16 DIAGNOSIS — Z33.1 PELVIC PAIN WITH POSITIVE BETA-HUMAN CHORIONIC GONADOTROPIN (BHCG) IN FEMALE: ICD-10-CM

## 2022-03-16 DIAGNOSIS — Z98.890 S/P OVARIAN CYSTECTOMY: Primary | ICD-10-CM

## 2022-03-16 DIAGNOSIS — Z87.42 S/P OVARIAN CYSTECTOMY: Primary | ICD-10-CM

## 2022-03-16 DIAGNOSIS — R10.2 PELVIC PAIN WITH POSITIVE BETA-HUMAN CHORIONIC GONADOTROPIN (BHCG) IN FEMALE: ICD-10-CM

## 2022-03-16 DIAGNOSIS — Z98.890 S/P D&C (STATUS POST DILATION AND CURETTAGE): ICD-10-CM

## 2022-03-16 PROCEDURE — 99024 POSTOP FOLLOW-UP VISIT: CPT | Performed by: STUDENT IN AN ORGANIZED HEALTH CARE EDUCATION/TRAINING PROGRAM

## 2022-03-16 NOTE — PROGRESS NOTES
CC: postoperative appointment  Chief Complaint   Patient presents with    Post-op     1 week post op check  had a D&C, R ovarian cystectomy for ectopic preg  pt reports having tenderness/soreness  feels tired  has some spotting  incisions feel sharp/"stabby " working all day, but moving around [address] [facility] despite being on light duty        Assessment/Plan   32 y o  Karin May s/p operative laparoscopy, ovarian cystectomy, D+C doing well  Problem List Items Addressed This Visit        Other    Pelvic pain with positive beta-human chorionic gonadotropin (BhCG) in female     Repeat 3/11/2022 bHCG WNL 3  Resolved    Can restart prenatal vitamin at any point at this time  Discussed can start trying for pregnancy at any point (after 2 weeks postop)    Moving forward when positive home UPT to call, track serial bHCG to ensure rising appropriately         S/P D&C (status post dilation and curettage)     Benign  Plan for pelvic rest, avoid baths until 2 weeks postop         S/P ovarian cystectomy - Primary     Reviewed pathology and intraoperative pictures  Hemorrhagic corpus luteal cyst, no evidence ectopic    Healing well  Plan for abdominal binder to help with pain at work  Will bring any light duty/leave papers to the office     Preserved pelvic anatomy despite endometriosis noted               Reviewed pathology results, benign  Copy given to patient for her records  Return for annual or PRN   ----------------------------------------------    History of Present Illness     Rolly Cortez is a 32 y o  female  who presents as a postoperative appointment  She underwent laparoscopic ovarian cystectomy and D+C on 3/8/2022 for an indication of persistent fluctuating bHCG and new onset of pelvic pain and right adnexal mass measuring 4 cm  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  Spotting only since surgery       Wondering when can try again for future pregnancy    REVIEW OF SYSTEMS  12 point review of systems negative aside from HPI  Past Medical History:   Diagnosis Date    Allergic rhinitis     Asthma     Endometriosis     POTS (postural orthostatic tachycardia syndrome)      Past Surgical History:   Procedure Laterality Date    DILATION AND CURETTAGE OF UTERUS N/A 3/8/2022    Procedure: DILATATION AND CURETTAGE (D&C); Surgeon: Delfina Corbin MD;  [facility];  Service: Gynecology    ENDOMETRIAL ABLATION      LAPAROSCOPIC ENDOMETRIOSIS FULGURATION  03/03/2021    [place] LAP,DIAGNOSTIC ABDOMEN N/A 3/8/2022    Procedure: LAPAROSCOPY DIAGNOSTIC, right ovarian cystectomy;  Surgeon: Delfina Corbin MD;  [facility];  Service: Gynecology       Current Outpatient Medications:     acetaminophen (TYLENOL) 325 mg tablet, Take 3 tablets (975 mg total) by mouth every 6 (six) hours, Disp: , Rfl: 0    ibuprofen (MOTRIN) 600 mg tablet, Take 1 tablet (600 mg total) by mouth every 6 (six) hours as needed for moderate pain, Disp: 30 tablet, Rfl: 0    Prenatal MV-Min-FA-Omega-3 (PRENATAL GUMMIES/DHA & FA PO), Take by mouth, Disp: , Rfl:     fluticasone (FLONASE) 50 mcg/act nasal spray, 2 sprays into each nostril daily, Disp: 9 9 mL, Rfl: 0    oxyCODONE (ROXICODONE) 5 immediate release tablet, Take 1 tablet (5 mg total) by mouth every 4 (four) hours as needed for severe pain for up to 7 doses Max Daily Amount: 30 mg (Patient not taking: Reported on 3/16/2022 ), Disp: 7 tablet, Rfl: 0  No Known Allergies    Objective   Vitals: Blood pressure 128/74, weight 102 kg (223 lb 12 8 oz), last menstrual period 01/06/2022, unknown if currently breastfeeding  Body mass index is 35 05 kg/m²  General: NAD, AAOx3  Heart: non-tachycardic  Lungs: non-labored breathing, symmetric chest rise    Abdomen: soft, appropriately tender to palpation, non-tympanic, non-distended  Trocar sites reapproximated with subcuticular suture clean/dry/intact, no erythema/induration/discharge noted      RLQ trocar site with blister consistent with tape removal site    Lab Results:   No visits with results within 1 Day(s) from this visit     Latest known visit with results is:   Appointment on 03/11/2022   Component Date Value    HCG, Quant 03/11/2022 3

## 2022-03-16 NOTE — ASSESSMENT & PLAN NOTE
Reviewed pathology and intraoperative pictures  Hemorrhagic corpus luteal cyst, no evidence ectopic    Healing well  Plan for abdominal binder to help with pain at work  Will bring any light duty/leave papers to the office     Preserved pelvic anatomy despite endometriosis noted

## 2022-03-17 ENCOUNTER — TELEPHONE (OUTPATIENT)
Dept: OBGYN CLINIC | Facility: CLINIC | Age: 27
End: 2022-03-17

## 2022-03-17 NOTE — TELEPHONE ENCOUNTER
Work Letter    Jose Luis [place]  1995  [id number]    To whom it may concern,     03/17/22        Your employee is a patient at [facility]  She underwent surgery on 3/8/2022  We recommend that postoperatively patients avoid any heavy lifting above 10 lbs until two weeks postoperatively  This is not a disability letter, only recommendations by the OBGYN         Sincerely,      Willie Shipman MD

## 2022-03-17 NOTE — TELEPHONE ENCOUNTER
Pt seen 3/16 with A M  (post op) Pt needs an After Surgery (3/8) note/letter for work on restrictions,  pls put this in pts [address] so she can get this to her employer,  Thanks

## 2022-03-17 NOTE — LETTER
March 18, 2022     Patient: Nirav Pena   YOB: 1995   Date of Visit: 3/17/2022       To Whom it May Concern:      Work Letter     Nirav Pena  1995  [phone] [phone]     To whom it may concern,                                           03/17/22         Your employee is a patient at [facility]   She underwent surgery on 3/8/2022      We recommend that postoperatively patients avoid any heavy lifting above 10 lbs until two weeks postoperatively       This is not a disability letter, only recommendations by the OBGYN          Sincerely,        Guru Mcpherson MD

## 2022-03-18 NOTE — TELEPHONE ENCOUNTER
Called and spoke to pt informing letter in her My Chart  Told her to call with any questions/concerns  She verbalized understanding to all

## 2022-04-18 ENCOUNTER — OFFICE VISIT (OUTPATIENT)
Dept: URGENT CARE | Age: 27
End: 2022-04-18
Payer: COMMERCIAL

## 2022-04-18 VITALS
HEIGHT: 67 IN | WEIGHT: 220 LBS | HEART RATE: 88 BPM | OXYGEN SATURATION: 98 % | TEMPERATURE: 96 F | BODY MASS INDEX: 34.53 KG/M2 | RESPIRATION RATE: 18 BRPM

## 2022-04-18 DIAGNOSIS — J30.2 SEASONAL ALLERGIC RHINITIS, UNSPECIFIED TRIGGER: Primary | ICD-10-CM

## 2022-04-18 PROCEDURE — S9083 URGENT CARE CENTER GLOBAL: HCPCS | Performed by: PHYSICIAN ASSISTANT

## 2022-04-18 PROCEDURE — G0382 LEV 3 HOSP TYPE B ED VISIT: HCPCS | Performed by: PHYSICIAN ASSISTANT

## 2022-04-18 RX ORDER — AZELASTINE 1 MG/ML
1 SPRAY, METERED NASAL 2 TIMES DAILY
Qty: 1 ML | Refills: 0 | Status: SHIPPED | OUTPATIENT
Start: 2022-04-18

## 2022-04-18 RX ORDER — LEVOCETIRIZINE DIHYDROCHLORIDE 5 MG/1
5 TABLET, FILM COATED ORAL EVERY EVENING
Qty: 30 TABLET | Refills: 0 | Status: SHIPPED | OUTPATIENT
Start: 2022-04-18

## 2022-04-18 RX ORDER — ALBUTEROL SULFATE 2.5 MG/3ML
2.5 SOLUTION RESPIRATORY (INHALATION) EVERY 6 HOURS PRN
Qty: 90 ML | Refills: 0 | Status: SHIPPED | OUTPATIENT
Start: 2022-04-18

## 2022-04-18 NOTE — LETTER
April 18, 2022     Patient: Ruben Cowan   YOB: 1995   Date of Visit: 4/18/2022       To Whom It May Concern: It is my medical opinion that Ruben Cowan may return to work on 04/19/2022  If you have any questions or concerns, please don't hesitate to call           Sincerely,        NATHAN VARMA    CC: No Recipients

## 2022-04-18 NOTE — PATIENT INSTRUCTIONS
Take medications as prescribed   Continue Flonase  Recommend sinus rinse with NetiPot 1-2 times daily  Follow instructions on packaging  If symptoms do not improve in 3-5 days, follow-up with PCP  If symptoms worsen, report to the emergency department  Allergic Rhinitis   AMBULATORY CARE:   Allergic rhinitis , or hay fever, is swelling of the inside of your nose  The swelling is a reaction to allergens in the air  An allergen can be anything that causes an allergic reaction  Allergies to weeds, grass, trees, or mold often cause seasonal allergic rhinitis  Indoor dust mites, cockroaches, pet dander, or mold can also cause allergic rhinitis  Common signs and symptoms include the following:   · Sneezing    · Nasal congestion    · Runny nose    · Itchy nose, eyes, or mouth    · Red, watery eyes    · Postnasal drip (nasal drainage down the back of your throat)    · Cough or frequent throat clearing    · Feeling tired or lethargic    · Dark circles under your eyes    Call 911 for the following:   · You have chest pain or shortness of breath  Seek care immediately if:   · You have severe pain  · You cough up blood  Contact your healthcare provider if:   · You have a fever  · You have ear or sinus pain, or a headache  · Your symptoms get worse, even after treatment  · You have yellow, green, brown, or bloody mucus coming from your nose  · Your nose is bleeding or you have pain inside your nose  · You have trouble sleeping because of your symptoms  · You have questions or concerns about your condition or care  Treatment:   · Antihistamines  help reduce itching, sneezing, and a runny nose  Some antihistamines can make you sleepy  · Nasal steroids  help decrease inflammation in your nose  · Decongestants  help clear your stuffy nose  · Immunotherapy  may be needed if your symptoms are severe or other treatments do not work   Immunotherapy is used to inject an allergen into your skin  At first, the therapy contains tiny amounts of the allergen  Your healthcare provider will slowly increase the amount of allergen  This may help your body be less sensitive to the allergen and stop reacting to it  You may need immunotherapy for weeks or longer  Manage allergic rhinitis:  The best way to manage allergic rhinitis is to avoid allergens that can trigger your symptoms  Any of the following may help decrease your symptoms:  · Rinse your nose and sinuses  with a salt water solution or use a salt water nasal spray  This will help thin the mucus in your nose and rinse away pollen and dirt  It will also help reduce swelling so you can breathe normally  Ask your healthcare provider how often to rinse your nose  · Reduce exposure to dust mites  Wash sheets and towels in hot water every week  Cover your pillows and mattresses with allergen-free covers  Limit the number of stuffed animals and soft toys your child has  Wash your child's toys in hot water regularly  Vacuum weekly and use a vacuum  with an air filter  If possible, get rid of carpets and curtains  These collect dust and dust mites  · Reduce exposure to pollen  Keep windows and doors closed in your house and car  Stay inside when air pollution or the pollen count is high  Run your air conditioner on recycle, and change air filters often  Shower and wash your hair before bed every night to rinse away pollen  · Reduce exposure to pet dander  If possible, do not keep cats, dogs, birds, or other pets  If you do keep pets in your home, keep them out of bedrooms and carpeted rooms  Bathe them often  · Reduce exposure to mold  Do not spend time in basements  Choose artificial plants instead of live plants  Keep your home's humidity at less than 45%  Do not have ponds or standing water in your home or yard  · Do not smoke  Avoid others who smoke   Ask your healthcare provider for information if you currently smoke and need help to quit  Follow up with your doctor as directed:  Write down your questions so you remember to ask them during your visits  © Copyright [employer] 2022 Information is for End User's use only and may not be sold, redistributed or otherwise used for commercial purposes  All illustrations and images included in CareNotes® are the copyrighted property of [employer]  or [facility] Deyvi Zimmer   The above information is an  only  It is not intended as medical advice for individual conditions or treatments  Talk to your doctor, nurse or pharmacist before following any medical regimen to see if it is safe and effective for you

## 2022-04-18 NOTE — PROGRESS NOTES
[facility]        NAME: Kusum Garcia is a 32 y o  female  : 1995    MRN: [id number]  DATE: 2022  TIME: 8:52 AM    Assessment and Plan   Seasonal allergic rhinitis, unspecified trigger [J30 2]  1  Seasonal allergic rhinitis, unspecified trigger  levocetirizine (XYZAL) 5 MG tablet    azelastine (ASTELIN) 0 1 % nasal spray    albuterol (2 5 mg/3 mL) 0 083 % nebulizer solution    [facility]ulatory Ref[facility] to [facility]   Pt presents with symptoms and examination consistent with allergic rhinitis  She is provided with an rx for Xyzal, Astelin, and refill of her albuterol nebulizer solution  She does not have a PCP so referral is placed and she is provided with a list of providers in the area  Pt is instructed to establish with a PCP and follow-up with them if symptoms do not improve in 3-5 days  Discussed only to use Astelin for 3 days duration and then take a break for several days to avoid rhinitis medicamentosa  If symptoms worsen pt develops shortness of breath or chest pain, she will report to the emergency department  Patient Instructions     Patient Instructions   Take medications as prescribed   Continue Flonase  Recommend sinus rinse with NetiPot 1-2 times daily  Follow instructions on packaging  If symptoms do not improve in 3-5 days, follow-up with PCP  If symptoms worsen, report to the emergency department  Allergic Rhinitis   AMBULATORY CARE:   Allergic rhinitis , or hay fever, is swelling of the inside of your nose  The swelling is a reaction to allergens in the air  An allergen can be anything that causes an allergic reaction  Allergies to weeds, grass, trees, or mold often cause seasonal allergic rhinitis  Indoor dust mites, cockroaches, pet dander, or mold can also cause allergic rhinitis     Common signs and symptoms include the following:   · Sneezing    · Nasal congestion    · Runny nose    · Itchy nose, eyes, or mouth    · Red, watery eyes    · Postnasal drip (nasal drainage down the back of your throat)    · Cough or frequent throat clearing    · Feeling tired or lethargic    · Dark circles under your eyes    Call 911 for the following:   · You have chest pain or shortness of breath  Seek care immediately if:   · You have severe pain  · You cough up blood  Contact your healthcare provider if:   · You have a fever  · You have ear or sinus pain, or a headache  · Your symptoms get worse, even after treatment  · You have yellow, green, brown, or bloody mucus coming from your nose  · Your nose is bleeding or you have pain inside your nose  · You have trouble sleeping because of your symptoms  · You have questions or concerns about your condition or care  Treatment:   · Antihistamines  help reduce itching, sneezing, and a runny nose  Some antihistamines can make you sleepy  · Nasal steroids  help decrease inflammation in your nose  · Decongestants  help clear your stuffy nose  · Immunotherapy  may be needed if your symptoms are severe or other treatments do not work  Immunotherapy is used to inject an allergen into your skin  At first, the therapy contains tiny amounts of the allergen  Your healthcare provider will slowly increase the amount of allergen  This may help your body be less sensitive to the allergen and stop reacting to it  You may need immunotherapy for weeks or longer  Manage allergic rhinitis:  The best way to manage allergic rhinitis is to avoid allergens that can trigger your symptoms  Any of the following may help decrease your symptoms:  · Rinse your nose and sinuses  with a salt water solution or use a salt water nasal spray  This will help thin the mucus in your nose and rinse away pollen and dirt  It will also help reduce swelling so you can breathe normally  Ask your healthcare provider how often to rinse your nose  · Reduce exposure to dust mites    Wash sheets and towels in hot water every week  Cover your pillows and mattresses with allergen-free covers  Limit the number of stuffed animals and soft toys your child has  Wash your child's toys in hot water regularly  Vacuum weekly and use a vacuum  with an air filter  If possible, get rid of carpets and curtains  These collect dust and dust mites  · Reduce exposure to pollen  Keep windows and doors closed in your house and car  Stay inside when air pollution or the pollen count is high  Run your air conditioner on recycle, and change air filters often  Shower and wash your hair before bed every night to rinse away pollen  · Reduce exposure to pet dander  If possible, do not keep cats, dogs, birds, or other pets  If you do keep pets in your home, keep them out of bedrooms and carpeted rooms  Bathe them often  · Reduce exposure to mold  Do not spend time in basements  Choose artificial plants instead of live plants  Keep your home's humidity at less than 45%  Do not have ponds or standing water in your home or yard  · Do not smoke  Avoid others who smoke  Ask your healthcare provider for information if you currently smoke and need help to quit  Follow up with your doctor as directed:  Write down your questions so you remember to ask them during your visits  © Copyright [employer] 2022 Information is for End User's use only and may not be sold, redistributed or otherwise used for commercial purposes  All illustrations and images included in CareNotes® are the copyrighted property of A [employer] A M [employer] Inc  or [facility] Deyvi Zimmer   The above information is an  only  It is not intended as medical advice for individual conditions or treatments  Talk to your doctor, nurse or pharmacist before following any medical regimen to see if it is safe and effective for you  Follow up with PCP in 3-5 days  Proceed to  ER if symptoms worsen      Chief Complaint     Chief Complaint   Patient presents with    Allergies nasal congestion, post nasal drip, and cough  started on thursday  Pt states that she took an at home COVID test and it was negative          History of Present Illness       32year old female presents with complaint of     Cough  This is a new problem  Episode onset: 5 days ago  The problem has been unchanged  The problem occurs every few hours  The cough is non-productive  Associated symptoms include headaches (sinus pressure), nasal congestion, postnasal drip and rhinorrhea  Pertinent negatives include no chest pain, chills, fever, sore throat or shortness of breath (no more than usual with asthma)  Nothing aggravates the symptoms  She has tried nothing (pt reports that she does not know what to take) for the symptoms  Her past medical history is significant for asthma and environmental allergies  Review of Systems   Review of Systems   Constitutional: Negative for chills and fever  HENT: Positive for congestion, postnasal drip, rhinorrhea and sinus pressure  Negative for sore throat  Respiratory: Positive for cough (secondary to PND)  Negative for shortness of breath (no more than usual with asthma)  Cardiovascular: Negative for chest pain  Gastrointestinal: Positive for nausea (attributed to PND)  Negative for diarrhea and vomiting  Allergic/Immunologic: Positive for environmental allergies  Neurological: Positive for headaches (sinus pressure)           Current Medications       Current Outpatient Medications:     Prenatal MV-Min-FA-Omega-3 (PRENATAL GUMMIES/DHA & FA PO), Take by mouth, Disp: , Rfl:     acetaminophen (TYLENOL) 325 mg tablet, Take 3 tablets (975 mg total) by mouth every 6 (six) hours (Patient not taking: Reported on 4/18/2022 ), Disp: , Rfl: 0    albuterol (2 5 mg/3 mL) 0 083 % nebulizer solution, Take 3 mL (2 5 mg total) by nebulization every 6 (six) hours as needed for wheezing or shortness of breath, Disp: 90 mL, Rfl: 0    azelastine (ASTELIN) 0 1 % nasal spray, 1 spray into each nostril 2 (two) times a day Use in each nostril as directed, Disp: 1 mL, Rfl: 0    fluticasone (FLONASE) 50 mcg/act nasal spray, 2 sprays into each nostril daily, Disp: 9 9 mL, Rfl: 0    ibuprofen (MOTRIN) 600 mg tablet, Take 1 tablet (600 mg total) by mouth every 6 (six) hours as needed for moderate pain (Patient not taking: Reported on 4/18/2022 ), Disp: 30 tablet, Rfl: 0    levocetirizine (XYZAL) 5 MG tablet, Take 1 tablet (5 mg total) by mouth every evening, Disp: 30 tablet, Rfl: 0    oxyCODONE (ROXICODONE) 5 immediate release tablet, Take 1 tablet (5 mg total) by mouth every 4 (four) hours as needed for severe pain for up to 7 doses Max Daily Amount: 30 mg (Patient not taking: Reported on 3/16/2022 ), Disp: 7 tablet, Rfl: 0    Current Allergies     Allergies as of 04/18/2022    (No Known Allergies)            The following portions of the patient's history were reviewed and updated as appropriate: allergies, current medications, past family history, past medical history, past social history, past surgical history and problem list      Past Medical History:   Diagnosis Date    Allergic rhinitis     Asthma     Endometriosis     POTS (postural orthostatic tachycardia syndrome)        Past Surgical History:   Procedure Laterality Date    DILATION AND CURETTAGE OF UTERUS N/A 3/8/2022    Procedure: DILATATION AND CURETTAGE (D&C); Surgeon: Andrei Lagos MD;  [facility]: [facility];  Service: Gynecology    ENDOMETRIAL ABLATION      LAPAROSCOPIC ENDOMETRIOSIS FULGURATION  03/03/2021    [place] LAP,DIAGNOSTIC ABDOMEN N/A 3/8/2022    Procedure: LAPAROSCOPY DIAGNOSTIC, right ovarian cystectomy;  Surgeon: Andrei Lagos MD;  [facility];  Service: Gynecology       Family History   Family history unknown: Yes         Medications have been verified          Objective   Pulse 88   Temp (!) 96 °F (35 6 °C)   Resp 18   Ht 5' 7" (1 702 m)   Wt 99 8 kg (220 lb)   SpO2 98%   BMI 34 46 kg/m² No LMP recorded  Physical Exam     Physical Exam  Vitals and nursing note reviewed  Constitutional:       General: She is awake  She is not in acute distress  Appearance: Normal appearance  She is well-developed and well-groomed  She is not ill-appearing, toxic-appearing or diaphoretic  HENT:      Head: Normocephalic and atraumatic  Right Ear: Hearing, tympanic membrane, ear canal and external ear normal  There is no impacted cerumen  No foreign body  Left Ear: Hearing, tympanic membrane, ear canal and external ear normal  There is no impacted cerumen  No foreign body  Nose: Mucosal edema, congestion and rhinorrhea present  No nasal tenderness  Rhinorrhea is clear  Right Nostril: No foreign body, epistaxis or occlusion  Left Nostril: No foreign body, epistaxis or occlusion  Right Turbinates: Enlarged and swollen  Not pale  Left Turbinates: Enlarged and swollen  Not pale  Mouth/Throat:      Lips: Pink  No lesions  Mouth: Mucous membranes are moist  No injury, oral lesions or angioedema  Dentition: Normal dentition  Tongue: No lesions  Tongue does not deviate from midline  Palate: No mass and lesions  Pharynx: Uvula midline  Posterior oropharyngeal erythema present  No pharyngeal swelling, oropharyngeal exudate or uvula swelling  Tonsils: No tonsillar exudate or tonsillar abscesses  Eyes:      General: Lids are normal  Vision grossly intact  Gaze aligned appropriately  Allergic shiner present  Conjunctiva/sclera: Conjunctivae normal    Cardiovascular:      Rate and Rhythm: Normal rate and regular rhythm  Heart sounds: Normal heart sounds, S1 normal and S2 normal  Heart sounds not distant  No murmur heard  No friction rub  No gallop  Pulmonary:      Effort: Pulmonary effort is normal       Breath sounds: Normal breath sounds  No decreased breath sounds, wheezing, rhonchi or rales        Comments: Patient is speaking in full sentences with no increased respiratory effort  No audible wheezing or stridor  Musculoskeletal:      Cervical back: Normal range of motion  Lymphadenopathy:      Cervical: No cervical adenopathy  Skin:     General: Skin is warm and dry  Neurological:      Mental Status: She is alert and oriented to person, place, and time  Coordination: Coordination is intact  Gait: Gait is intact  Psychiatric:         Attention and Perception: Attention and perception normal          Mood and Affect: Mood and affect normal          Speech: Speech normal          Behavior: Behavior normal  Behavior is cooperative  Note: Portions of this record may have been created with voice recognition software  Occasional wrong word or "sound a like" substitutions may have occurred due to the inherent limitations of voice recognition software  Please read the chart carefully and recognize, using context, where substitutions have occurred  *

## 2022-05-09 ENCOUNTER — TELEPHONE (OUTPATIENT)
Dept: PSYCHIATRY | Facility: CLINIC | Age: 27
End: 2022-05-09

## 2022-05-09 NOTE — TELEPHONE ENCOUNTER
Behavorial Health Outpatient Intake Questions    Referred by: self    Please advised interviewee that they need to answer all questions truthfully to allow for best care and any misrepresentations of information may affect their ability to be seen at this clinic   => Was this discussed? Yes     Behavorial Health Outpatient Intake History -     Presenting Problem (in patient's words): Complex ptsd,axciety and coping skills  Are there any developmental disabilities? ? If yes, can they speak to you on the phone? If they are too limited to speak to you on phone, refer out No    Are you taking any psychiatric medications? No    => If yes, who prescribes? If yes, are they injectable medications? Does the patient have a language barrier or hearing impairment? No    Have you been treated at [address] [place] [address] by a therapist or a doctor in the past? If yes, who? No    Has the patient been hospitalized for mental health? No   If yes, how long ago was last hospitalization and where was it? Do you actively use alcohol or marijuana or illegal substances? If yes, what and how much - refer out to Drug and alcohol treatment if use is excessive or daily use of illegal substances No concerns of substance abuse are reported  Do you have a community treatment team or ? No    Legal History-     Does the patient have any history of arrests, correction/custodial time, or DUIs? No  If Yes-  1) What types of charges? 2) When were they last incarcerated? 3) Are they currently on parole or probation? Minor Child-    Who has custody of the child? Is there a custody agreement? If there is a custody agreement remind parent that they must bring a copy to the first appt or they will not be seen       Intake Team, please check with provider before scheduling if flags come up such as:  - complex case  - legal history (other than DUI)  - communication barrier concerns are present  - if, in your judgment, this needs further review    ACCEPTED as a patient Yes  => Appointment Date: 5/23/2022 @ 1030 with Carmelo Branham    Referred Elsewhere? Name of Insurance Co: [id number] ID# [id number]  Insurance Phone #  If ins is primary or secondary: Primary  If patient is a minor, parents information such as Name, D  O B of guarantor

## 2022-05-23 ENCOUNTER — TELEMEDICINE (OUTPATIENT)
Dept: PSYCHIATRY | Facility: CLINIC | Age: 27
End: 2022-05-23

## 2022-05-23 DIAGNOSIS — F43.10 PTSD (POST-TRAUMATIC STRESS DISORDER): Primary | ICD-10-CM
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
## 2022-06-07 ENCOUNTER — TELEMEDICINE (OUTPATIENT)
Dept: PSYCHIATRY | Facility: CLINIC | Age: 27
End: 2022-06-07

## 2022-06-07 DIAGNOSIS — F43.10 PTSD (POST-TRAUMATIC STRESS DISORDER): Primary | ICD-10-CM

## 2022-06-07 NOTE — PSYCH
Psychotherapy Provided: Individual Psychotherapy 50 minutes     Length of time in session: 45 minutes, follow up in 2 week    Encounter Diagnosis     ICD-10-CM    1  PTSD (post-traumatic stress disorder)  F43 10        Goals addressed in session: Goal 1     Pain:      none    0    Current suicide risk : Low     D:  Ne Baires talked about feeling happy but some impending doom over being busy soon  High points - got flowers at work from , and spent time with him in the hot tub  Low points of week - low moments in the week - that seem catastrophic in the moment  - Have "temper tantrums" in the moment at times  Drained afterwards at times  Insecurities will trigger these moments - emotions of insecurity, abandonment, "I can't be loved," etc and then I spiral down and "why would anyone want to love someone like me?" Love language for Ne Baires is touch and verbal reassurance, and 's is not  I need reassurance in the moment and he doesn't understand that  Identified feeling critiqued by her mother in law for not doing enough, and by feeling like she is "in trouble" with her  or others  Ne Baires talked about growing up in a dysfunctional house that was cluttered  Therapist and Ne Baires made a treatment plan  Ne Baires agreed to treatment plan  A:  Ne Baires appeared open and calm and was receptive to the therapeutic suggestions of becoming mindful of when she is triggered into feeling insecure and needing reassurance  P: Ne Baires will meet with therapist in another 2 weeks  Ne Baires will be mindful of what triggers feelings of insecurity  Behavioral Health Treatment Plan [facility]: Diagnosis and Treatment Plan explained to Yaquelin devi understanding diagnosis and is agreeable to Treatment Plan   Yes     Virtual Regular Visit    Verification of patient location:    Patient is located in the following state in which I hold an active license [place]      Assessment/Plan:    Problem List Items Addressed This Visit        Other    PTSD (post-traumatic stress disorder) - Primary          Goals addressed in session: Goal 1          Reason for visit is   Chief Complaint   Patient presents with    Virtual Regular Visit        Encounter provider Anil Chavez LCSW    Provider located at [address] [place] [address] [id number] [phone]      Recent Visits  No visits were found meeting these conditions  Showing recent visits within past 7 days and meeting all other requirements  Future Appointments  No visits were found meeting these conditions  Showing future appointments within next 150 days and meeting all other requirements       The patient was identified by name and date of birth  Alisha Melchor was informed that this is a telemedicine visit and that the visit is being conducted throughOwlr and patient was informed that this is a secure, HIPAA-compliant platform  She agrees to proceed     My office door was closed  No one else was in the room  She acknowledged consent and understanding of privacy and security of the video platform  The patient has agreed to participate and understands they can discontinue the visit at any time  Patient is aware this is a billable service  Subjective  Alisha Melchor is a 32 y o  female    HPI     Past Medical History:   Diagnosis Date    Allergic rhinitis     Asthma     Endometriosis     POTS (postural orthostatic tachycardia syndrome)        Past Surgical History:   Procedure Laterality Date    DILATION AND CURETTAGE OF UTERUS N/A 3/8/2022    Procedure: DILATATION AND CURETTAGE (D&C);   Surgeon: Thompson Hamilton MD;  [facility];  Service: Gynecology    ENDOMETRIAL ABLATION      LAPAROSCOPIC ENDOMETRIOSIS FULGURATION  03/03/2021    [place] LAP,DIAGNOSTIC ABDOMEN N/A 3/8/2022    Procedure: LAPAROSCOPY DIAGNOSTIC, right ovarian cystectomy;  Surgeon: Vinicius Cantu MD;  [facility];  Service: Gynecology       Current Outpatient Medications   Medication Sig Dispense Refill    acetaminophen (TYLENOL) 325 mg tablet Take 3 tablets (975 mg total) by mouth every 6 (six) hours (Patient not taking: Reported on 4/18/2022 )  0    albuterol (2 5 mg/3 mL) 0 083 % nebulizer solution Take 3 mL (2 5 mg total) by nebulization every 6 (six) hours as needed for wheezing or shortness of breath 90 mL 0    azelastine (ASTELIN) 0 1 % nasal spray 1 spray into each nostril 2 (two) times a day Use in each nostril as directed 1 mL 0    fluticasone (FLONASE) 50 mcg/act nasal spray 2 sprays into each nostril daily 9 9 mL 0    ibuprofen (MOTRIN) 600 mg tablet Take 1 tablet (600 mg total) by mouth every 6 (six) hours as needed for moderate pain (Patient not taking: Reported on 4/18/2022 ) 30 tablet 0    levocetirizine (XYZAL) 5 MG tablet Take 1 tablet (5 mg total) by mouth every evening 30 tablet 0    oxyCODONE (ROXICODONE) 5 immediate release tablet Take 1 tablet (5 mg total) by mouth every 4 (four) hours as needed for severe pain for up to 7 doses Max Daily Amount: 30 mg (Patient not taking: Reported on 3/16/2022 ) 7 tablet 0    Prenatal MV-Min-FA-Omega-3 (PRENATAL GUMMIES/DHA & FA PO) Take by mouth       No current facility-administered medications for this visit  No Known Allergies    Review of Systems    Video Exam    There were no vitals filed for this visit  Physical Exam     I spent 50 minutes directly with the patient during this visit    VIRTUAL VISIT DISCLAIMER    Jesus Alberto Oscar verbally agrees to participate in [employer]   Pt is aware that [employer] could be limited without vital signs or the ability to perform a full hands-on physical Rachel Fuentes understands she or the provider may request at any time to terminate the video visit and request the patient to seek care or treatment in person

## 2022-06-07 NOTE — BH TREATMENT PLAN
France Clements  1995       Date of Initial Treatment Plan:   Date of Current Treatment Plan: 06/07/22    Treatment Plan Number 1     Strengths/Personal Resources for Self Care: compassionate, strong, resilient, intelligent  Self care - distraction, watching tv, game on phone, walking with  and dog, relaxing  Diagnosis:   1  PTSD (post-traumatic stress disorder)         Area of Needs: emotional regulation, feel better      Long Term Goal 1: AI want to feel better     Target Date: 6/7/2023  Completion Date: to be determined         Short Term Objectives for Goal 1: Client will learn DBT concepts and skills including wise mind, core mindfulness, lovingkindness, middle path",DANTE, GIVE FAST, building/ending relationships, behavior chain analysis, opposite action, understanding and naming emotions, Pros and cons, PLEASE skills, TIP skills, Radical acceptance, ACCEPTS, Turning the Mind and IMPROVE the moment  Client will practice skills and assess effectiveness with therapist biweekly and adjust skills acquisition as needed  Client will demonstrate successful use of DBT skills in at least 8 out of 10 challenging situations  Long Term Goal 2: N/A    Target Date: N/A  Completion Date: N/A    Short Term Objectives for Goal 2: N/A         Long Term Goal # 3: N/A     Target Date: N/A  Completion Date: N/A    Short Term Objectives for Goal 3: N/A    GOAL 1: Modality: Individual 2x per month   Completion Date to be determined    GOAL 2: Modality:     GOAL 3: Modality:       Behavioral Health Treatment Plan [facility]: Diagnosis and Treatment Plan explained to Damonronnie Araujo relates understanding diagnosis and is agreeable to Treatment Plan         Client Comments : Please share your thoughts, feelings, need and/or experiences regarding your treatment plan: none noted  France Clements, 1995, actively participated in the creation of this treatment plan during a virtual session, using the [facility]  Gilbertogalileo Sims  provided verbal consent on 6/7/2022 at 10:52 AM  The treatment plan was transcribed into the [facility] Record at a later time

## 2022-06-27 ENCOUNTER — OFFICE VISIT (OUTPATIENT)
Dept: URGENT CARE | Age: 27
End: 2022-06-27
Payer: COMMERCIAL

## 2022-06-27 VITALS
RESPIRATION RATE: 16 BRPM | OXYGEN SATURATION: 99 % | DIASTOLIC BLOOD PRESSURE: 82 MMHG | TEMPERATURE: 99.1 F | HEART RATE: 106 BPM | SYSTOLIC BLOOD PRESSURE: 128 MMHG

## 2022-06-27 DIAGNOSIS — J02.9 SORE THROAT: Primary | ICD-10-CM

## 2022-06-27 LAB
S PYO AG THROAT QL: NEGATIVE
SARS-COV-2 AG UPPER RESP QL IA: NEGATIVE
VALID CONTROL: NORMAL

## 2022-06-27 PROCEDURE — 87070 CULTURE OTHR SPECIMN AEROBIC: CPT | Performed by: NURSE PRACTITIONER

## 2022-06-27 PROCEDURE — 99213 OFFICE O/P EST LOW 20 MIN: CPT | Performed by: NURSE PRACTITIONER

## 2022-06-27 PROCEDURE — 87811 SARS-COV-2 COVID19 W/OPTIC: CPT | Performed by: NURSE PRACTITIONER

## 2022-06-27 PROCEDURE — 87147 CULTURE TYPE IMMUNOLOGIC: CPT | Performed by: NURSE PRACTITIONER

## 2022-06-27 NOTE — PROGRESS NOTES
NAME: Gail Jacques is a 32 y o  female  : 1995    MRN: [id number]    /82   Pulse (!) 106   Temp 99 1 °F (37 3 °C)   Resp 16   LMP 2022   SpO2 99%   Breastfeeding Unknown     Assessment and Plan   Sore throat [J02 9]  1  Sore throat  Poct Covid 19 Rapid Antigen Test    POCT rapid strepA    Throat culture       Jaclyn Mccray was seen today for sore throat  Diagnoses and all orders for this visit:    Sore throat  -     Poct Covid 19 Rapid Antigen Test  -     POCT rapid strepA  -     Throat culture    rapid covid was negative  Rapid strep test was negative, sent for a culture     Patient Instructions   Patient Instructions   Follow up with PCP  Take zyrtec or allegra daily  Use flonase 1-2 sprays in each nare daily   Use nasal saline to the nose,   Use humidifer in room  Symptoms worsen go to ER  Rest    No bacterial infection present  Rapid strep was negative and sent for culture  Rapid covid test today  Proceed to the nearest ER if symptoms worsen, Follow up with your PCP  Continue to social distance, wash your hands, and wear your masks  Please continue to follow the CDC  gov guidelines daily for they are subject to change on COVID-19    Chief Complaint     Chief Complaint   Patient presents with    Sore Throat     Sore throat, headache, fevers since yesterday  Temperature of 100 4  Dry cough  Covid vaccinated  History of Present Illness     33 yo F here today for sore throat, HA, and fever since yesterday  She had a temp of 100 4 and dry cough  Currently Covid-19 vaccinated  Saturday was at [address] [place] [address] and screaming and  worse  She developed cough, fever, chills, sore throat and fatigue  No home Covid 19 test at home  Cough dry in nature, pressure in both ears, and never been dx with COVID 19  Review of Systems   Review of Systems   Constitutional: Positive for chills, fatigue and fever     HENT: Positive for congestion, ear pain (pressure), postnasal drip and sore throat  Negative for rhinorrhea  Eyes: Negative  Respiratory: Positive for cough  Cardiovascular: Negative  Gastrointestinal: Negative  Genitourinary: Negative  Musculoskeletal: Negative  Neurological: Positive for headaches  Psychiatric/Behavioral: Negative            Current Medications       Current Outpatient Medications:     Prenatal MV-Min-FA-Omega-3 (PRENATAL GUMMIES/DHA & FA PO), Take by mouth, Disp: , Rfl:     acetaminophen (TYLENOL) 325 mg tablet, Take 3 tablets (975 mg total) by mouth every 6 (six) hours (Patient not taking: Reported on 4/18/2022 ), Disp: , Rfl: 0    albuterol (2 5 mg/3 mL) 0 083 % nebulizer solution, Take 3 mL (2 5 mg total) by nebulization every 6 (six) hours as needed for wheezing or shortness of breath (Patient not taking: Reported on 6/27/2022), Disp: 90 mL, Rfl: 0    azelastine (ASTELIN) 0 1 % nasal spray, 1 spray into each nostril 2 (two) times a day Use in each nostril as directed (Patient not taking: Reported on 6/27/2022), Disp: 1 mL, Rfl: 0    fluticasone (FLONASE) 50 mcg/act nasal spray, 2 sprays into each nostril daily, Disp: 9 9 mL, Rfl: 0    ibuprofen (MOTRIN) 600 mg tablet, Take 1 tablet (600 mg total) by mouth every 6 (six) hours as needed for moderate pain (Patient not taking: Reported on 4/18/2022 ), Disp: 30 tablet, Rfl: 0    levocetirizine (XYZAL) 5 MG tablet, Take 1 tablet (5 mg total) by mouth every evening (Patient not taking: Reported on 6/27/2022), Disp: 30 tablet, Rfl: 0    oxyCODONE (ROXICODONE) 5 immediate release tablet, Take 1 tablet (5 mg total) by mouth every 4 (four) hours as needed for severe pain for up to 7 doses Max Daily Amount: 30 mg (Patient not taking: Reported on 3/16/2022 ), Disp: 7 tablet, Rfl: 0    Current Allergies     Allergies as of 06/27/2022    (No Known Allergies)              Past Medical History:   Diagnosis Date    Allergic rhinitis     Asthma     Endometriosis     POTS (postural orthostatic tachycardia syndrome)        Past Surgical History:   Procedure Laterality Date    DILATION AND CURETTAGE OF UTERUS N/A 3/8/2022    Procedure: DILATATION AND CURETTAGE (D&C); Surgeon: Ankita Ding MD;  [facility];  Service: Gynecology    ENDOMETRIAL ABLATION      LAPAROSCOPIC ENDOMETRIOSIS FULGURATION  03/03/2021    [place] LAP,DIAGNOSTIC ABDOMEN N/A 3/8/2022    Procedure: LAPAROSCOPY DIAGNOSTIC, right ovarian cystectomy;  Surgeon: Ankita Ding MD;  [facility];  Service: Gynecology       Family History   Family history unknown: Yes         Medications have been verified  The following portions of the patient's history were reviewed and updated as appropriate: allergies, current medications, past family history, past medical history, past social history, past surgical history and problem list     Objective   /82   Pulse (!) 106   Temp 99 1 °F (37 3 °C)   Resp 16   LMP 06/06/2022   SpO2 99%   Breastfeeding Unknown      Physical Exam     Physical Exam  Constitutional:       Appearance: She is well-developed  HENT:      Head: Normocephalic  Right Ear: Hearing, tympanic membrane, ear canal and external ear normal       Left Ear: Hearing, tympanic membrane, ear canal and external ear normal       Nose: No nasal tenderness, mucosal edema, congestion or rhinorrhea  Right Turbinates: Not enlarged, swollen or pale  Left Turbinates: Not enlarged, swollen or pale  Right Sinus: No maxillary sinus tenderness or frontal sinus tenderness  Left Sinus: No maxillary sinus tenderness or frontal sinus tenderness  Mouth/Throat:      Lips: Pink  Mouth: Mucous membranes are moist       Palate: No lesions  Pharynx: Uvula midline  No posterior oropharyngeal erythema  Tonsils: No tonsillar exudate  0 on the right  0 on the left  Eyes:      Conjunctiva/sclera: Conjunctivae normal       Pupils: Pupils are equal, round, and reactive to light     Neck: Thyroid: No thyroid mass  Cardiovascular:      Rate and Rhythm: Normal rate and regular rhythm  Heart sounds: Normal heart sounds  Pulmonary:      Effort: Pulmonary effort is normal       Breath sounds: Normal breath sounds  Abdominal:      General: Abdomen is flat  Palpations: Abdomen is soft  Musculoskeletal:         General: Normal range of motion  Cervical back: Normal range of motion  No erythema  Skin:     General: Skin is warm and dry  Neurological:      General: No focal deficit present  Mental Status: She is alert and oriented to person, place, and time  Psychiatric:         Mood and Affect: Mood normal          Behavior: Behavior normal  Behavior is cooperative  Note: Portions of this record may have been created with voice recognition software  Occasional wrong word or "sound a like" substitutions may have occurred due to the inherent limitations of voice recognition software  Please read the chart carefully and recognize, using context, where substitutions have occurred  ABIMBOLA Rush

## 2022-06-27 NOTE — PATIENT INSTRUCTIONS
Follow up with PCP  Take zyrtec or allegra daily  Use flonase 1-2 sprays in each nare daily   Use nasal saline to the nose,   Use humidifer in room  Symptoms worsen go to ER  Rest    No bacterial infection present  Rapid strep was negative and sent for culture  Rapid covid test today

## 2022-06-27 NOTE — LETTER
June 27, 2022     Patient: Felicia Lubin  YOB: 1995  Date of Visit: 6/27/2022      To Whom it May Concern:    Felicia Lubin is under my professional care  Shemar Rangel was seen in my office on 6/27/2022   Shemar Rangel may return to work on 06/29/2022 if symptoms improve if not please excuse till next working day       Sincerely,          ABIMBOLA Remy Caro        CC: No Recipients

## 2022-07-01 DIAGNOSIS — J02.0 STREP PHARYNGITIS: Primary | ICD-10-CM

## 2022-07-01 LAB — BACTERIA THROAT CULT: ABNORMAL

## 2022-07-01 RX ORDER — PENICILLIN V POTASSIUM 250 MG/1
250 TABLET ORAL 2 TIMES DAILY
Qty: 20 TABLET | Refills: 0 | Status: SHIPPED | OUTPATIENT
Start: 2022-07-01 | End: 2022-07-11

## 2022-07-01 NOTE — PROGRESS NOTES
Patient called regarding positive strep culture  She states she is still symptomatic    Will order Pen V

## 2022-07-04 ENCOUNTER — OFFICE VISIT (OUTPATIENT)
Dept: URGENT CARE | Age: 27
End: 2022-07-04
Payer: COMMERCIAL

## 2022-07-04 VITALS
RESPIRATION RATE: 16 BRPM | SYSTOLIC BLOOD PRESSURE: 120 MMHG | HEART RATE: 66 BPM | TEMPERATURE: 97.6 F | DIASTOLIC BLOOD PRESSURE: 84 MMHG | OXYGEN SATURATION: 99 %

## 2022-07-04 DIAGNOSIS — R05.9 COUGH: Primary | ICD-10-CM

## 2022-07-04 PROCEDURE — 99213 OFFICE O/P EST LOW 20 MIN: CPT

## 2022-07-04 RX ORDER — PREDNISONE 20 MG/1
40 TABLET ORAL DAILY
Qty: 8 TABLET | Refills: 0 | Status: SHIPPED | OUTPATIENT
Start: 2022-07-04 | End: 2022-07-08

## 2022-07-04 RX ORDER — ALBUTEROL SULFATE 90 UG/1
2 AEROSOL, METERED RESPIRATORY (INHALATION) EVERY 6 HOURS PRN
Qty: 8.5 G | Refills: 0 | Status: SHIPPED | OUTPATIENT
Start: 2022-07-04 | End: 2022-07-08

## 2022-07-04 NOTE — PROGRESS NOTES
[facility]        NAME: Minna Dobbs is a 32 y o  female  : 1995    MRN: [id number]  DATE: 2022  TIME: 3:38 PM    Assessment and Plan   Cough [R05 9]  1  Cough  predniSONE 20 mg tablet    albuterol (ProAir HFA) 90 mcg/act inhaler    ipratropium (ATROVENT) 0 02 % nebulizer solution         Patient Instructions     Start steroid burst for acute cough in the s/o asthma  Albuterol refilled  Nebs as needed  Recommend Mucinex OTC  Continue PenV as previously prescribed  Follow up with PCP in 2-3 days  Proceed to ER if symptoms worsen  Chief Complaint     Chief Complaint   Patient presents with    Sore Throat     Strep came back positive  Currently on day 4 on antibiotics  Coughing much more  States albuterol and OTC medicine is not helping  States lungs feel heavy  History of Present Illness     32 y o  F presents with complaints of cough and chest tightness  Seen here on  for sore throat - COVID negative, throat culture positive  Started PenV  PMH asthma, using Albuterol PRN and ran out  States she just doesn't feel like she's getting better  Denies CP or SOB  Denies N/V/D, fevers or chills  Taking Dayquil OTC  Denies tobacco or drug use  Review of Systems   Review of Systems   Constitutional: Negative for chills, fatigue and fever  HENT: Negative for congestion, ear pain, facial swelling, hearing loss, rhinorrhea, sinus pressure, sneezing, sore throat and trouble swallowing  Eyes: Negative for pain, redness and visual disturbance  Respiratory: Positive for cough and chest tightness  Negative for shortness of breath and wheezing  Cardiovascular: Negative for chest pain and palpitations  Gastrointestinal: Negative for abdominal pain, diarrhea, nausea and vomiting  Genitourinary: Negative for dysuria, flank pain, hematuria and pelvic pain  Musculoskeletal: Negative for arthralgias, back pain and myalgias  Skin: Negative for color change and rash  Neurological: Negative for dizziness, seizures, syncope, weakness, light-headedness, numbness and headaches  Psychiatric/Behavioral: Negative for confusion, hallucinations and sleep disturbance  The patient is not nervous/anxious  All other systems reviewed and are negative          Current Medications       Current Outpatient Medications:     albuterol (ProAir HFA) 90 mcg/act inhaler, Inhale 2 puffs every 6 (six) hours as needed for wheezing or shortness of breath, Disp: 8 5 g, Rfl: 0    ipratropium (ATROVENT) 0 02 % nebulizer solution, Take 2 5 mL (0 5 mg total) by nebulization every 6 (six) hours as needed for wheezing or shortness of breath, Disp: 2 5 mL, Rfl: 0    penicillin V potassium (VEETID) 250 mg tablet, Take 1 tablet (250 mg total) by mouth 2 (two) times a day for 10 days, Disp: 20 tablet, Rfl: 0    predniSONE 20 mg tablet, Take 2 tablets (40 mg total) by mouth daily for 4 days, Disp: 8 tablet, Rfl: 0    Prenatal MV-Min-FA-Omega-3 (PRENATAL GUMMIES/DHA & FA PO), Take by mouth, Disp: , Rfl:     acetaminophen (TYLENOL) 325 mg tablet, Take 3 tablets (975 mg total) by mouth every 6 (six) hours (Patient not taking: Reported on 4/18/2022 ), Disp: , Rfl: 0    albuterol (2 5 mg/3 mL) 0 083 % nebulizer solution, Take 3 mL (2 5 mg total) by nebulization every 6 (six) hours as needed for wheezing or shortness of breath (Patient not taking: Reported on 6/27/2022), Disp: 90 mL, Rfl: 0    azelastine (ASTELIN) 0 1 % nasal spray, 1 spray into each nostril 2 (two) times a day Use in each nostril as directed (Patient not taking: Reported on 6/27/2022), Disp: 1 mL, Rfl: 0    fluticasone (FLONASE) 50 mcg/act nasal spray, 2 sprays into each nostril daily, Disp: 9 9 mL, Rfl: 0    ibuprofen (MOTRIN) 600 mg tablet, Take 1 tablet (600 mg total) by mouth every 6 (six) hours as needed for moderate pain (Patient not taking: Reported on 4/18/2022 ), Disp: 30 tablet, Rfl: 0    levocetirizine (XYZAL) 5 MG tablet, Take 1 tablet (5 mg total) by mouth every evening (Patient not taking: Reported on 6/27/2022), Disp: 30 tablet, Rfl: 0    oxyCODONE (ROXICODONE) 5 immediate release tablet, Take 1 tablet (5 mg total) by mouth every 4 (four) hours as needed for severe pain for up to 7 doses Max Daily Amount: 30 mg (Patient not taking: Reported on 3/16/2022 ), Disp: 7 tablet, Rfl: 0    Current Allergies     Allergies as of 07/04/2022    (No Known Allergies)            The following portions of the patient's history were reviewed and updated as appropriate: allergies, current medications, past family history, past medical history, past social history, past surgical history and problem list      Past Medical History:   Diagnosis Date    Allergic rhinitis     Asthma     Endometriosis     POTS (postural orthostatic tachycardia syndrome)        Past Surgical History:   Procedure Laterality Date    DILATION AND CURETTAGE OF UTERUS N/A 3/8/2022    Procedure: DILATATION AND CURETTAGE (D&C); Surgeon: Pam Olivia MD;  [facility];  Service: Gynecology    ENDOMETRIAL ABLATION      LAPAROSCOPIC ENDOMETRIOSIS FULGURATION  03/03/2021    IN LAP,DIAGNOSTIC ABDOMEN N/A 3/8/2022    Procedure: LAPAROSCOPY DIAGNOSTIC, right ovarian cystectomy;  Surgeon: Pam Olivia MD;  [facility];  Service: Gynecology       Family History   Family history unknown: Yes         Medications have been verified  Objective   /84   Pulse 66   Temp 97 6 °F (36 4 °C)   Resp 16   LMP 06/06/2022   SpO2 99%   Patient's last menstrual period was 06/06/2022  Physical Exam     Physical Exam  Vitals reviewed  Constitutional:       General: She is not in acute distress  Appearance: Normal appearance  She is obese  She is not toxic-appearing  HENT:      Head: Normocephalic  Right Ear: Tympanic membrane normal  No middle ear effusion  Tympanic membrane is not erythematous or bulging        Left Ear: Tympanic membrane normal   No middle ear effusion  Tympanic membrane is not erythematous or bulging  Nose: No congestion or rhinorrhea  Right Sinus: No maxillary sinus tenderness or frontal sinus tenderness  Left Sinus: No maxillary sinus tenderness or frontal sinus tenderness  Mouth/Throat:      Pharynx: Oropharynx is clear  Uvula midline  Posterior oropharyngeal erythema present  No oropharyngeal exudate or uvula swelling  Tonsils: No tonsillar exudate or tonsillar abscesses  Eyes:      Extraocular Movements: Extraocular movements intact  Conjunctiva/sclera: Conjunctivae normal       Pupils: Pupils are equal, round, and reactive to light  Cardiovascular:      Rate and Rhythm: Normal rate and regular rhythm  Pulses: Normal pulses  Heart sounds: Normal heart sounds  Pulmonary:      Effort: Pulmonary effort is normal  No tachypnea, accessory muscle usage or respiratory distress  Breath sounds: Normal breath sounds and air entry  No decreased air movement  No decreased breath sounds, wheezing, rhonchi or rales  Comments:   CTAB no wheezing or stridor  99% RA  No conversational dyspnea  Dry cough on exam  Abdominal:      General: Bowel sounds are normal       Palpations: Abdomen is soft  Tenderness: There is no abdominal tenderness  There is no right CVA tenderness or guarding  Musculoskeletal:         General: Normal range of motion  Cervical back: Normal range of motion  Lymphadenopathy:      Cervical: No cervical adenopathy  Skin:     General: Skin is warm and dry  Neurological:      General: No focal deficit present  Mental Status: She is alert  Cranial Nerves: Cranial nerves are intact  Sensory: Sensation is intact  Motor: Motor function is intact  Coordination: Coordination is intact  Gait: Gait is intact  Deep Tendon Reflexes: Reflexes are normal and symmetric  Psychiatric:         Behavior: Behavior is cooperative

## 2022-07-04 NOTE — PATIENT INSTRUCTIONS
Asthma   Start steroid burst for acute cough in the setting of positive strep culture and possible bronchitis with PMH of asthma  Albuterol inhaler as needed  Nebulizer treatments as needed  Continue Penicillin as directed for full course of treatment  Follow up with PCP in 2-3 days  Proceed to ER if symptoms worsen  Feel better! AMBULATORY CARE:   Asthma  is a lung disease that makes breathing difficult  Chronic inflammation and reactions to triggers narrow the airways in your lungs  Asthma can become life-threatening if it is not managed  Cough-variant asthma  is a type of asthma that causes a dry cough that keeps coming back  A dry cough may be your only symptom, or you may also have chest tightness  These symptoms may be caused by exercise or exposure to odors, allergens, or respiratory tract infections  Cough-variant asthma is treated the same way as typical asthma  Common symptoms include the following:   Coughing    Wheezing    Shortness of breath    Chest tightness    Call your local emergency number (911 in the [employer] [place]) if:   You have severe shortness of breath  The skin around your neck and ribs pulls in with each breath  Your peak flow numbers are in the red zone of your AAP  Seek care immediately if:   You have shortness of breath, even after you take your short-term medicine as directed  Your lips or nails turn blue or gray  Call your doctor or asthma specialist if:   You run out of medicine before your next refill is due  Your symptoms get worse  You need to take more medicine than usual to control your symptoms  You have questions or concerns about your condition or care  Treatment for asthma  will depend on how severe your asthma is  Medicine may be used to decrease inflammation, open airways, and make it easier to breathe  Medicines may be inhaled, taken as a pill, or injected  Short-term medicines relieve your symptoms quickly   Long-term medicines are used to prevent future attacks  Other medicines may be needed if your regular medicines are not able to prevent attacks  You may also need medicine to help control your allergies  Manage and prevent future asthma attacks: Follow your asthma action plan  This is a written plan that you and your healthcare provider create  It explains which medicine you need and when to change doses if necessary  It also explains how you can monitor symptoms and use a peak flow meter  The meter measures how well your lungs are working  Manage other health conditions , such as allergies, acid reflux, and sleep apnea  Identify and avoid triggers  These may include pets, dust mites, mold, and cockroaches  Do not smoke or be around others who smoke  Nicotine and other chemicals in cigarettes and cigars can cause lung damage  Ask your healthcare provider for information if you currently smoke and need help to quit  E-cigarettes or smokeless tobacco still contain nicotine  Talk to your healthcare provider before you use these products  Ask about the flu vaccine  The flu can make your asthma worse  You may need a yearly flu shot  Follow up with your healthcare provider as directed: You will need to return to make sure your medicine is working and your symptoms are controlled  You may be referred to an asthma or allergy specialist  Nirav Andrew may be asked to keep a record of your peak flow values and bring it with you to your appointments  Write down your questions so you remember to ask them during your visits  © Copyright [employer] 2022 Information is for End User's use only and may not be sold, redistributed or otherwise used for commercial purposes  All illustrations and images included in CareNotes® are the copyrighted property of A D A M [employer] Inc  or Lizeth Zimmer   The above information is an  only  It is not intended as medical advice for individual conditions or treatments   Talk to your doctor, nurse or pharmacist before following any medical regimen to see if it is safe and effective for you

## 2022-07-08 ENCOUNTER — TELEPHONE (OUTPATIENT)
Dept: URGENT CARE | Age: 27
End: 2022-07-08

## 2022-07-08 DIAGNOSIS — R05.9 COUGH: Primary | ICD-10-CM

## 2022-07-08 DIAGNOSIS — R05.9 COUGH: ICD-10-CM

## 2022-07-08 RX ORDER — ALBUTEROL SULFATE 90 UG/1
2 AEROSOL, METERED RESPIRATORY (INHALATION) EVERY 6 HOURS PRN
Qty: 8.5 G | Refills: 0 | Status: SHIPPED | OUTPATIENT
Start: 2022-07-08 | End: 2022-07-08

## 2022-07-08 RX ORDER — ALBUTEROL SULFATE 90 UG/1
2 AEROSOL, METERED RESPIRATORY (INHALATION) EVERY 6 HOURS PRN
Qty: 8.5 G | Refills: 0 | Status: SHIPPED | OUTPATIENT
Start: 2022-07-08

## 2022-08-01 ENCOUNTER — APPOINTMENT (EMERGENCY)
Dept: RADIOLOGY | Facility: HOSPITAL | Age: 27
End: 2022-08-01
Payer: OTHER MISCELLANEOUS

## 2022-08-01 ENCOUNTER — HOSPITAL ENCOUNTER (EMERGENCY)
Facility: HOSPITAL | Age: 27
Discharge: HOME | End: 2022-08-01
Attending: EMERGENCY MEDICINE | Admitting: EMERGENCY MEDICINE
Payer: OTHER MISCELLANEOUS

## 2022-08-01 VITALS
OXYGEN SATURATION: 98 % | TEMPERATURE: 98.4 F | HEART RATE: 86 BPM | RESPIRATION RATE: 16 BRPM | DIASTOLIC BLOOD PRESSURE: 86 MMHG | SYSTOLIC BLOOD PRESSURE: 133 MMHG

## 2022-08-01 DIAGNOSIS — S60.022A CONTUSION OF LEFT INDEX FINGER: Primary | ICD-10-CM

## 2022-08-01 PROCEDURE — 99283 EMERGENCY DEPT VISIT LOW MDM: CPT

## 2022-08-01 PROCEDURE — 99284 EMERGENCY DEPT VISIT MOD MDM: CPT | Performed by: PHYSICIAN ASSISTANT

## 2022-08-01 PROCEDURE — 73140 X-RAY EXAM OF FINGER(S): CPT

## 2022-08-01 RX ORDER — NAPROXEN 250 MG/1
500 TABLET ORAL ONCE
Status: COMPLETED | OUTPATIENT
Start: 2022-08-01 | End: 2022-08-01

## 2022-08-01 RX ADMIN — NAPROXEN 500 MG: 250 TABLET ORAL at 11:44

## 2022-08-01 NOTE — Clinical Note
Lacy Cha was seen and treated in our emergency department on 8/1/2022  No restrictions            Diagnosis:     Marisol Mejia    She may return on this date: 08/02/2022         If you have any questions or concerns, please don't hesitate to call        Papo Truong PA-C    ______________________________           _______________          _______________  Hospital Representative                              Date                                Time

## 2022-08-01 NOTE — DISCHARGE INSTRUCTIONS
Please refer to the attached information for strict return instructions  If symptoms worsen or new symptoms develop please return to the ER   Please follow up with your primary care physician if symptoms persist

## 2022-09-12 ENCOUNTER — TELEPHONE (OUTPATIENT)
Dept: PSYCHIATRY | Facility: CLINIC | Age: 27
End: 2022-09-12

## 2022-09-12 NOTE — TELEPHONE ENCOUNTER
NO-SHOW LETTER MAILED TO Ivette Youngblood    ADDRESS: [address] [place] [address]    Interested Letter

## 2022-09-22 ENCOUNTER — OFFICE VISIT (OUTPATIENT)
Dept: OBGYN CLINIC | Facility: CLINIC | Age: 27
End: 2022-09-22
Payer: COMMERCIAL

## 2022-09-22 VITALS
WEIGHT: 230.4 LBS | DIASTOLIC BLOOD PRESSURE: 62 MMHG | HEIGHT: 67 IN | SYSTOLIC BLOOD PRESSURE: 104 MMHG | BODY MASS INDEX: 36.16 KG/M2

## 2022-09-22 DIAGNOSIS — Z31.41 FERTILITY TESTING: Primary | ICD-10-CM

## 2022-09-22 DIAGNOSIS — Z87.42 HISTORY OF ENDOMETRIOSIS: ICD-10-CM

## 2022-09-22 PROCEDURE — 99214 OFFICE O/P EST MOD 30 MIN: CPT | Performed by: NURSE PRACTITIONER

## 2022-09-22 NOTE — PROGRESS NOTES
Assessment/Plan:    1  Fertility testing    - Follicle stimulating hormone; Future  - Estradiol; Future  - DHEA-sulfate; Future  - Luteinizing hormone; Future  - Prolactin; Future  - Testosterone, free, total; Future  - TSH, 3rd generation with Free T4 reflex; Future  - Antimullerian hormone (AMH); Future  - Progesterone; Future    2  History of endometriosis    In 31 yo   Her recent pregnancy in 3/2022 in my opinion was likely chemical and not likely ectopic  In the context of her hx of endometriosis and six months of trying to conceive, recommend initiating a work up while she continues to time relations  Advised her to use an ovulation predictor kit  Will check hormonal panel and semen analysis to start  HSG if indicated  PRN referral to RE&I      I have spent 30 minutes with Patient  today in which greater than 50% of this time was spent in counseling/coordination of care regarding Diagnostic results, Intructions for management and Impressions  Subjective:      Patient ID: Manjeet Baca is a 32 y o  female  HPI  PROBLEM VISIT  CC: establish care/ fertililty    31 yo  presents to establish care and to discuss fertility  She was seen last at [facility]  Patient reports than on 22 she had her Mirena IUD removed in order to conceive  She conceived right away, having a positive urine pregnancy test at the end of / early February  She experienced bleeding and cramping and sought care at the ED on 22  Quant HCG at ED was 47  Serial quants were followed at [facility]:   HCG = 21   HCG = 12   HCG =16   HCG =25  3/8 HCG = 17    3/7/22 pelvic US: Visualized a 4 4 cm right adnexal mass on US    3/8/2022 D&C, R ovarian cystectomy; suspected ectopic    Post op findings:  Right ovarian hemorraghic cyst  Normal appearing right fallopian tube  Normal appearing left fallopian tube and ovary  Small amount of hemoperitoneum in the posterior cul de sac    Pathology: right ovarian cyst wall/ corpus luteum; em tissue negative; rt ov cyst fluid bloody  3/11 HCG = negative    Since surgery her past 7 cycles have been regular, 26d, x5d  Has been timing relations every other day during fertile time of her past six cycles and has not conceived     3/2021, Lap fulguration of endometriosis    Menarche 14; cycles historically regular, but heavy  Had a Mirena inserted 2017 for heavy painful periods   has never fathered a pregnancy  STD hx negative    The following portions of the patient's history were reviewed and updated as appropriate: allergies, current medications, past family history, past medical history, past social history, past surgical history and problem list     Review of Systems   Constitutional: Negative for chills and fever  Respiratory: Negative for cough and shortness of breath  Genitourinary: Negative for dyspareunia, dysuria, frequency, genital sores, hematuria, menstrual problem, pelvic pain, urgency, vaginal bleeding, vaginal discharge and vaginal pain  Musculoskeletal: Negative for arthralgias and myalgias  Objective:    /62 (BP Location: Left arm, Patient Position: Sitting, Cuff Size: Adult)   Ht 5' 7" (1 702 m)   Wt 105 kg (230 lb 6 4 oz)   LMP 09/13/2022 (Exact Date)   BMI 36 09 kg/m²      Physical Exam  Constitutional:       General: She is not in acute distress  Appearance: Normal appearance  She is obese  She is not ill-appearing or diaphoretic  HENT:      Head: Normocephalic and atraumatic  Eyes:      Pupils: Pupils are equal, round, and reactive to light  Pulmonary:      Effort: Pulmonary effort is normal    Skin:     General: Skin is warm and dry  Neurological:      General: No focal deficit present  Mental Status: She is alert and oriented to person, place, and time  Psychiatric:         Mood and Affect: Mood normal          Behavior: Behavior normal          Thought Content:  Thought content normal  Judgment: Judgment normal

## 2022-09-22 NOTE — LETTER
September 22, 2022     Patient: Nirav Pena  YOB: 1995  Date of Visit: 9/22/2022      To Whom it May Concern:    Nirav Pena is under my professional care  Geraldo Wilkinson was seen in my office on 9/22/2022  She is actively trying to conceive and should not receive the MMR vaccine at this time  If you have any questions or concerns, please don't hesitate to call           Sincerely,          ABIMBOLA Watson        CC: No Recipients

## 2022-10-03 ENCOUNTER — TELEPHONE (OUTPATIENT)
Dept: PSYCHIATRY | Facility: CLINIC | Age: 27
End: 2022-10-03

## 2022-10-05 PROBLEM — R10.2 PELVIC PAIN WITH POSITIVE BETA-HUMAN CHORIONIC GONADOTROPIN (BHCG) IN FEMALE: Status: RESOLVED | Noted: 2022-03-08 | Resolved: 2022-10-05

## 2022-10-05 PROBLEM — Z98.890 S/P OVARIAN CYSTECTOMY: Status: RESOLVED | Noted: 2022-03-13 | Resolved: 2022-10-05

## 2022-10-05 PROBLEM — Z98.890 S/P D&C (STATUS POST DILATION AND CURETTAGE): Status: RESOLVED | Noted: 2022-03-13 | Resolved: 2022-10-05

## 2022-10-05 PROBLEM — Z87.42 S/P OVARIAN CYSTECTOMY: Status: RESOLVED | Noted: 2022-03-13 | Resolved: 2022-10-05

## 2022-10-05 PROBLEM — N83.201 CYST OF RIGHT OVARY: Status: RESOLVED | Noted: 2022-03-08 | Resolved: 2022-10-05

## 2022-10-05 PROBLEM — Z71.3 ENCOUNTER FOR WEIGHT LOSS COUNSELING: Status: RESOLVED | Noted: 2021-05-24 | Resolved: 2022-10-05

## 2022-10-05 PROBLEM — Z33.1 PELVIC PAIN WITH POSITIVE BETA-HUMAN CHORIONIC GONADOTROPIN (BHCG) IN FEMALE: Status: RESOLVED | Noted: 2022-03-08 | Resolved: 2022-10-05

## 2022-10-06 ENCOUNTER — OFFICE VISIT (OUTPATIENT)
Dept: FAMILY MEDICINE CLINIC | Facility: CLINIC | Age: 27
End: 2022-10-06
Payer: COMMERCIAL

## 2022-10-06 VITALS
SYSTOLIC BLOOD PRESSURE: 102 MMHG | HEIGHT: 67 IN | DIASTOLIC BLOOD PRESSURE: 76 MMHG | WEIGHT: 233 LBS | BODY MASS INDEX: 36.57 KG/M2 | HEART RATE: 72 BPM

## 2022-10-06 DIAGNOSIS — G62.9 PERIPHERAL POLYNEUROPATHY: ICD-10-CM

## 2022-10-06 DIAGNOSIS — N80.9 ENDOMETRIOSIS: ICD-10-CM

## 2022-10-06 DIAGNOSIS — G43.911 INTRACTABLE MIGRAINE WITH STATUS MIGRAINOSUS, UNSPECIFIED MIGRAINE TYPE: Primary | ICD-10-CM

## 2022-10-06 DIAGNOSIS — J45.20 MILD INTERMITTENT ASTHMA WITHOUT COMPLICATION: ICD-10-CM

## 2022-10-06 DIAGNOSIS — J30.2 SEASONAL ALLERGIC RHINITIS, UNSPECIFIED TRIGGER: ICD-10-CM

## 2022-10-06 DIAGNOSIS — G90.A POTS (POSTURAL ORTHOSTATIC TACHYCARDIA SYNDROME): ICD-10-CM

## 2022-10-06 DIAGNOSIS — F43.10 PTSD (POST-TRAUMATIC STRESS DISORDER): ICD-10-CM

## 2022-10-06 PROCEDURE — 99204 OFFICE O/P NEW MOD 45 MIN: CPT | Performed by: FAMILY MEDICINE

## 2022-10-06 RX ORDER — ALBUTEROL SULFATE 2.5 MG/3ML
2.5 SOLUTION RESPIRATORY (INHALATION) EVERY 6 HOURS PRN
Qty: 90 ML | Refills: 0
Start: 2022-10-06

## 2022-10-06 RX ORDER — ONDANSETRON 4 MG/1
4 TABLET, ORALLY DISINTEGRATING ORAL EVERY 8 HOURS SCHEDULED
Qty: 20 TABLET | Refills: 0 | Status: SHIPPED | OUTPATIENT
Start: 2022-10-06

## 2022-10-06 RX ORDER — METHYLPREDNISOLONE 4 MG/1
TABLET ORAL
Qty: 21 EACH | Refills: 0 | Status: SHIPPED | OUTPATIENT
Start: 2022-10-06 | End: 2022-10-27 | Stop reason: ALTCHOICE

## 2022-10-06 RX ORDER — BUTALBITAL, ASPIRIN, AND CAFFEINE 325; 50; 40 MG/1; MG/1; MG/1
CAPSULE ORAL
Qty: 20 CAPSULE | Refills: 0 | Status: SHIPPED | OUTPATIENT
Start: 2022-10-06

## 2022-10-06 RX ORDER — FLUTICASONE PROPIONATE 50 MCG
2 SPRAY, SUSPENSION (ML) NASAL DAILY
Qty: 9.9 ML | Refills: 0 | Status: SHIPPED | OUTPATIENT
Start: 2022-10-06 | End: 2022-11-05

## 2022-10-06 NOTE — PATIENT INSTRUCTIONS
1  Start prednisone taper  2  Start Fioricet and also Zofran for nausea - I recommending taking 1 of each and going to sleep and see how you feel  3  See me in 2 weeks    Notify me sooner if symptoms worsen or persist

## 2022-10-06 NOTE — PROGRESS NOTES
Name: Manjeet Baca      : 1995      MRN: [id number]  Encounter Provider: Amando Uriarte MD  Encounter Date: 10/6/2022   Encounter department: 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  She was diagnosed with POTS syndrome in 2018, evaluated at that time at [facility]  She manages her symptoms well with increased intake of sodium and fluids  More recently, in 2022 she was diagnosed with an ectopic pregnancy  She has a history of endometriosis and is followed by Dr Yesenia Quintero  Today, she complains chronic daily headache, migraine variety, for the last 6 days  She has a history of migraines but not unable to break for this long  She usually finds that headaches resolve with ibuprofen and sleep  She notes light sensitivity and nausea  1  Intractable migraine with status migrainosus, unspecified migraine type  Assessment & Plan:  Typical migraine variant at present with light sensitivity, nausea, sensitivity to smell  Throbbing in nature at times  Has used Excedrin which gives temporary relief  Ibuprofen is not helping at present time  Patient will be given a Medrol Dosepak  She is also given Fioricet and Zofran to take q 8 hours  I have asked her to follow-up with me in 2 weeks, but to notify me sooner if symptoms worsen or persist     Orders:  -     methylPREDNISolone 4 MG tablet therapy pack; Use as directed on package  -     ondansetron (Zofran ODT) 4 mg disintegrating tablet; Take 1 tablet (4 mg total) by mouth every 8 (eight) hours  -     butalbital-aspirin-caffeine (FIORINAL) -40 mg capsule; Take 1-2 tabs p o  Q 8 hours p r n  Severe headache    2   POTS (postural orthostatic tachycardia syndrome)  Assessment & Plan:  Patient manages her symptoms with increased intake of sodium and fluids  She will be referred to Cardiology if needed  3  Seasonal allergic rhinitis, unspecified trigger  Assessment & Plan:  Continue with fluticasone  Orders:  -     fluticasone (FLONASE) 50 mcg/act nasal spray; 2 sprays into each nostril daily  -     albuterol (2 5 mg/3 mL) 0 083 % nebulizer solution; Take 3 mL (2 5 mg total) by nebulization every 6 (six) hours as needed for wheezing or shortness of breath    4  Mild intermittent asthma without complication  Assessment & Plan:  Continue with albuterol inhaler, p r n  Karrie Nissen She has used both albuterol and Atrovent nebulized solutions in the past, during flare ups  5  BMI 35 0-35 9,adult  Assessment & Plan:  Patient to be mindful of food choices and portion control  Will discuss further at next visit and consider nutritional consult  6  PTSD (post-traumatic stress disorder)  Assessment & Plan:  Childhood trauma with complex PTSD  She is stable and doing well at present time  She continues to see a therapist on a weekly basis  7  Peripheral polyneuropathy    8  Endometriosis  Assessment & Plan:  Follow-up with gyn, Dr Cintia Mistry  F/U 2 weeks  BMI Counseling: Body mass index is 37 04 kg/m²  The BMI is above normal  Nutrition recommendations include decreasing portion sizes, encouraging healthy choices of fruits and vegetables and decreasing fast food intake  Exercise recommendations include exercising 3-5 times per week  Rationale for BMI follow-up plan is due to patient being overweight or obese  Depression Screening and Follow-up Plan: Patient was screened for depression during today's encounter  They screened negative with a PHQ-2 score of 0  Subjective      Moved to   1 1/2 years ago, with   Needs Cardiology  Pt has had a very bad headache for 6 dasys now   Had migraines / bad headaches a s akid in HS  Whole head is achy, pressure, foggy, nausea, photosensitivity, smell, throbs sometines  Excedrin helps temp; ibuprofen without releif  Not sleeping as well  Has had headaches like this    Pmhx:  1  Diagnosed with DURAN in [place] in 2018 - was seen at [facility]  Had stress test and HR went to 220 with ? 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  No tob'  Soc etoh - only on occasion      Fam Hx - See Epic chart      MEds:See chart    ALL: NKDA                Review of Systems   Constitutional:        See HPI   HENT: Negative for congestion, ear pain, mouth sores, sinus pressure and trouble swallowing  Eyes: Negative for discharge, redness and itching  Respiratory: Negative for apnea, cough, chest tightness, shortness of breath, wheezing and stridor  Cardiovascular: Negative for chest pain, palpitations and leg swelling  See HPI   Gastrointestinal: Positive for nausea  Negative for abdominal distention, abdominal pain, blood in stool, constipation, diarrhea and vomiting  Endocrine: Negative for cold intolerance and heat intolerance  Genitourinary: Negative for difficulty urinating, dysuria, flank pain and urgency  Musculoskeletal: Negative for arthralgias and myalgias  Skin: Negative for rash  Neurological: Positive for headaches  Negative for dizziness, seizures, syncope, speech difficulty, weakness, light-headedness and numbness  See HPI   Hematological: Negative for adenopathy  Psychiatric/Behavioral: Negative for agitation, behavioral problems, confusion and sleep disturbance   The patient is not nervous/anxious  See HPI       Current Outpatient Medications on File Prior to Visit   Medication Sig    albuterol (ProAir HFA) 90 mcg/act inhaler Inhale 2 puffs every 6 (six) hours as needed for wheezing    Prenatal MV-Min-FA-Omega-3 (PRENATAL GUMMIES/DHA & FA PO) Take by mouth    ipratropium (ATROVENT) 0 02 % nebulizer solution Take 2 5 mL (0 5 mg total) by nebulization every 6 (six) hours as needed for wheezing or shortness of breath       Objective     /76   Pulse 72   Ht 5' 6 5" (1 689 m)   Wt 106 kg (233 lb)   LMP 09/13/2022 (Exact Date)   BMI 37 04 kg/m²     Physical Exam  Vitals and nursing note reviewed  Constitutional:       General: She is not in acute distress  Appearance: Normal appearance  She is normal weight  She is not ill-appearing, toxic-appearing or diaphoretic  Comments: Well nourished, well developed in no apparent distress  Excellent historian, well groomed  Engaged in conversation, pleasant and cooperative  Eyes:      General: No scleral icterus  Extraocular Movements: Extraocular movements intact  Conjunctiva/sclera: Conjunctivae normal       Pupils: Pupils are equal, round, and reactive to light  Neck:      Vascular: No carotid bruit  Cardiovascular:      Rate and Rhythm: Normal rate and regular rhythm  Pulses: Normal pulses  Heart sounds: Normal heart sounds  No murmur heard  No friction rub  No gallop  Pulmonary:      Effort: Pulmonary effort is normal  No respiratory distress  Breath sounds: Normal breath sounds  No stridor  No wheezing, rhonchi or rales  Chest:      Chest wall: No tenderness  Abdominal:      General: Abdomen is flat  Bowel sounds are normal  There is no distension  Palpations: Abdomen is soft  There is no mass  Tenderness: There is no abdominal tenderness  There is no right CVA tenderness, left CVA tenderness, guarding or rebound  Hernia: No hernia is present     Musculoskeletal: Cervical back: Normal range of motion and neck supple  No rigidity or tenderness  Right lower leg: No edema  Left lower leg: No edema  Comments: Motor and sensory grossly intact  Lymphadenopathy:      Cervical: No cervical adenopathy  Skin:     Coloration: Skin is not jaundiced  Neurological:      General: No focal deficit present  Mental Status: She is alert  Gait: Gait normal    Psychiatric:         Mood and Affect: Mood normal          Behavior: Behavior normal          Thought Content: Thought content normal          Judgment: Judgment normal       Comments: Good eye contact, normal affect         Derek Garcia MD

## 2022-10-08 PROBLEM — G43.911 INTRACTABLE MIGRAINE WITH STATUS MIGRAINOSUS: Status: ACTIVE | Noted: 2022-10-08

## 2022-10-08 PROBLEM — G90.A POTS (POSTURAL ORTHOSTATIC TACHYCARDIA SYNDROME): Status: ACTIVE | Noted: 2022-10-08

## 2022-10-08 PROBLEM — G62.9 PERIPHERAL POLYNEUROPATHY: Status: ACTIVE | Noted: 2022-10-08

## 2022-10-08 PROBLEM — N80.9 ENDOMETRIOSIS: Status: ACTIVE | Noted: 2022-10-08

## 2022-10-09 NOTE — ASSESSMENT & PLAN NOTE
Patient manages her symptoms with increased intake of sodium and fluids  She will be referred to Cardiology if needed

## 2022-10-09 NOTE — ASSESSMENT & PLAN NOTE
Patient to be mindful of food choices and portion control  Will discuss further at next visit and consider nutritional consult

## 2022-10-09 NOTE — ASSESSMENT & PLAN NOTE
Continue with albuterol inhaler, yovana Abbott She has used both albuterol and Atrovent nebulized solutions in the past, during flare ups

## 2022-10-09 NOTE — ASSESSMENT & PLAN NOTE
Typical migraine variant at present with light sensitivity, nausea, sensitivity to smell  Throbbing in nature at times  Has used Excedrin which gives temporary relief  Ibuprofen is not helping at present time  Patient will be given a Medrol Dosepak  She is also given Fioricet and Zofran to take q 8 hours    I have asked her to follow-up with me in 2 weeks, but to notify me sooner if symptoms worsen or persist

## 2022-10-09 NOTE — ASSESSMENT & PLAN NOTE
Childhood trauma with complex PTSD  She is stable and doing well at present time  She continues to see a therapist on a weekly basis

## 2022-10-27 ENCOUNTER — OFFICE VISIT (OUTPATIENT)
Dept: FAMILY MEDICINE CLINIC | Facility: CLINIC | Age: 27
End: 2022-10-27

## 2022-10-27 VITALS
HEIGHT: 67 IN | SYSTOLIC BLOOD PRESSURE: 108 MMHG | BODY MASS INDEX: 36.88 KG/M2 | WEIGHT: 235 LBS | DIASTOLIC BLOOD PRESSURE: 80 MMHG | TEMPERATURE: 97.7 F | HEART RATE: 102 BPM

## 2022-10-27 DIAGNOSIS — G90.A POTS (POSTURAL ORTHOSTATIC TACHYCARDIA SYNDROME): ICD-10-CM

## 2022-10-27 DIAGNOSIS — G43.911 INTRACTABLE MIGRAINE WITH STATUS MIGRAINOSUS, UNSPECIFIED MIGRAINE TYPE: Primary | ICD-10-CM

## 2022-10-27 DIAGNOSIS — M62.838 NECK MUSCLE SPASM: ICD-10-CM

## 2022-10-27 RX ORDER — HYDROXYZINE PAMOATE 25 MG/1
CAPSULE ORAL
COMMUNITY
Start: 2022-10-27

## 2022-10-27 RX ORDER — TIZANIDINE 4 MG/1
4 TABLET ORAL
Qty: 30 TABLET | Refills: 0 | Status: SHIPPED | OUTPATIENT
Start: 2022-10-27

## 2022-10-27 RX ORDER — SERTRALINE HYDROCHLORIDE 25 MG/1
TABLET, FILM COATED ORAL
COMMUNITY
Start: 2022-10-27

## 2022-10-27 RX ORDER — SUMATRIPTAN 100 MG/1
100 TABLET, FILM COATED ORAL ONCE AS NEEDED
Qty: 9 TABLET | Refills: 0 | Status: SHIPPED | OUTPATIENT
Start: 2022-10-27

## 2022-10-27 NOTE — PATIENT INSTRUCTIONS
Assessment/plan:   Migraine type headache -these come every 2-3 months  Would recommend using Imitrex 100 mg as necessary  Prescription given to patient as  well as side effects and appropriate use  2   Chronic daily headaches - patient has these most days of the month  Likely secondary to tension and anxiety  She may see some benefit with Zoloft which she is going to be trying per Psychiatry  I would also like to give her a prescription for tizanidine 4 mg to be used in the evening to see if this helps with some of her neck spasm and tightness  Would recommend referral to neurology for evaluation as well  Patient will have labs including Lyme titer, CBC, CMP, thyroid, sed rate, and hormone levels per her   Gynecologist   3    Fertility difficulties-patient does have hormone levels to be assessed by her gynecologist  She is taking a month to stop trying and see how she does with medication therapy on Zoloft 1st   Would recommend stopping tizanidine if she becomes pregnant

## 2022-10-27 NOTE — PROGRESS NOTES
Name: Nadira Canales      : 1995      MRN: [id number]  Encounter Provider: Aubrey Santoro PA-C  Encounter Date: 10/27/2022   Encounter department: [address] [place] [address] PRIMARY CARE    Assessment & Plan     Patient Instructions     Assessment/plan:   Migraine type headache -these come every 2-3 months  Would recommend using Imitrex 100 mg as necessary  Prescription given to patient as  well as side effects and appropriate use  2   Chronic daily headaches - patient has these most days of the month  Likely secondary to tension and anxiety  She may see some benefit with Zoloft which she is going to be trying per Psychiatry  I would also like to give her a prescription for tizanidine 4 mg to be used in the evening to see if this helps with some of her neck spasm and tightness  Would recommend referral to neurology for evaluation as well  Patient will have labs including Lyme titer, CBC, CMP, thyroid, sed rate, and hormone levels per her   Gynecologist   3    Fertility difficulties-patient does have hormone levels to be assessed by her gynecologist  She is taking a month to stop trying and see how she does with medication therapy on Zoloft 1st   Would recommend stopping tizanidine if she becomes pregnant  1  Intractable migraine with status migrainosus, unspecified migraine type  -     Ambulatory Referral to Neurology; Future  -     Lyme Total Antibody Profile with reflex to WB  -     Sedimentation rate, automated  -     Comprehensive metabolic panel  -     CBC and differential  -     SUMAtriptan (Imitrex) 100 mg tablet; Take 1 tablet (100 mg total) by mouth once as needed for migraine for up to 1 dose    2  POTS (postural orthostatic tachycardia syndrome)  -     Ambulatory Referral to Neurology; Future    3  Neck muscle spasm  -     tiZANidine (ZANAFLEX) 4 mg tablet;  Take 1 tablet (4 mg total) by mouth daily at bedtime  -     Lyme Total Antibody Profile with reflex to WB  -     Sedimentation rate, automated  -     Comprehensive metabolic panel  -     CBC and differential         Subjective        HPI:  This is a 40-year-old female who presents to the office for follow-up of migraine headaches  She was seen in the office as a new patient 3 weeks ago and started on prednisone taper and Fioricet for the migraines  She does not find much benefit with the Fioricet however the prednisone did seem to help a little bit within the 1st 48 hours but did not completely resolve the headache  She does have some chronic daily tension headaches that she has been getting for quite some time that seem to be mostly in the occipital area  She feels that there is a lot of tension in her neck  She is currently working with psychiatrist with anxiety and tension symptoms and will be starting Zoloft therapy  Her actual migraine type headache she typically gets 1 every 2-3 months  Does typically cause some photophobia or phonophobia  She has never tried medications like Imitrex previously for them  She is also in the middle of getting some fertility testing as she was having some difficulty getting pregnant and is taking some time off from actively trying to get pregnant as she will be starting Zoloft with her psychiatrist this month  Review of Systems   Constitutional: Negative for chills, fatigue and fever  HENT: Negative for congestion, ear pain and sinus pressure  Eyes: Negative for visual disturbance  Respiratory: Negative for cough, chest tightness and shortness of breath  Cardiovascular: Negative for chest pain and palpitations  Gastrointestinal: Negative for diarrhea, nausea and vomiting  Endocrine: Negative for polyuria  Genitourinary: Negative for dysuria and frequency  Musculoskeletal: Positive for arthralgias and myalgias  Skin: Negative for pallor and rash  Neurological: Positive for headaches  Negative for dizziness, weakness, light-headedness and numbness  Psychiatric/Behavioral: Negative for agitation, behavioral problems and sleep disturbance  All other systems reviewed and are negative  Current Outpatient Medications on File Prior to Visit   Medication Sig   • albuterol (2 5 mg/3 mL) 0 083 % nebulizer solution Take 3 mL (2 5 mg total) by nebulization every 6 (six) hours as needed for wheezing or shortness of breath   • albuterol (ProAir HFA) 90 mcg/act inhaler Inhale 2 puffs every 6 (six) hours as needed for wheezing   • butalbital-aspirin-caffeine (FIORINAL) -40 mg capsule Take 1-2 tabs p o  Q 8 hours p r n  Severe headache   • fluticasone (FLONASE) 50 mcg/act nasal spray 2 sprays into each nostril daily   • hydrOXYzine pamoate (VISTARIL) 25 mg capsule    • ipratropium (ATROVENT) 0 02 % nebulizer solution Take 2 5 mL (0 5 mg total) by nebulization every 6 (six) hours as needed for wheezing or shortness of breath   • ondansetron (Zofran ODT) 4 mg disintegrating tablet Take 1 tablet (4 mg total) by mouth every 8 (eight) hours   • Prenatal MV-Min-FA-Omega-3 (PRENATAL GUMMIES/DHA & FA PO) Take by mouth   • sertraline (ZOLOFT) 25 mg tablet    • [DISCONTINUED] methylPREDNISolone 4 MG tablet therapy pack Use as directed on package (Patient not taking: Reported on 10/27/2022)       Objective     /80 (BP Location: Left arm, Patient Position: Sitting, Cuff Size: Adult)   Pulse 102   Temp 97 7 °F (36 5 °C) (Temporal)   Ht 5' 6 5" (1 689 m) Comment: on record  Wt 107 kg (235 lb)   BMI 37 36 kg/m²     Physical Exam  Vitals and nursing note reviewed  Constitutional:       General: She is not in acute distress  Appearance: She is well-developed  HENT:      Head: Normocephalic and atraumatic  Right Ear: External ear normal       Left Ear: External ear normal       Nose: Nose normal       Mouth/Throat:      Pharynx: No oropharyngeal exudate     Eyes:      Conjunctiva/sclera: Conjunctivae normal       Pupils: Pupils are equal, round, and reactive to light  Neck:      Thyroid: No thyromegaly  Trachea: No tracheal deviation  Cardiovascular:      Rate and Rhythm: Normal rate and regular rhythm  Heart sounds: Normal heart sounds  No murmur heard  No friction rub  Pulmonary:      Effort: Pulmonary effort is normal  No respiratory distress  Breath sounds: Normal breath sounds  No wheezing or rales  Abdominal:      General: Bowel sounds are normal  There is no distension  Palpations: Abdomen is soft  Tenderness: There is no abdominal tenderness  There is no guarding or rebound  Musculoskeletal:         General: No tenderness  Normal range of motion  Cervical back: Normal range of motion and neck supple  Lymphadenopathy:      Cervical: No cervical adenopathy  Skin:     General: Skin is warm and dry  Findings: No erythema or rash  Neurological:      Mental Status: She is alert and oriented to person, place, and time  Cranial Nerves: No cranial nerve deficit  Coordination: Coordination normal    Psychiatric:         Behavior: Behavior normal          Thought Content:  Thought content normal        Jenna Villegas PA-C

## 2022-12-20 ENCOUNTER — TELEPHONE (OUTPATIENT)
Dept: FAMILY MEDICINE CLINIC | Facility: CLINIC | Age: 27
End: 2022-12-20

## 2022-12-20 NOTE — TELEPHONE ENCOUNTER
----- Message from Nilda HILTON sent at 12/19/2022  1:54 PM EST -----  Regarding: FW: Asthma  Contact: [phone]    ----- Message -----  From: Joe De La Cruz  Sent: 12/19/2022  11:17 AM EST  To: [facility]  Subject: Asthma                                           I have had an asthma flair up and have been doing my at home nebulizer treatments but my lungs are just a bit weak  Would it be possible to have a steroid pack sent in?  It's helped in the past  I think I just need extra help with this asthma

## 2022-12-20 NOTE — TELEPHONE ENCOUNTER
Called left voicemail to call our office back to schedule an apt with any provider for asthma flair up

## 2022-12-28 ENCOUNTER — OFFICE VISIT (OUTPATIENT)
Dept: NEUROLOGY | Facility: CLINIC | Age: 27
End: 2022-12-28

## 2022-12-28 VITALS
DIASTOLIC BLOOD PRESSURE: 71 MMHG | HEIGHT: 66 IN | BODY MASS INDEX: 37.57 KG/M2 | RESPIRATION RATE: 18 BRPM | HEART RATE: 69 BPM | TEMPERATURE: 97.6 F | OXYGEN SATURATION: 97 % | SYSTOLIC BLOOD PRESSURE: 119 MMHG | WEIGHT: 233.8 LBS

## 2022-12-28 DIAGNOSIS — G90.A POTS (POSTURAL ORTHOSTATIC TACHYCARDIA SYNDROME): ICD-10-CM

## 2022-12-28 DIAGNOSIS — G43.709 CHRONIC MIGRAINE WITHOUT AURA WITHOUT STATUS MIGRAINOSUS, NOT INTRACTABLE: Primary | ICD-10-CM

## 2022-12-28 PROBLEM — G43.911 INTRACTABLE MIGRAINE WITH STATUS MIGRAINOSUS: Status: RESOLVED | Noted: 2022-10-08 | Resolved: 2022-12-28

## 2022-12-28 RX ORDER — CYPROHEPTADINE HYDROCHLORIDE 4 MG/1
TABLET ORAL
Qty: 180 TABLET | Refills: 0 | Status: SHIPPED | OUTPATIENT
Start: 2022-12-28

## 2022-12-28 RX ORDER — ACETAMINOPHEN, ASPIRIN AND CAFFEINE 250; 250; 65 MG/1; MG/1; MG/1
1 TABLET, FILM COATED ORAL AS NEEDED
COMMUNITY

## 2022-12-28 RX ORDER — RIZATRIPTAN BENZOATE 10 MG/1
TABLET, ORALLY DISINTEGRATING ORAL
Qty: 36 TABLET | Refills: 0 | Status: SHIPPED | OUTPATIENT
Start: 2022-12-28

## 2022-12-28 RX ORDER — BUPROPION HYDROCHLORIDE 100 MG/1
TABLET, EXTENDED RELEASE ORAL
COMMUNITY
Start: 2022-12-21

## 2022-12-28 RX ORDER — METOCLOPRAMIDE 10 MG/1
TABLET ORAL
Qty: 40 TABLET | Refills: 0 | Status: SHIPPED | OUTPATIENT
Start: 2022-12-28

## 2022-12-28 NOTE — PROGRESS NOTES
Patient ID: Leticia Shaikh is a 32 y o  female  Assessment/Plan:    POTS (postural orthostatic tachycardia syndrome)  Patient reports a hx of POTS  She was previously followed by a POTS specialist, she has not established care since moving with a cardiologist   She would like a referral, provided to her today  Chronic migraine without aura without status migrainosus, not intractable  Leticia Shaikh is a 32year old female with chronic migraine headaches and milder headaches that seem to have a cervicogenic component but could also be tension type  She is actively trying to become pregnant at this time but is interested in pursuing treatment for her migraines at this time  She has failed several abortive agents, but has not trialed any preventative medications with the exception of wellbutrin, Vistaril, and Zoloft prescribed for her PTSD/Anxiety and Depression  Zoloft was recently added to her regimen about 8 weeks ago with an improvement in mood, but no difference in headaches  Her exam is excellent and there are no focal, motor or sensory neurologic deficits  We discussed as she is actively trying to become pregnant that her treatment options may be limited  Cyproheptadine would be a safe option during pregnancy however we did discuss that this should not be used together with other antihistamines such as Vistatril  We discussed Nurtec is another option that she could use as a preventative or abortive for now and then discontinue once she has a confirmed pregnancy test  She would feel more comfortable proceeding with Cyproheptadine and will be sure not to use Vistaril at bedtime  We also discussed vitamin supplementation, she will ensure her current prenatal vitamin includes riboflavin and magnesium in adequate amounts  As an abortive we will try Rizatriptan (ODT due to her nausea) and she will combine this with Reglan and ibuprofen   We discussed it is imperative she treat her migraines at the onset rather than waiting for it to be most effective  We discussed rebound/medication over use headaches and she understands she should not use these medications or other OTCs more than 3 times per week  We discussed other healthy lifestyle measures and natural alternative treatments (accupuncture, CBT, Biofeedback etc) that may also improve her headaches  She is questioning if her POTS may also be contributing to headaches, she has not seen a cardiologist since moving  I did agree to provide her a referral to a cardiologist for further management and discussion surrounding treatment of her POTS  To complete her work up and to rule out any intracranial abnormalities or other underlying etiology, given the worsening frequency and severity of her headaches, we will also have her complete a MRI Brain w/o contrast and also complete labs in addition to her pending labs ordered by her PCP  Plan as outlined below  Plan:    Headache/migraine treatment:   - When you have a moderate to severe headache, you should seek rest, initiate relaxation and apply cold compresses to the head  Abortive medications (for immediate treatment of a headache): It is ok to take ibuprofen, acetaminophen or naproxen (Advil, Tylenol,  Aleve, Excedrin) if they help your headaches you should limit these to no more than 3 times a week to avoid medication overuse/rebound headaches       Take Maxalt (Rizatriptan) 10 mg +/- Reglan 10 mg +/- Ibuprofen 200 mg at the onset of a migraine headache     Over the counter preventive supplements for headaches/migraines   (to take every day to help prevent headaches - not to take at the time of headache):  [x] Magnesium 500mg daily (If any diarrhea or upset stomach, decrease dose  as tolerated)  [x] Riboflavin (Vitamin B2) 400mg daily (FYI B2 may make your urine bright/neon yellow)     Prescription preventive medications for headaches/migraines   (to take every day to help prevent headaches - not to take at the time of headache):    Cyproheptadine 4 mg at bedtime x 2 weeks, can increase to 8 mg thereafter if needed         Self-Monitoring:  [x] Headache calendar  Each day davide a number from 0-10 indicating if there was a headache and how bad it was  This can be used to monitor gradual improvement and is helpful to make medication adjustments  You can do this on paper or there is an DAYNA for a smart phone called "Migraine e Diary"  Lifestyle Recommendations:  [x] SLEEP - Maintain a regular sleep schedule: Adults need at least 7-8 hours of uninterrupted a night  Maintain good sleep hygiene:  Going to bed and waking up at consistent times, avoiding excessive daytime naps, avoiding caffeinated beverages in the evening, avoid excessive stimulation in the evening and generally using bed primarily for sleeping  One hour before bedtime would recommend turning lights down lower, decreasing your activity (may read quietly, listen to music at a low volume)  When you get into bed, should eliminate all technology (no texting, emailing, playing with your phone, iPad or tablet in bed)  [x] HYDRATION - Maintain good hydration  Drink  2L of fluid a day (4 typical small water bottles)  [x] DIET - Maintain good nutrition  In particular don't skip meals and try and eat healthy balanced meals regularly  [x] TRIGGERS - Look for other triggers and avoid them: Limit caffeine to 1-2 cups a day or less  Avoid dietary triggers that you have noticed bring on your headaches (this could include aged cheese, peanuts, MSG, aspartame and nitrates)  [x] EXERCISE - physical exercise as we all know is good for you in many ways, and not only is good for your heart, but also is beneficial for your mental health, cognitive health and  chronic pain/headaches  I would encourage at the least 5 days of physical exercise weekly for at least 30 minutes  Education and Follow-up  [x] Please call with any questions or concerns   Of course if any new concerning symptoms go to the emergency department  [x] Follow up in 3 months, sooner if needed  [x] Complete labs and MRI Brain w/o contrast  [x] Referral to Cardiology for POTS  [x] relaxation and other alternative approaches for managing headaches- acupuncture, massage, cognitive behavior therapy, biofeedback therapy  [x] You may consider these medical devices: Martha Macias or Brigido             Diagnoses and all orders for this visit:    Chronic migraine without aura without status migrainosus, not intractable  -     Ambulatory Referral to Neurology  -     metoclopramide (REGLAN) 10 mg tablet; Take 1 tablet at the onset of a migraine headache with rizatriptan  -     rizatriptan (MAXALT-MLT) 10 mg disintegrating tablet; Take 1 tablet at the onset of a migraine, may repeat once in 2 hours  Max of 3 times per week  -     cyproheptadine (PERIACTIN) 4 mg tablet; 1 tab at bedtime x 2 weeks, then increase to 2 tabs at bedtime  -     C-reactive protein; Future  -     TSH, 3rd generation; Future  -     Vitamin B12; Future  -     Vitamin D 25 hydroxy; Future  -     MRI brain without contrast; Future    POTS (postural orthostatic tachycardia syndrome)  -     Ambulatory Referral to Cardiology; Future      I have spent a total of 60 minutes in face to face time and chart review with this patient today  Subjective:    AJIT Shaikh is a 32year old female with a pmhx of asthma, allergies, POTS syndrome, neuropathy, PTSD, endometriosis and obesity who presents to the office today in consultation for her migraine headaches and milder tension type headaches   She describes them in further detail below:    "Tension Type" Headaches  First developed in teens, worse in the last 1 year  Daily in frequency  Mostly occipital area with tension in her neck, sometimes behind the eyes  Worse with Anxiety  Described as heavy, pressure, and "haziness", dizziness at times  4-6/10 on pain scale  Tiger balm helps to some degree; not using any other OTCs  Not any worse at any particular time of day    Migraine headaches  First developed around 12years of age  3-4/month; duration can vary 2-9 days   Photophobia, phonophobia, nausea; denies any systemic symptoms  Usually right sided, parietal/temporal; can radiate to the entire head "achiness"  Pulsating, sharp stabbing; did have one recent migraine where the stabbing pain was so severe it caused her right sided head, neck and shoulder twitching  9/10 on pain scale  Uses Excedrin, can reduce to 7/10 but does not resolve it  No Aura  Gradual build up; no particular time of day  No specific triggers; if POTS is worse then headaches are worse that day  Awaken from sleep?: no  Seasonal pattern?: no  'Clustering' of HA's over time? 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 yes - 2 concussions in the past (8 and 15)  History of head/neck surgery? no  Family h/o headache problems? yes - mom, 2 maternal aunts  Family history of aneurysms? no  Exposure to carbon monoxide? no  Substance use: alcohol: socially, caffeine: 16 oz coffee qod  Water: 48-60 oz  Diet: she often skips breakfast, she tries to eat 2 meals a day and does snack through out the day  Sleep: about 6-7 hours a night   Mood: PTSD, Anxiety, Depression- she feels is "stable"  She is Actively trying to conceive a pregnancy     Current Use of Meds to Treat HA:  Abortive meds? Excedrin, Tiger balm  Daily use? no  Preventive meds?  None for migraines but is currently on Wellbutrin , Zoloft 100 mg and Vistaril 25 mg prn for PTSD, anxiety and depression   Non-Medical/Alternative treatments for HA: Chiropractor- last seen a few weeks ago     Prior Evaluation/Treatments:  Have you seen another physician for your headaches? no  Prior work-up: none; Pending labs- CBC/CMP/Lyme ab/ Sed rate  Trigger point injections? no  Botox injections? no  Epidural injections? No    Prior PREVENTATIVE medications: none   Prior ABORTIVE mediations: Tylenol- ineffective, has received a migraine cocktail in the ER in the past which was effective  Fioricet- ineffective  Prednisone- did help some but did completely resolve headache  Sumatriptan- ineffective  Zofran- helped with nausea but caused dizziness  Tizanidine- helped her sleep but did not help headache or muscle tension      The following portions of the patient's history were reviewed and updated as appropriate: allergies, current medications, past family history, past medical history, past social history and past surgical history  Objective:    Blood pressure 119/71, pulse 69, temperature 97 6 °F (36 4 °C), temperature source Tympanic, resp  rate 18, height 5' 6" (1 676 m), weight 106 kg (233 lb 12 8 oz), SpO2 97 %, not currently breastfeeding  Physical Exam  Constitutional:       General: She is not in acute distress  Appearance: Normal appearance  She is not ill-appearing  HENT:      Head: Normocephalic and atraumatic  Nose: Nose normal       Mouth/Throat:      Mouth: Mucous membranes are moist       Pharynx: Oropharynx is clear  No oropharyngeal exudate or posterior oropharyngeal erythema  Eyes:      Extraocular Movements: Extraocular movements intact  Conjunctiva/sclera: Conjunctivae normal       Pupils: Pupils are equal, round, and reactive to light  Cardiovascular:      Rate and Rhythm: Normal rate and regular rhythm  Pulses: Normal pulses  Pulmonary:      Effort: Pulmonary effort is normal  No respiratory distress  Musculoskeletal:         General: Normal range of motion  Right lower leg: No edema  Left lower leg: No edema  Skin:     General: Skin is warm and dry  Neurological:      General: No focal deficit present        Mental Status: She is alert and oriented to person, place, and time  Cranial Nerves: No cranial nerve deficit  Sensory: No sensory deficit  Motor: No weakness  Coordination: Coordination normal       Gait: Gait normal       Deep Tendon Reflexes: Reflexes normal    Psychiatric:         Mood and Affect: Mood normal          Behavior: Behavior normal          Thought Content: Thought content normal          Judgment: Judgment normal        Neurological Exam  On neurological examination patient is alert, awake, oriented and in no distress  Speech is fluent without dysarthria or aphasia  Cranial nerves 2-12 were symmetrically intact bilaterally  No evidence of any focal weakness or sensory loss in the upper or lower extremities  Motor testing reveals 5/5 strength of the bilateral upper and lower extremities  There was no pronator drift  No fasciculations present  No abnormal involuntary movements  Finger- to-nose reveals no tremor or ataxia and intact proprioceptive function, no dysmetria was noted  Rapid alternating movement normal  Sensation was intact to vibration, light touch, and temperature in bilateral upper and lower extremities  Deep tendon reflexes were 2+ and symmetric in the bilateral upper and lower extremities  She is able to rise easily without assistance from a seated position  Casual gait is normal including stance, stride, and arm swing  Normal tandem gait  Romberg is absent  ROS:    Review of Systems  Constitutional: Positive for fatigue  Negative for appetite change and fever  HENT: Negative  Negative for hearing loss, tinnitus, trouble swallowing and voice change  Eyes: Negative  Negative for photophobia, pain and visual disturbance  Respiratory: Negative  Negative for shortness of breath  Cardiovascular: Negative  Negative for palpitations  Gastrointestinal: Negative  Negative for nausea and vomiting  Endocrine: Negative  Negative for cold intolerance  Genitourinary: Negative  Negative for dysuria, frequency and urgency  Musculoskeletal: Positive for gait problem (balance issues), neck pain (at times) and neck stiffness (at times)  Negative for myalgias  Skin: Negative  Negative for rash  Allergic/Immunologic: Negative  Neurological: Positive for dizziness, light-headedness and headaches  Negative for tremors, seizures, syncope, facial asymmetry, speech difficulty, weakness and numbness  Hematological: Negative  Does not bruise/bleed easily  Psychiatric/Behavioral: Positive for sleep disturbance  Negative for confusion and hallucinations  The patient is nervous/anxious  Depression, anxiety     Reviewed ROS as entered by medical assistant

## 2022-12-28 NOTE — ASSESSMENT & PLAN NOTE
Kevan Tomlin is a 32year old female with chronic migraine headaches and milder headaches that seem to have a cervicogenic component but could also be tension type  She is actively trying to become pregnant at this time but is interested in pursuing treatment for her migraines at this time  She has failed several abortive agents, but has not trialed any preventative medications with the exception of wellbutrin, Vistaril, and Zoloft prescribed for her PTSD/Anxiety and Depression  Zoloft was recently added to her regimen about 8 weeks ago with an improvement in mood, but no difference in headaches  Her exam is excellent and there are no focal, motor or sensory neurologic deficits  We discussed as she is actively trying to become pregnant that her treatment options may be limited  Cyproheptadine would be a safe option during pregnancy however we did discuss that this should not be used together with other antihistamines such as Vistatril  We discussed Nurtec is another option that she could use as a preventative or abortive for now and then discontinue once she has a confirmed pregnancy test  She would feel more comfortable proceeding with Cyproheptadine and will be sure not to use Vistaril at bedtime  We also discussed vitamin supplementation, she will ensure her current prenatal vitamin includes riboflavin and magnesium in adequate amounts  As an abortive we will try Rizatriptan (ODT due to her nausea) and she will combine this with Reglan and ibuprofen  We discussed it is imperative she treat her migraines at the onset rather than waiting for it to be most effective  We discussed rebound/medication over use headaches and she understands she should not use these medications or other OTCs more than 3 times per week  We discussed other healthy lifestyle measures and natural alternative treatments (accupuncture, CBT, Biofeedback etc) that may also improve her headaches   She is questioning if her POTS may also be contributing to headaches, she has not seen a cardiologist since moving  I did agree to provide her a referral to a cardiologist for further management and discussion surrounding treatment of her POTS  To complete her work up and to rule out any intracranial abnormalities or other underlying etiology, given the worsening frequency and severity of her headaches, we will also have her complete a MRI Brain w/o contrast and also complete labs in addition to her pending labs ordered by her PCP  Plan as outlined below  Plan:    Headache/migraine treatment:   - When you have a moderate to severe headache, you should seek rest, initiate relaxation and apply cold compresses to the head  Abortive medications (for immediate treatment of a headache): It is ok to take ibuprofen, acetaminophen or naproxen (Advil, Tylenol,  Aleve, Excedrin) if they help your headaches you should limit these to no more than 3 times a week to avoid medication overuse/rebound headaches  Take Maxalt (Rizatriptan) 10 mg +/- Reglan 10 mg +/- Ibuprofen 200 mg at the onset of a migraine headache     Over the counter preventive supplements for headaches/migraines   (to take every day to help prevent headaches - not to take at the time of headache):  [x] Magnesium 500mg daily (If any diarrhea or upset stomach, decrease dose  as tolerated)  [x] Riboflavin (Vitamin B2) 400mg daily (FYI B2 may make your urine bright/neon yellow)     Prescription preventive medications for headaches/migraines   (to take every day to help prevent headaches - not to take at the time of headache):    Cyproheptadine 4 mg at bedtime x 2 weeks, can increase to 8 mg thereafter if needed         Self-Monitoring:  [x] Headache calendar  Each day davide a number from 0-10 indicating if there was a headache and how bad it was  This can be used to monitor gradual improvement and is helpful to make medication adjustments   You can do this on paper or there is an DAYNA for a smart phone called "Migraine e Diary"  Lifestyle Recommendations:  [x] SLEEP - Maintain a regular sleep schedule: Adults need at least 7-8 hours of uninterrupted a night  Maintain good sleep hygiene:  Going to bed and waking up at consistent times, avoiding excessive daytime naps, avoiding caffeinated beverages in the evening, avoid excessive stimulation in the evening and generally using bed primarily for sleeping  One hour before bedtime would recommend turning lights down lower, decreasing your activity (may read quietly, listen to music at a low volume)  When you get into bed, should eliminate all technology (no texting, emailing, playing with your phone, iPad or tablet in bed)  [x] HYDRATION - Maintain good hydration  Drink  2L of fluid a day (4 typical small water bottles)  [x] DIET - Maintain good nutrition  In particular don't skip meals and try and eat healthy balanced meals regularly  [x] TRIGGERS - Look for other triggers and avoid them: Limit caffeine to 1-2 cups a day or less  Avoid dietary triggers that you have noticed bring on your headaches (this could include aged cheese, peanuts, MSG, aspartame and nitrates)  [x] EXERCISE - physical exercise as we all know is good for you in many ways, and not only is good for your heart, but also is beneficial for your mental health, cognitive health and  chronic pain/headaches  I would encourage at the least 5 days of physical exercise weekly for at least 30 minutes  Education and Follow-up  [x] Please call with any questions or concerns  Of course if any new concerning symptoms go to the emergency department    [x] Follow up in 3 months, sooner if needed  [x] Complete labs and MRI Brain w/o contrast  [x] Referral to Cardiology for POTS  [x] relaxation and other alternative approaches for managing headaches- acupuncture, massage, cognitive behavior therapy, biofeedback therapy  [x] You may consider these medical devices: Mello Beagle or Jose Martin Cunas

## 2022-12-28 NOTE — PROGRESS NOTES
Patient ID: Nathalie Luna is a 32 y o  female  Assessment/Plan:    No problem-specific Assessment & Plan notes found for this encounter  {Assess/PlanSmartLinks:05687}       Subjective:    HPI    {[facility] HPI texts:76300}    {Common ambulatory SmartLinks:08140}         Objective:    Blood pressure 119/71, pulse 69, temperature 97 6 °F (36 4 °C), temperature source Tympanic, resp  rate 18, height 5' 6" (1 676 m), weight 106 kg (233 lb 12 8 oz), SpO2 97 %, not currently breastfeeding  Physical Exam    Neurological Exam      ROS:    Review of Systems   Constitutional: Positive for fatigue  Negative for appetite change and fever  HENT: Negative  Negative for hearing loss, tinnitus, trouble swallowing and voice change  Eyes: Negative  Negative for photophobia, pain and visual disturbance  Respiratory: Negative  Negative for shortness of breath  Cardiovascular: Negative  Negative for palpitations  Gastrointestinal: Negative  Negative for nausea and vomiting  Endocrine: Negative  Negative for cold intolerance  Genitourinary: Negative  Negative for dysuria, frequency and urgency  Musculoskeletal: Positive for gait problem (balance issues), neck pain (at times) and neck stiffness (at times)  Negative for myalgias  Skin: Negative  Negative for rash  Allergic/Immunologic: Negative  Neurological: Positive for dizziness, light-headedness and headaches  Negative for tremors, seizures, syncope, facial asymmetry, speech difficulty, weakness and numbness  Hematological: Negative  Does not bruise/bleed easily  Psychiatric/Behavioral: Positive for sleep disturbance  Negative for confusion and hallucinations  The patient is nervous/anxious           Depression, anxiety

## 2022-12-28 NOTE — PATIENT INSTRUCTIONS
Headache/migraine treatment:   - When you have a moderate to severe headache, you should seek rest, initiate relaxation and apply cold compresses to the head  Abortive medications (for immediate treatment of a headache): It is ok to take ibuprofen, acetaminophen or naproxen (Advil, Tylenol,  Aleve, Excedrin) if they help your headaches you should limit these to no more than 3 times a week to avoid medication overuse/rebound headaches  Take Maxalt (Rizatriptan) 10 mg +/- Reglan 10 mg +/- Ibuprofen 200 mg at the onset of a migraine headache     Over the counter preventive supplements for headaches/migraines   (to take every day to help prevent headaches - not to take at the time of headache):  [x] Magnesium 500mg daily (If any diarrhea or upset stomach, decrease dose  as tolerated)  [x] Riboflavin (Vitamin B2) 400mg daily (FYI B2 may make your urine bright/neon yellow)     Prescription preventive medications for headaches/migraines   (to take every day to help prevent headaches - not to take at the time of headache):    Cyproheptadine 4 mg at bedtime x 2 weeks, can increase to 8 mg thereafter if needed         Self-Monitoring:  [x] Headache calendar  Each day advide a number from 0-10 indicating if there was a headache and how bad it was  This can be used to monitor gradual improvement and is helpful to make medication adjustments  You can do this on paper or there is an DAYNA for a smart phone called "Migraine e Diary"  Lifestyle Recommendations:  [x] SLEEP - Maintain a regular sleep schedule: Adults need at least 7-8 hours of uninterrupted a night  Maintain good sleep hygiene:  Going to bed and waking up at consistent times, avoiding excessive daytime naps, avoiding caffeinated beverages in the evening, avoid excessive stimulation in the evening and generally using bed primarily for sleeping    One hour before bedtime would recommend turning lights down lower, decreasing your activity (may read quietly, listen to music at a low volume)  When you get into bed, should eliminate all technology (no texting, emailing, playing with your phone, iPad or tablet in bed)  [x] HYDRATION - Maintain good hydration  Drink  2L of fluid a day (4 typical small water bottles)  [x] DIET - Maintain good nutrition  In particular don't skip meals and try and eat healthy balanced meals regularly  [x] TRIGGERS - Look for other triggers and avoid them: Limit caffeine to 1-2 cups a day or less  Avoid dietary triggers that you have noticed bring on your headaches (this could include aged cheese, peanuts, MSG, aspartame and nitrates)  [x] EXERCISE - physical exercise as we all know is good for you in many ways, and not only is good for your heart, but also is beneficial for your mental health, cognitive health and  chronic pain/headaches  I would encourage at the least 5 days of physical exercise weekly for at least 30 minutes  Education and Follow-up  [x] Please call with any questions or concerns  Of course if any new concerning symptoms go to the emergency department    [x] Follow up in 3 months, sooner if needed  [x] Complete labs and MRI Brain w/o contrast  [x] Referral to Cardiology for POTS  [x] relaxation and other alternative approaches for managing headaches- acupuncture, massage, cognitive behavior therapy, biofeedback therapy  [x] You may consider these medical devices: Hakeem Gallagher or Brigido

## 2022-12-28 NOTE — ASSESSMENT & PLAN NOTE
Patient reports a hx of POTS  She was previously followed by a POTS specialist, she has not established care since moving with a cardiologist   She would like a referral, provided to her today

## 2023-01-11 ENCOUNTER — OFFICE VISIT (OUTPATIENT)
Dept: FAMILY MEDICINE CLINIC | Facility: CLINIC | Age: 28
End: 2023-01-11

## 2023-01-11 ENCOUNTER — HOSPITAL ENCOUNTER (OUTPATIENT)
Dept: CT IMAGING | Facility: HOSPITAL | Age: 28
Discharge: HOME | End: 2023-01-11

## 2023-01-11 VITALS — HEART RATE: 68 BPM | SYSTOLIC BLOOD PRESSURE: 122 MMHG | DIASTOLIC BLOOD PRESSURE: 80 MMHG | TEMPERATURE: 97.5 F

## 2023-01-11 DIAGNOSIS — J45.20 MILD INTERMITTENT ASTHMA WITHOUT COMPLICATION: ICD-10-CM

## 2023-01-11 DIAGNOSIS — R31.0 GROSS HEMATURIA: Primary | ICD-10-CM

## 2023-01-11 DIAGNOSIS — R35.0 FREQUENT URINATION: ICD-10-CM

## 2023-01-11 DIAGNOSIS — N30.01 ACUTE CYSTITIS WITH HEMATURIA: ICD-10-CM

## 2023-01-11 DIAGNOSIS — R31.0 GROSS HEMATURIA: ICD-10-CM

## 2023-01-11 LAB
SL AMB  POCT GLUCOSE, UA: NORMAL
SL AMB LEUKOCYTE ESTERASE,UA: NORMAL
SL AMB POCT BILIRUBIN,UA: NORMAL
SL AMB POCT BLOOD,UA: NORMAL
SL AMB POCT CLARITY,UA: NORMAL
SL AMB POCT COLOR,UA: NORMAL
SL AMB POCT KETONES,UA: NORMAL
SL AMB POCT NITRITE,UA: NORMAL
SL AMB POCT PH,UA: 6
SL AMB POCT SPECIFIC GRAVITY,UA: 1.02
SL AMB POCT URINE PROTEIN: NORMAL
SL AMB POCT UROBILINOGEN: 0.2

## 2023-01-11 RX ORDER — NITROFURANTOIN 25; 75 MG/1; MG/1
100 CAPSULE ORAL 2 TIMES DAILY
Qty: 10 CAPSULE | Refills: 0 | Status: SHIPPED | OUTPATIENT
Start: 2023-01-11 | End: 2023-01-16

## 2023-01-11 NOTE — PROGRESS NOTES
Name: Rolly Cortez      : 1995      MRN: [id number]  Encounter Provider: Kriste Babinski, CRNP  Encounter Date: 2023   Encounter department: [place] [address]     1  Gross hematuria  Assessment & Plan:  Due to patient's noted hematuria and her reports of passing clots earlier in the day a stat CT renal stone study was ordered to rule out acute nephrolithiasis  Patient was advised to increase her water intake  Orders:  -     CT renal stone study abdomen pelvis wo contrast; Future; Expected date: 2023    2  Frequent urination  -     POCT urine dip    3  Acute cystitis with hematuria  Assessment & Plan:  A 5-day course of Macrobid was ordered to cover for acute UTI  Orders:  -     nitrofurantoin (MACROBID) 100 mg capsule; Take 1 capsule (100 mg total) by mouth 2 (two) times a day for 5 days    4  Mild intermittent asthma without complication  Assessment & Plan:  Well-controlled with as needed use of albuterol inhaler  BMI Counseling: There is no height or weight on file to calculate BMI  The BMI is above normal  Nutrition recommendations include decreasing portion sizes, encouraging healthy choices of fruits and vegetables, moderation in carbohydrate intake and increasing intake of lean protein  Exercise recommendations include exercising 3-5 times per week and obtaining a gym membership  No pharmacotherapy was ordered  Rationale for BMI follow-up plan is due to patient being overweight or obese  Subjective      UTI symptoms: Patient's urine dip performed in the office today showed a small amount of leukocyte estarase and a large amount of blood  The patient reports that starting this morning she began noting hematuria, passing blood clots, dysuria, increased frequency, urgency, incomplete bladder emptying, and lower pelvic pressure  The patient denies any fever, chills, or lower back pain    The patient reports that she has not been passing blood clots over the past few hours and she has not noted any more hematuria since this morning  Asthma: Well-controlled with as needed use of albuterol inhaler  The patient reports rarely needing to use her rescue inhaler  Review of Systems   Constitutional: Negative for chills and fever  HENT: Negative for ear pain and sore throat  Eyes: Negative for pain and visual disturbance  Respiratory: Negative for cough, chest tightness, shortness of breath and wheezing  Cardiovascular: Negative for chest pain, palpitations and leg swelling  Gastrointestinal: Negative for abdominal pain, constipation, diarrhea, nausea and vomiting  Endocrine: Negative for cold intolerance and heat intolerance  Genitourinary: Positive for dysuria, frequency, hematuria and urgency  Negative for decreased urine volume, difficulty urinating and flank pain  Pelvic pressure and incomplete bladder emptying   Musculoskeletal: Negative for arthralgias, back pain and myalgias  Skin: Negative for color change and rash  Allergic/Immunologic: Negative for environmental allergies  Neurological: Negative for dizziness, seizures, syncope, weakness, light-headedness, numbness and headaches  Hematological: Negative for adenopathy  Psychiatric/Behavioral: Negative for confusion  The patient is not nervous/anxious  All other systems reviewed and are negative        Current Outpatient Medications on File Prior to Visit   Medication Sig   • albuterol (2 5 mg/3 mL) 0 083 % nebulizer solution Take 3 mL (2 5 mg total) by nebulization every 6 (six) hours as needed for wheezing or shortness of breath   • albuterol (ProAir HFA) 90 mcg/act inhaler Inhale 2 puffs every 6 (six) hours as needed for wheezing   • aspirin-acetaminophen-caffeine (EXCEDRIN MIGRAINE) 250-250-65 MG per tablet Take 1 tablet by mouth as needed for headaches   • buPROPion (WELLBUTRIN SR) 100 mg 12 hr tablet    • cyproheptadine (PERIACTIN) 4 mg tablet 1 tab at bedtime x 2 weeks, then increase to 2 tabs at bedtime   • fluticasone (FLONASE) 50 mcg/act nasal spray 2 sprays into each nostril daily   • hydrOXYzine pamoate (VISTARIL) 25 mg capsule Take 25 mg by mouth as needed   • ipratropium (ATROVENT) 0 02 % nebulizer solution Take 2 5 mL (0 5 mg total) by nebulization every 6 (six) hours as needed for wheezing or shortness of breath   • metoclopramide (REGLAN) 10 mg tablet Take 1 tablet at the onset of a migraine headache with rizatriptan   • Prenatal MV-Min-FA-Omega-3 (PRENATAL GUMMIES/DHA & FA PO) Take by mouth   • rizatriptan (MAXALT-MLT) 10 mg disintegrating tablet Take 1 tablet at the onset of a migraine, may repeat once in 2 hours  Max of 3 times per week   • sertraline (ZOLOFT) 25 mg tablet Take 100 mg by mouth daily   • tiZANidine (ZANAFLEX) 4 mg tablet Take 1 tablet (4 mg total) by mouth daily at bedtime       Objective     /80 (BP Location: Right arm, Patient Position: Sitting, Cuff Size: Large)   Pulse 68   Temp 97 5 °F (36 4 °C) (Temporal)     Physical Exam  Vitals and nursing note reviewed  Constitutional:       General: She is not in acute distress  Appearance: Normal appearance  She is not ill-appearing  HENT:      Head: Normocephalic  Eyes:      Conjunctiva/sclera: Conjunctivae normal    Cardiovascular:      Rate and Rhythm: Normal rate and regular rhythm  Pulses: Normal pulses  Carotid pulses are 2+ on the right side and 2+ on the left side  Radial pulses are 2+ on the right side and 2+ on the left side  Posterior tibial pulses are 2+ on the right side and 2+ on the left side  Heart sounds: Normal heart sounds  No murmur heard  Pulmonary:      Effort: Pulmonary effort is normal  No respiratory distress  Breath sounds: Normal breath sounds  No wheezing or rhonchi  Abdominal:      General: Abdomen is flat  Bowel sounds are normal  There is no distension        Palpations: Abdomen is soft       Tenderness: There is no abdominal tenderness  There is no right CVA tenderness, left CVA tenderness or guarding  Musculoskeletal:         General: Normal range of motion  Cervical back: Normal range of motion  Right lower leg: No edema  Left lower leg: No edema  Skin:     General: Skin is warm and dry  Capillary Refill: Capillary refill takes less than 2 seconds  Neurological:      General: No focal deficit present  Mental Status: She is alert and oriented to person, place, and time  Psychiatric:         Mood and Affect: Mood normal          Behavior: Behavior normal          Thought Content:  Thought content normal          Judgment: Judgment normal        ABIMBOLA Ellison

## 2023-01-11 NOTE — ASSESSMENT & PLAN NOTE
Due to patient's noted hematuria and her reports of passing clots earlier in the day a stat CT renal stone study was ordered to rule out acute nephrolithiasis  Patient was advised to increase her water intake

## 2023-01-12 DIAGNOSIS — N30.01 ACUTE CYSTITIS WITH HEMATURIA: Primary | ICD-10-CM

## 2023-01-13 LAB — BACTERIA UR CULT: NORMAL

## 2023-01-17 NOTE — PROGRESS NOTES
Assessment/Plan:      She was advised to use ovulation predictors for the next 2 months and call the office if they are not pregnant by that time  Recommended monthly SBE and annual CBE  ASCCP guidelines reviewed and pap collected today  The patient declines STI testing at this time  Reviewed diet/activity recommendations  Return to the office in one year for routine annual gyn exam or sooner PRN  Diagnoses and all orders for this visit:    Encounter for gynecological examination (general) (routine) without abnormal findings    Encounter for Papanicolaou smear for cervical cancer screening  -     Liquid-based pap, screening          Subjective:      Patient ID: Jerel Patient is a 32 y o  female  This patient presents for routine annual gyn exam    She underwent laparoscopic ovarian cystectomy and D+C on 3/8/2022 for an indication of persistent fluctuating bHCG, pelvic pain and right adnexal mass measuring 4 cm  Pathology revealed hemorrhagic corpus luteum without evidence of ectopic pregnancy  Today, she reports menses are regular with normal flow  She and her  are actively trying to conceive since 2 weeks post op in 3/2022  She is taking a PNV  She states she has a conversation with psychiatry regarding medications and they advised her to continue meds  She is concerned because she has not become pregnant yet  She has not been using ovulation predictors  She reports a hx of endometriosis fulguration with OB/GYN in [address] [facility]  Will attempt to get those records  She denies breast concerns, abn discharge, pelvic pain, bowel/bladder dysfunction  Monogamous relationship  Denies STI concerns  The following portions of the patient's history were reviewed and updated as appropriate: allergies, current medications, past family history, past medical history, past social history, past surgical history and problem list     Review of Systems   Constitutional: Negative  HENT: Negative  Respiratory: Negative  Cardiovascular: Negative  Gastrointestinal: Negative  Endocrine: Negative  Genitourinary: Negative for difficulty urinating, dyspareunia, dysuria, frequency, menstrual problem, pelvic pain, urgency, vaginal bleeding, vaginal discharge and vaginal pain  Musculoskeletal: Negative  Skin: Negative  Neurological: Negative  Psychiatric/Behavioral: Negative  Objective:      /70   Pulse 89   Ht 5' 6" (1 676 m)   Wt 107 kg (235 lb)   LMP 12/29/2022 (Exact Date)   BMI 37 93 kg/m²          Physical Exam  Vitals and nursing note reviewed  Exam conducted with a chaperone present  Constitutional:       Appearance: Normal appearance  She is well-developed  HENT:      Head: Normocephalic and atraumatic  Neck:      Thyroid: No thyroid mass or thyromegaly  Cardiovascular:      Rate and Rhythm: Normal rate and regular rhythm  Heart sounds: Normal heart sounds  Pulmonary:      Effort: Pulmonary effort is normal       Breath sounds: Normal breath sounds  Chest:   Breasts:     Breasts are symmetrical       Right: No inverted nipple, mass, nipple discharge, skin change or tenderness  Left: No inverted nipple, mass, nipple discharge, skin change or tenderness  Abdominal:      General: Bowel sounds are normal       Palpations: Abdomen is soft  Tenderness: There is no abdominal tenderness  Hernia: There is no hernia in the left inguinal area or right inguinal area  Genitourinary:     General: Normal vulva  Exam position: Supine  Pubic Area: No rash  Labia:         Right: No rash, tenderness, lesion or injury  Left: No rash, tenderness, lesion or injury  Urethra: No prolapse, urethral pain, urethral swelling or urethral lesion  Vagina: Normal  No signs of injury and foreign body  No vaginal discharge, erythema, tenderness, bleeding, lesions or prolapsed vaginal walls        Cervix: No cervical motion tenderness, discharge, friability, lesion, erythema, cervical bleeding or eversion  Uterus: Not deviated, not enlarged, not fixed, not tender and no uterine prolapse  Adnexa:         Right: No mass, tenderness or fullness  Left: No mass, tenderness or fullness  Rectum: No external hemorrhoid  Comments: Urethra normal without lesions  No bladder tenderness  Musculoskeletal:         General: Normal range of motion  Cervical back: Normal range of motion and neck supple  Lymphadenopathy:      Lower Body: No right inguinal adenopathy  No left inguinal adenopathy  Skin:     General: Skin is warm and dry  Neurological:      Mental Status: She is alert and oriented to person, place, and time  Psychiatric:         Speech: Speech normal          Behavior: Behavior normal  Behavior is cooperative

## 2023-01-26 ENCOUNTER — ANNUAL EXAM (OUTPATIENT)
Dept: OBGYN CLINIC | Facility: CLINIC | Age: 28
End: 2023-01-26

## 2023-01-26 VITALS
WEIGHT: 235 LBS | DIASTOLIC BLOOD PRESSURE: 70 MMHG | HEIGHT: 66 IN | HEART RATE: 89 BPM | SYSTOLIC BLOOD PRESSURE: 110 MMHG | BODY MASS INDEX: 37.77 KG/M2

## 2023-01-26 DIAGNOSIS — Z12.4 ENCOUNTER FOR PAPANICOLAOU SMEAR FOR CERVICAL CANCER SCREENING: ICD-10-CM

## 2023-01-26 DIAGNOSIS — Z01.419 ENCOUNTER FOR GYNECOLOGICAL EXAMINATION (GENERAL) (ROUTINE) WITHOUT ABNORMAL FINDINGS: Primary | ICD-10-CM

## 2023-01-26 RX ORDER — LAMOTRIGINE 25 MG/1
TABLET ORAL
COMMUNITY
Start: 2023-01-19

## 2023-02-02 LAB
LAB AP GYN PRIMARY INTERPRETATION: ABNORMAL
Lab: ABNORMAL
PATH INTERP SPEC-IMP: ABNORMAL

## 2023-02-03 LAB
HPV HR 12 DNA CVX QL NAA+PROBE: POSITIVE
HPV16 DNA CVX QL NAA+PROBE: NEGATIVE
HPV18 DNA CVX QL NAA+PROBE: NEGATIVE

## 2023-03-12 PROBLEM — N30.01 ACUTE CYSTITIS WITH HEMATURIA: Status: RESOLVED | Noted: 2023-01-11 | Resolved: 2023-03-12

## 2023-03-15 ENCOUNTER — TELEPHONE (OUTPATIENT)
Dept: OBGYN CLINIC | Facility: CLINIC | Age: 28
End: 2023-03-15

## 2023-03-15 NOTE — TELEPHONE ENCOUNTER
The patient called and is requested a sooner appointment for her Colpo, states how are they making her wait until June when she was told she can possibly have cancer per her pap results  States that this is ridiculous on how long the wait is and if there is another provider that she could go to for the procedure  Please advise

## 2023-03-16 ENCOUNTER — HOSPITAL ENCOUNTER (OUTPATIENT)
Dept: MRI IMAGING | Facility: HOSPITAL | Age: 28
Discharge: HOME | End: 2023-03-16

## 2023-03-16 DIAGNOSIS — G43.709 CHRONIC MIGRAINE WITHOUT AURA WITHOUT STATUS MIGRAINOSUS, NOT INTRACTABLE: ICD-10-CM

## 2023-03-20 ENCOUNTER — TELEPHONE (OUTPATIENT)
Dept: NEUROLOGY | Facility: CLINIC | Age: 28
End: 2023-03-20

## 2023-03-29 ENCOUNTER — OFFICE VISIT (OUTPATIENT)
Dept: NEUROLOGY | Facility: CLINIC | Age: 28
End: 2023-03-29

## 2023-03-29 VITALS
DIASTOLIC BLOOD PRESSURE: 82 MMHG | OXYGEN SATURATION: 97 % | WEIGHT: 238 LBS | HEART RATE: 100 BPM | TEMPERATURE: 97.3 F | HEIGHT: 66 IN | BODY MASS INDEX: 38.25 KG/M2 | SYSTOLIC BLOOD PRESSURE: 114 MMHG

## 2023-03-29 DIAGNOSIS — M54.2 CHRONIC NECK PAIN: ICD-10-CM

## 2023-03-29 DIAGNOSIS — G43.709 CHRONIC MIGRAINE WITHOUT AURA WITHOUT STATUS MIGRAINOSUS, NOT INTRACTABLE: Primary | ICD-10-CM

## 2023-03-29 DIAGNOSIS — G89.29 CHRONIC NECK PAIN: ICD-10-CM

## 2023-03-29 NOTE — ASSESSMENT & PLAN NOTE
Hx chronic neck pain x 1 year  Appears to be myofacial  Denies relief with Tizanidine  Will send her for a xray of her neck  Referral to physical therapy  Consider OTC Voltaren gel as needed

## 2023-03-29 NOTE — ASSESSMENT & PLAN NOTE
Chet Shabazz is a 32year old female with chronic migraine headaches and milder headaches that seem to have a cervicogenic component but could also be tension type  She has noticed more chronic neck pain associated with these headaches; mildly better after seeing her chiropractor- seen every 4 weeks or so  She also reports a decrease in the frequency of her migraines to now 2-3 a month, improved from prior frequency of 3-4/month  She is not currently pregnant, but is still actively trying  She recently (2 months ago) discontinued Wellbutrin , Zoloft 100 mg, Lamictal 25 mg and Vistaril 25 mg prn for PTSD, anxiety and depression due to plans for pregnancy  Her exam is excellent and there are no focal, motor or sensory neurologic deficits  We discussed as she is actively trying to become pregnant that her treatment options may be limited  She failed Cyproheptadine, an alternative would be Nurtec that she could use as a preventative or abortive for now and then discontinue once she has a confirmed pregnancy test  However, she would prefer to defer starting another medication at this time as she has plans to get pregnant this month or see a fertility specialist next month  We discussed other healthy lifestyle measures and natural alternative treatments (accupuncture, CBT, Biofeedback etc) that may also improve her headaches  Given the cervicogenic component of her headaches, we will have her obtain a xray of her neck and have referred her to physical therapy  She may also consider OTC Voltaren gel as needed  Plan as outlined below          Plan:    - Stop Cyproheptadine, Rizatriptan and Reglan  - Continue Excedrin prn  - Xray cervical spine  - Physical therapy  - Voltaren gel as needed (OTC)  - Consider acupuncture, massage, cognitive behavior therapy, biofeedback therapy, Gammacore, Cefaly or Nerivio  - Follow up in 4 months; sooner if needed

## 2023-03-29 NOTE — PATIENT INSTRUCTIONS
- Stop Cyproheptadine, Rizatriptan and Reglan  - Continue Excedrin prn  - Xray cervical spine  - Physical therapy  - Voltaren gel as needed (OTC)  - Consider acupuncture, massage, cognitive behavior therapy, biofeedback therapy, Gammacore, Cefaly or Nerivio  - Follow up in 4 months; sooner if needed

## 2023-03-29 NOTE — PROGRESS NOTES
Patient ID: Yohana Lacy is a 32 y o  female  Assessment/Plan:    Chronic migraine without aura without status migrainosus, not intractable  Yohana Lacy is a 32year old female with chronic migraine headaches and milder headaches that seem to have a cervicogenic component but could also be tension type  She has noticed more chronic neck pain associated with these headaches; mildly better after seeing her chiropractor- seen every 4 weeks or so  She also reports a decrease in the frequency of her migraines to now 2-3 a month, improved from prior frequency of 3-4/month  She is not currently pregnant, but is still actively trying  She recently (2 months ago) discontinued Wellbutrin , Zoloft 100 mg, Lamictal 25 mg and Vistaril 25 mg prn for PTSD, anxiety and depression due to plans for pregnancy  Her exam is excellent and there are no focal, motor or sensory neurologic deficits  We discussed as she is actively trying to become pregnant that her treatment options may be limited  She failed Cyproheptadine, an alternative would be Nurtec that she could use as a preventative or abortive for now and then discontinue once she has a confirmed pregnancy test  However, she would prefer to defer starting another medication at this time as she has plans to get pregnant this month or see a fertility specialist next month  We discussed other healthy lifestyle measures and natural alternative treatments (accupuncture, CBT, Biofeedback etc) that may also improve her headaches  Given the cervicogenic component of her headaches, we will have her obtain a xray of her neck and have referred her to physical therapy  She may also consider OTC Voltaren gel as needed  Plan as outlined below          Plan:    - Stop Cyproheptadine, Rizatriptan and Reglan  - Continue Excedrin prn  - Xray cervical spine  - Physical therapy  - Voltaren gel as needed (OTC)  - Consider acupuncture, massage, cognitive behavior therapy, biofeedback therapy, Gammacore, Cefaly or Nerivio  - Follow up in 4 months; sooner if needed     Chronic neck pain  Hx chronic neck pain x 1 year  Appears to be myofacial  Denies relief with Tizanidine  Will send her for a xray of her neck  Referral to physical therapy  Consider OTC Voltaren gel as needed       Diagnoses and all orders for this visit:    Chronic migraine without aura without status migrainosus, not intractable    Chronic neck pain  -     XR spine cervical 2 or 3 vw injury; Future  -     Ambulatory referral to Physical Therapy; Future         I have spent a total time of 40 minutes on 03/29/23 in caring for this patient including Diagnostic results, Risks and benefits of tx options, Instructions for management, Impressions, Counseling / Coordination of care, Documenting in the medical record, Reviewing / ordering tests, medicine, procedures   and Obtaining or reviewing history    Subjective:    AJIT Acuna is a 32year old female with a pmhx of asthma, allergies, POTS syndrome, neuropathy, PTSD, endometriosis and obesity who presents to the office today to follow up on her migraine headaches and milder tension type headaches  She was last seen 12/28/22, and at that time was actively trying to conceive a pregnancy therefore was only started on Cyproheptadine 8 mg at bedtime, and Rizatriptan+Reglan as needed at the onset of a migraine  She returns today and denies any changes in her tension type headaches, with the exception that she has noticed more chronic neck pain associated with these headaches; mildly better after seeing her chiropractor- seen every 4 weeks or so  She also reports a decrease in the frequency of her migraines to now 2-3 a month, improved from prior frequency of 3-4/month  She is not currently pregnant, but is still actively trying   She recently (2 months ago) discontinued Wellbutrin , Zoloft 100 mg, Lamictal 25 mg and Vistaril 25 mg prn for PTSD, anxiety "and depression due to plans for pregnancy  Updates to her hx in red below     \"Tension Type\" Headaches  First developed in teens, worse in the last 1 year  Daily in frequency  Mostly occipital area with tension in her neck, sometimes behind the eyes  Worse with Anxiety  Described as heavy, pressure, and \"haziness\", dizziness at times  4-6/10 on pain scale  Tiger balm helps to some degree; not using any other OTCs  Not any worse at any particular time of day  She has noticed more chronic neck pain associated with these headaches; mildly better after seeing her chiropractor- seen every 4 weeks or so      Migraine headaches  First developed around 12years of age  Migraines are now 2-3 a month, improved from prior frequency of 3-4/month; duration can vary 2-9 days   Photophobia, phonophobia, nausea; denies any systemic symptoms  Usually right sided, parietal/temporal; can radiate to the entire head \"achiness\"  Pulsating, sharp stabbing; did have one recent migraine where the stabbing pain was so severe it caused her right sided head, neck and shoulder twitching  9/10 on pain scale  Uses Excedrin, can reduce to 7/10 but does not resolve it  Tried Rizatriptan + Reglan and states it \"barely takes the edge off\"  She has also used Tizanidine prescribed by her PCP with no relief  No Aura  Gradual build up; no particular time of day  No specific triggers; if POTS is worse then headaches are worse that day  Awaken from sleep?: no  Seasonal pattern?: no   'Clustering' of HA's over time? 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 yes - 2 concussions in the past (8 and 15)  History of head/neck surgery? no  Family h/o headache problems? yes - mom, 2 maternal aunts  Family history of aneurysms? no  Exposure to carbon monoxide? no  Substance use: " "alcohol: socially, caffeine: 16 oz coffee qod  Water: 48-60 oz  Diet: she often skips breakfast, she tries to eat 2 meals a day and does snack through out the day  Sleep: about 6-7 hours a night   Mood: PTSD, Anxiety, Depression- she feels is \"stable\"  She is Actively trying to conceive a pregnancy      Current Use of Meds to Treat HA:  Abortive meds? Excedrin, Tiger balm  Daily use? no  Preventive meds? None; she recently (2 months ago) discontinued Wellbutrin , Zoloft 100 mg and Vistaril 25 mg prn for PTSD, anxiety and depression  She has tried cyproheptadine 8 mg at bedtime with no benefit   Non-Medical/Alternative treatments for HA: Chiropractor- last seen a few weeks ago      Prior Evaluation/Treatments:  Have you seen another physician for your headaches? no  Prior work-up: none; Pending labs- CBC/CMP/Lyme ab/ Sed rate  Trigger point injections? no  Botox injections? no  Epidural injections? No     Prior PREVENTATIVE medications: none   Prior ABORTIVE mediations: Tylenol- ineffective, has received a migraine cocktail in the ER in the past which was effective  Fioricet- ineffective  Prednisone- did help some but did completely resolve headache  Sumatriptan- ineffective  Zofran- helped with nausea but caused dizziness  Tizanidine- helped her sleep but did not help headache or muscle tension  Cyproheptadine 8 mg- ineffective  Rizatriptan- ineffective  Reglan- ineffective     Has not yet tried: acupuncture, massage, cognitive behavior therapy, biofeedback therapy, Gammacore, Cefaly or Nerivio    The following portions of the patient's history were reviewed and updated as appropriate: allergies, current medications, past family history, past medical history, past social history and past surgical history      Objective:    Blood pressure 114/82, pulse 100, temperature (!) 97 3 °F (36 3 °C), temperature source Temporal, height 5' 6\" (1 676 m), weight 108 kg (238 lb), SpO2 97 %, not currently " breastfeeding  Neurological Exam    On neurological examination patient is alert, awake, oriented and in no distress  Speech is fluent without dysarthria or aphasia  Cranial nerves 2-12 were symmetrically intact bilaterally  No evidence of any focal weakness or sensory loss in the upper or lower extremities  Motor testing reveals 5/5 strength of the bilateral upper and lower extremities  There was no pronator drift  No fasciculations present  No abnormal involuntary movements  Finger- to-nose reveals no tremor or ataxia and intact proprioceptive function, no dysmetria was noted  Rapid alternating movement normal  Sensation was intact to vibration, light touch, and temperature in bilateral upper and lower extremities  Deep tendon reflexes were 2+ and symmetric in the bilateral upper and lower extremities  She is able to rise easily without assistance from a seated position  Casual gait is normal including stance, stride, and arm swing  ROS:    Review of Systems   Constitutional: Negative  Negative for appetite change and fever  HENT: Negative  Negative for hearing loss, tinnitus, trouble swallowing and voice change  Eyes: Negative  Negative for photophobia, pain and visual disturbance  Respiratory: Negative  Negative for shortness of breath  Cardiovascular: Negative  Negative for palpitations  Gastrointestinal: Negative  Negative for nausea and vomiting  Endocrine: Negative  Negative for cold intolerance  Genitourinary: Negative  Negative for dysuria, frequency and urgency  Musculoskeletal: Negative  Negative for gait problem, myalgias and neck pain  Skin: Negative  Negative for rash  Allergic/Immunologic: Negative  Neurological: Positive for headaches  Negative for dizziness, tremors, seizures, syncope, facial asymmetry, speech difficulty, weakness, light-headedness and numbness  Hematological: Negative  Does not bruise/bleed easily  Psychiatric/Behavioral: Negative  Negative for confusion, hallucinations and sleep disturbance  Reviewed ROS as entered by medical assistant

## 2023-05-09 ENCOUNTER — OFFICE VISIT (OUTPATIENT)
Dept: FAMILY MEDICINE CLINIC | Facility: CLINIC | Age: 28
End: 2023-05-09

## 2023-05-09 VITALS
BODY MASS INDEX: 38.25 KG/M2 | SYSTOLIC BLOOD PRESSURE: 108 MMHG | WEIGHT: 238 LBS | OXYGEN SATURATION: 98 % | HEIGHT: 66 IN | HEART RATE: 85 BPM | DIASTOLIC BLOOD PRESSURE: 72 MMHG

## 2023-05-09 DIAGNOSIS — J30.2 SEASONAL ALLERGIC RHINITIS, UNSPECIFIED TRIGGER: ICD-10-CM

## 2023-05-09 DIAGNOSIS — G43.709 CHRONIC MIGRAINE WITHOUT AURA WITHOUT STATUS MIGRAINOSUS, NOT INTRACTABLE: ICD-10-CM

## 2023-05-09 DIAGNOSIS — J45.40 MODERATE PERSISTENT ASTHMA WITHOUT COMPLICATION: Primary | ICD-10-CM

## 2023-05-09 RX ORDER — PREDNISONE 10 MG/1
TABLET ORAL
Qty: 30 TABLET | Refills: 0 | Status: SHIPPED | OUTPATIENT
Start: 2023-05-09 | End: 2023-05-19

## 2023-05-09 NOTE — LETTER
May 9, 2023     Patient: Lana Henao  YOB: 1995  Date of Visit: 5/9/2023      To Whom it May Concern:    Lana Henao is under my professional care  Hannah Sparks was seen in my office on 5/9/2023  Hannah Sparks may return to work on 5/10/2023  If you have any questions or concerns, please don't hesitate to call           Sincerely,          Terry Duarte PA-C        CC: No Recipients

## 2023-05-09 NOTE — PATIENT INSTRUCTIONS
Assessment/plan:  1  Acute exacerbation of asthma-recommend prednisone 10 mg, 5 tablets daily for 2 days, then 4 tablets daily for 2 days, then 3 tablets daily for 2 days, then 2 tablets daily for 2 days, then 1 tablet daily for 2 days  Thirty pills with no refills if symptoms persist consider controller inhaler such as Symbicort or Dulera  Peak flow testing is within normal limits however  Patient did get 510 out of expected 450  Consider further pulmonary function testing and chest x-ray imaging if persistent as well  2   Allergic rhinitis-exacerbated  Recommend continuing over-the-counter medications and prednisone treatment as above  3   Chronic migraine headache-presently stable, no medication changes

## 2023-05-09 NOTE — PROGRESS NOTES
Name: Yoni Andrew      : 1995      MRN: [id number]  Encounter Provider: Malachy Bloch, PA-C  Encounter Date: 2023   Encounter department: [facility]    Assessment & Plan      Patient Instructions   Assessment/plan:  1  Acute exacerbation of asthma-recommend prednisone 10 mg, 5 tablets daily for 2 days, then 4 tablets daily for 2 days, then 3 tablets daily for 2 days, then 2 tablets daily for 2 days, then 1 tablet daily for 2 days  Thirty pills with no refills if symptoms persist consider controller inhaler such as Symbicort or Dulera  Peak flow testing is within normal limits however  Patient did get 510 out of expected 450  Consider further pulmonary function testing and chest x-ray imaging if persistent as well  2   Allergic rhinitis-exacerbated  Recommend continuing over-the-counter medications and prednisone treatment as above  3   Chronic migraine headache-presently stable, no medication changes  1  Moderate persistent asthma without complication  -     predniSONE 10 mg tablet; 5,5,4,4,3,3,2,2,1,1    2  Seasonal allergic rhinitis, unspecified trigger    3  Chronic migraine without aura without status migrainosus, not intractable         Subjective      HPI: This is a 26-year-old female that presents to the office with a history of  asthma and complains of increasing exacerbating symptoms  Review of Systems   Constitutional: Negative for chills, fatigue and fever  HENT: Negative for congestion, ear pain and sinus pressure  Eyes: Negative for visual disturbance  Respiratory: Positive for cough, chest tightness, shortness of breath and wheezing  Cardiovascular: Negative for chest pain and palpitations  Gastrointestinal: Negative for diarrhea, nausea and vomiting  Endocrine: Negative for polyuria  Genitourinary: Negative for dysuria and frequency  Musculoskeletal: Negative for arthralgias and myalgias     Skin: Negative for pallor and "rash    Neurological: Negative for dizziness, weakness, light-headedness, numbness and headaches  Psychiatric/Behavioral: Negative for agitation, behavioral problems and sleep disturbance  All other systems reviewed and are negative  Current Outpatient Medications on File Prior to Visit   Medication Sig   • albuterol (2 5 mg/3 mL) 0 083 % nebulizer solution Take 3 mL (2 5 mg total) by nebulization every 6 (six) hours as needed for wheezing or shortness of breath   • albuterol (ProAir HFA) 90 mcg/act inhaler Inhale 2 puffs every 6 (six) hours as needed for wheezing   • aspirin-acetaminophen-caffeine (EXCEDRIN MIGRAINE) 250-250-65 MG per tablet Take 1 tablet by mouth as needed for headaches   • Prenatal MV-Min-FA-Omega-3 (PRENATAL GUMMIES/DHA & FA PO) Take by mouth   • fluticasone (FLONASE) 50 mcg/act nasal spray 2 sprays into each nostril daily   • ipratropium (ATROVENT) 0 02 % nebulizer solution Take 2 5 mL (0 5 mg total) by nebulization every 6 (six) hours as needed for wheezing or shortness of breath       Objective     /72 (BP Location: Left arm, Patient Position: Sitting, Cuff Size: Large)   Pulse 85   Ht 5' 6\" (1 676 m)   Wt 108 kg (238 lb)   SpO2 98%   BMI 38 41 kg/m²     Physical Exam  Vitals and nursing note reviewed  Constitutional:       General: She is not in acute distress  Appearance: She is well-developed  HENT:      Head: Normocephalic and atraumatic  Right Ear: External ear normal       Left Ear: External ear normal       Nose: Nose normal       Mouth/Throat:      Pharynx: No oropharyngeal exudate  Eyes:      Conjunctiva/sclera: Conjunctivae normal       Pupils: Pupils are equal, round, and reactive to light  Neck:      Thyroid: No thyromegaly  Trachea: No tracheal deviation  Cardiovascular:      Rate and Rhythm: Normal rate and regular rhythm  Heart sounds: Normal heart sounds  No murmur heard  No friction rub     Pulmonary:      Effort: Pulmonary " effort is normal  No respiratory distress  Breath sounds: Wheezing present  No rales  Abdominal:      General: Bowel sounds are normal  There is no distension  Palpations: Abdomen is soft  Tenderness: There is no abdominal tenderness  There is no guarding or rebound  Musculoskeletal:         General: No tenderness  Normal range of motion  Cervical back: Normal range of motion and neck supple  Lymphadenopathy:      Cervical: No cervical adenopathy  Skin:     General: Skin is warm and dry  Findings: No erythema or rash  Neurological:      Mental Status: She is alert and oriented to person, place, and time  Cranial Nerves: No cranial nerve deficit  Coordination: Coordination normal    Psychiatric:         Behavior: Behavior normal          Thought Content:  Thought content normal        Yara Mcintyre PA-C

## 2023-05-10 ENCOUNTER — HOSPITAL ENCOUNTER (EMERGENCY)
Facility: HOSPITAL | Age: 28
Discharge: HOME | End: 2023-05-10
Attending: EMERGENCY MEDICINE | Admitting: EMERGENCY MEDICINE

## 2023-05-10 ENCOUNTER — APPOINTMENT (EMERGENCY)
Dept: RADIOLOGY | Facility: HOSPITAL | Age: 28
End: 2023-05-10

## 2023-05-10 VITALS
OXYGEN SATURATION: 99 % | RESPIRATION RATE: 33 BRPM | HEART RATE: 102 BPM | DIASTOLIC BLOOD PRESSURE: 75 MMHG | TEMPERATURE: 98.5 F | SYSTOLIC BLOOD PRESSURE: 138 MMHG

## 2023-05-10 DIAGNOSIS — J20.9 ACUTE BRONCHITIS: Primary | ICD-10-CM

## 2023-05-10 DIAGNOSIS — R05.9 COUGH: ICD-10-CM

## 2023-05-10 DIAGNOSIS — J30.2 SEASONAL ALLERGIC RHINITIS, UNSPECIFIED TRIGGER: ICD-10-CM

## 2023-05-10 LAB
ALBUMIN SERPL BCP-MCNC: 4.5 G/DL (ref 3.5–5)
ALP SERPL-CCNC: 84 U/L (ref 34–104)
ALT SERPL W P-5'-P-CCNC: 31 U/L (ref 7–52)
ANION GAP SERPL CALCULATED.3IONS-SCNC: 9 MMOL/L (ref 4–13)
AST SERPL W P-5'-P-CCNC: 23 U/L (ref 13–39)
BASOPHILS # BLD AUTO: 0.03 THOUSANDS/ÂΜL (ref 0–0.1)
BASOPHILS NFR BLD AUTO: 0 % (ref 0–1)
BILIRUB SERPL-MCNC: 0.19 MG/DL (ref 0.2–1)
BUN SERPL-MCNC: 14 MG/DL (ref 5–25)
CALCIUM SERPL-MCNC: 9.5 MG/DL (ref 8.4–10.2)
CHLORIDE SERPL-SCNC: 107 MMOL/L (ref 96–108)
CO2 SERPL-SCNC: 22 MMOL/L (ref 21–32)
CREAT SERPL-MCNC: 0.69 MG/DL (ref 0.6–1.3)
EOSINOPHIL # BLD AUTO: 0 THOUSAND/ÂΜL (ref 0–0.61)
EOSINOPHIL NFR BLD AUTO: 0 % (ref 0–6)
ERYTHROCYTE [DISTWIDTH] IN BLOOD BY AUTOMATED COUNT: 12.8 % (ref 11.6–15.1)
GFR SERPL CREATININE-BSD FRML MDRD: 119 ML/MIN/1.73SQ M
GLUCOSE SERPL-MCNC: 159 MG/DL (ref 65–140)
HCT VFR BLD AUTO: 39.7 % (ref 34.8–46.1)
HGB BLD-MCNC: 13.3 G/DL (ref 11.5–15.4)
IMM GRANULOCYTES # BLD AUTO: 0.19 THOUSAND/UL (ref 0–0.2)
IMM GRANULOCYTES NFR BLD AUTO: 1 % (ref 0–2)
LYMPHOCYTES # BLD AUTO: 1.06 THOUSANDS/ÂΜL (ref 0.6–4.47)
LYMPHOCYTES NFR BLD AUTO: 7 % (ref 14–44)
MCH RBC QN AUTO: 29.8 PG (ref 26.8–34.3)
MCHC RBC AUTO-ENTMCNC: 33.5 G/DL (ref 31.4–37.4)
MCV RBC AUTO: 89 FL (ref 82–98)
MONOCYTES # BLD AUTO: 0.44 THOUSAND/ÂΜL (ref 0.17–1.22)
MONOCYTES NFR BLD AUTO: 3 % (ref 4–12)
NEUTROPHILS # BLD AUTO: 13.16 THOUSANDS/ÂΜL (ref 1.85–7.62)
NEUTS SEG NFR BLD AUTO: 89 % (ref 43–75)
NRBC BLD AUTO-RTO: 0 /100 WBCS
PLATELET # BLD AUTO: 364 THOUSANDS/UL (ref 149–390)
PMV BLD AUTO: 9.2 FL (ref 8.9–12.7)
POTASSIUM SERPL-SCNC: 4.2 MMOL/L (ref 3.5–5.3)
PROT SERPL-MCNC: 8 G/DL (ref 6.4–8.4)
RBC # BLD AUTO: 4.47 MILLION/UL (ref 3.81–5.12)
SODIUM SERPL-SCNC: 138 MMOL/L (ref 135–147)
WBC # BLD AUTO: 14.88 THOUSAND/UL (ref 4.31–10.16)

## 2023-05-10 RX ORDER — AMOXICILLIN AND CLAVULANATE POTASSIUM 875; 125 MG/1; MG/1
1 TABLET, FILM COATED ORAL EVERY 12 HOURS
Qty: 14 TABLET | Refills: 0 | Status: SHIPPED | OUTPATIENT
Start: 2023-05-10 | End: 2023-05-17

## 2023-05-10 RX ORDER — ALBUTEROL SULFATE 2.5 MG/3ML
2.5 SOLUTION RESPIRATORY (INHALATION) EVERY 6 HOURS PRN
Qty: 90 ML | Refills: 0 | Status: SHIPPED | OUTPATIENT
Start: 2023-05-10

## 2023-05-10 RX ORDER — HYDROCODONE POLISTIREX AND CHLORPHENIRAMINE POLISTIREX 10; 8 MG/5ML; MG/5ML
5 SUSPENSION, EXTENDED RELEASE ORAL EVERY 12 HOURS PRN
Qty: 115 ML | Refills: 0 | Status: SHIPPED | OUTPATIENT
Start: 2023-05-10 | End: 2023-05-20

## 2023-05-10 RX ORDER — AMOXICILLIN AND CLAVULANATE POTASSIUM 875; 125 MG/1; MG/1
1 TABLET, FILM COATED ORAL ONCE
Status: COMPLETED | OUTPATIENT
Start: 2023-05-10 | End: 2023-05-10

## 2023-05-10 RX ORDER — ALBUTEROL SULFATE 2.5 MG/3ML
5 SOLUTION RESPIRATORY (INHALATION) ONCE
Status: COMPLETED | OUTPATIENT
Start: 2023-05-10 | End: 2023-05-10

## 2023-05-10 RX ORDER — HYDROCODONE POLISTIREX AND CHLORPHENIRAMINE POLISTIREX 10; 8 MG/5ML; MG/5ML
5 SUSPENSION, EXTENDED RELEASE ORAL EVERY 12 HOURS PRN
Status: DISCONTINUED | OUTPATIENT
Start: 2023-05-10 | End: 2023-05-11 | Stop reason: HOSPADM

## 2023-05-10 RX ORDER — ALBUTEROL SULFATE 2.5 MG/3ML
2.5 SOLUTION RESPIRATORY (INHALATION) ONCE
Status: DISCONTINUED | OUTPATIENT
Start: 2023-05-10 | End: 2023-05-10

## 2023-05-10 RX ORDER — MAGNESIUM SULFATE HEPTAHYDRATE 40 MG/ML
2 INJECTION, SOLUTION INTRAVENOUS ONCE
Status: COMPLETED | OUTPATIENT
Start: 2023-05-10 | End: 2023-05-10

## 2023-05-10 RX ADMIN — MAGNESIUM SULFATE HEPTAHYDRATE 2 G: 40 INJECTION, SOLUTION INTRAVENOUS at 21:16

## 2023-05-10 RX ADMIN — IPRATROPIUM BROMIDE 1 MG: 0.5 SOLUTION RESPIRATORY (INHALATION) at 21:14

## 2023-05-10 RX ADMIN — HYDROCODONE POLISTIREX AND CHLORPHENIRAMINE POLISTIREX 5 ML: 10; 8 SUSPENSION, EXTENDED RELEASE ORAL at 22:36

## 2023-05-10 RX ADMIN — ALBUTEROL SULFATE 5 MG: 2.5 SOLUTION RESPIRATORY (INHALATION) at 21:14

## 2023-05-10 RX ADMIN — AMOXICILLIN AND CLAVULANATE POTASSIUM 1 TABLET: 875; 125 TABLET, FILM COATED ORAL at 22:36

## 2023-05-11 LAB
ATRIAL RATE: 108 BPM
P AXIS: 64 DEGREES
PR INTERVAL: 126 MS
QRS AXIS: 66 DEGREES
QRSD INTERVAL: 86 MS
QT INTERVAL: 300 MS
QTC INTERVAL: 402 MS
T WAVE AXIS: 63 DEGREES
VENTRICULAR RATE: 108 BPM

## 2023-05-11 NOTE — DISCHARGE INSTRUCTIONS
Prescriptions were sent to the pharmacy for cough medicine and antibiotics  Follow-up with the primary care doctor  Return to the emergency department if symptoms worsen or if you have any other concerns

## 2023-05-11 NOTE — ED PROVIDER NOTES
History  Chief Complaint   Patient presents with   • Shortness of Breath     Coming in from home, SOB since Thursday, increased cough, went to dr bradley, exacerbation from allergies, 50 mg of steroids taken today and yesterday, albuterol and atrovent alternating q3,      HPI  Sherren Goad is a 32 y o  female with PMH asthma who presents to the emergency department with shortness of breath and cough  She has been having cough for several months, over the past week has had worsening shortness of breath  She saw the PCP yesterday who began a course of steroids, today is day 2 of prednisone  She has also been using nebulizers at home  She has had productive cough but denies fevers  Prior to Admission Medications   Prescriptions Last Dose Informant Patient Reported? Taking?    Prenatal MV-Min-FA-Omega-3 (PRENATAL GUMMIES/DHA & FA PO)   Yes No   Sig: Take by mouth   albuterol (2 5 mg/3 mL) 0 083 % nebulizer solution   No No   Sig: Take 3 mL (2 5 mg total) by nebulization every 6 (six) hours as needed for wheezing or shortness of breath   albuterol (ProAir HFA) 90 mcg/act inhaler   No No   Sig: Inhale 2 puffs every 6 (six) hours as needed for wheezing   aspirin-acetaminophen-caffeine (EXCEDRIN MIGRAINE) 250-250-65 MG per tablet   Yes No   Sig: Take 1 tablet by mouth as needed for headaches   fluticasone (FLONASE) 50 mcg/act nasal spray   No No   Si sprays into each nostril daily   ipratropium (ATROVENT) 0 02 % nebulizer solution   No No   Sig: Take 2 5 mL (0 5 mg total) by nebulization every 6 (six) hours as needed for wheezing or shortness of breath   predniSONE 10 mg tablet   No No   Si,5,4,4,3,3,2,2,1,1      Facility-Administered Medications: None       Past Medical History:   Diagnosis Date   • Allergic rhinitis    • Asthma    • Cyst of right ovary 2022   • Endometriosis    • Intractable migraine with status migrainosus 10/8/2022   • POTS (postural orthostatic tachycardia syndrome)    • POTS (postural orthostatic tachycardia syndrome)    • S/P D&C (status post dilation and curettage) 03/13/2022   • S/P ovarian cystectomy 03/13/2022       Past Surgical History:   Procedure Laterality Date   • DILATION AND CURETTAGE OF UTERUS N/A 03/08/2022    Procedure: DILATATION AND CURETTAGE (D&C); Surgeon: Epifanio Salter MD;  Location: [facility];  Service: Gynecology   • LAPAROSCOPIC ENDOMETRIOSIS FULGURATION  03/03/2021   • AL LAPS ABD PRTM&OMENTUM DX W/WO SPEC BR/WA SPX N/A 03/08/2022    Procedure: LAPAROSCOPY DIAGNOSTIC, right ovarian cystectomy;  Surgeon: Epifanio Salter MD;  Location[facility];  Service: Gynecology       Family History   Problem Relation Age of Onset   • Stroke Maternal Grandmother    • Leukemia Maternal Grandmother    • Heart attack Maternal Grandmother      I have reviewed and agree with the history as documented  E-Cigarette/Vaping   • E-Cigarette Use Never User      E-Cigarette/Vaping Substances   • Nicotine No    • THC No    • CBD No    • Flavoring No    • Other No    • Unknown No      Social History     Tobacco Use   • Smoking status: Never   • Smokeless tobacco: Never   Vaping Use   • Vaping Use: Never used   Substance Use Topics   • Alcohol use: Yes     Comment: socially    • Drug use: Never       Home medications:  Prior to Admission Medications   Prescriptions Last Dose Informant Patient Reported? Taking?    Prenatal MV-Min-FA-Omega-3 (PRENATAL GUMMIES/DHA & FA PO)   Yes No   Sig: Take by mouth   albuterol (2 5 mg/3 mL) 0 083 % nebulizer solution   No No   Sig: Take 3 mL (2 5 mg total) by nebulization every 6 (six) hours as needed for wheezing or shortness of breath   albuterol (ProAir HFA) 90 mcg/act inhaler   No No   Sig: Inhale 2 puffs every 6 (six) hours as needed for wheezing   aspirin-acetaminophen-caffeine (EXCEDRIN MIGRAINE) 250-250-65 MG per tablet   Yes No   Sig: Take 1 tablet by mouth as needed for headaches   fluticasone (FLONASE) 50 mcg/act nasal spray   No No   Sig: 2 sprays into each nostril daily   ipratropium (ATROVENT) 0 02 % nebulizer solution   No No   Sig: Take 2 5 mL (0 5 mg total) by nebulization every 6 (six) hours as needed for wheezing or shortness of breath   predniSONE 10 mg tablet   No No   Si,5,4,4,3,3,2,2,1,1      Facility-Administered Medications: None     Allergies:  No Known Allergies     Review of Systems   Constitutional: Negative for fever  Respiratory: Positive for cough, shortness of breath and wheezing  Cardiovascular: Negative for chest pain and leg swelling  Gastrointestinal: Negative for abdominal pain  All other systems reviewed and are negative  Physical Exam  ED Triage Vitals   Temperature Pulse Respirations Blood Pressure SpO2   05/10/23 2105 05/10/23 2056 05/10/23 2056 05/10/23 2056 05/10/23 2056   98 5 °F (36 9 °C) (!) 123 20 141/76 97 %      Temp Source Heart Rate Source Patient Position - Orthostatic VS BP Location FiO2 (%)   05/10/23 2105 05/10/23 2056 05/10/23 2056 05/10/23 2056 --   Oral Monitor Lying Right arm       Pain Score       05/10/23 2217       No Pain             Orthostatic Vital Signs  Vitals:    05/10/23 2056 05/10/23 2217   BP: 141/76 138/75   Pulse: (!) 123 102   Patient Position - Orthostatic VS: Lying Lying       Physical Exam  Vitals and nursing note reviewed  Constitutional:       General: She is not in acute distress  Appearance: She is not toxic-appearing  HENT:      Head: Normocephalic  Mouth/Throat:      Mouth: Mucous membranes are moist       Pharynx: Oropharynx is clear  Eyes:      Pupils: Pupils are equal, round, and reactive to light  Cardiovascular:      Rate and Rhythm: Regular rhythm  Tachycardia present  Heart sounds: No murmur heard  Pulmonary:      Effort: No respiratory distress  Breath sounds: Wheezing present  No rhonchi or rales  Comments: Mild tachypnea but talking in complete sentences  Abdominal:      General: Abdomen is flat   There is no distension  Palpations: Abdomen is soft  Tenderness: There is no abdominal tenderness  There is no guarding or rebound  Musculoskeletal:      Right lower leg: No edema  Left lower leg: No edema  Skin:     General: Skin is warm and dry  Neurological:      Mental Status: She is alert           ED Medications  Medications   Hydrocod Osiel-Chlorphe Osiel ER (TUSSIONEX) ER suspension 5 mL (5 mL Oral Given 5/10/23 2236)   albuterol inhalation solution 5 mg (5 mg Nebulization Given 5/10/23 2114)   ipratropium (ATROVENT) 0 02 % inhalation solution 1 mg (1 mg Nebulization Given 5/10/23 2114)   magnesium sulfate 2 g/50 mL IVPB (premix) 2 g (0 g Intravenous Stopped 5/10/23 2216)   amoxicillin-clavulanate (AUGMENTIN) 875-125 mg per tablet 1 tablet (1 tablet Oral Given 5/10/23 2236)       Diagnostic Studies  Results Reviewed     Procedure Component Value Units Date/Time    Comprehensive metabolic panel [[id number]]  (Abnormal) Collected: 05/10/23 2111    Lab Status: Final result Specimen: Blood from Arm, Right Updated: 05/10/23 2133     Sodium 138 mmol/L      Potassium 4 2 mmol/L      Chloride 107 mmol/L      CO2 22 mmol/L      ANION GAP 9 mmol/L      BUN 14 mg/dL      Creatinine 0 69 mg/dL      Glucose 159 mg/dL      Calcium 9 5 mg/dL      AST 23 U/L      ALT 31 U/L      Alkaline Phosphatase 84 U/L      Total Protein 8 0 g/dL      Albumin 4 5 g/dL      Total Bilirubin 0 19 mg/dL      eGFR 119 ml/min/1 73sq m     Narrative:      Kenzie guidelines for Chronic Kidney Disease (CKD):   •  Stage 1 with normal or high GFR (GFR > 90 mL/min/1 73 square meters)  •  Stage 2 Mild CKD (GFR = 60-89 mL/min/1 73 square meters)  •  Stage 3A Moderate CKD (GFR = 45-59 mL/min/1 73 square meters)  •  Stage 3B Moderate CKD (GFR = 30-44 mL/min/1 73 square meters)  •  Stage 4 Severe CKD (GFR = 15-29 mL/min/1 73 square meters)  •  Stage 5 End Stage CKD (GFR <15 mL/min/1 73 square meters)  Note: GFR calculation is accurate only with a steady state creatinine    CBC and differential [[id number]]  (Abnormal) Collected: 05/10/23 2111    Lab Status: Final result Specimen: Blood from Arm, Right Updated: 05/10/23 2116     WBC 14 88 Thousand/uL      RBC 4 47 Million/uL      Hemoglobin 13 3 g/dL      Hematocrit 39 7 %      MCV 89 fL      MCH 29 8 pg      MCHC 33 5 g/dL      RDW 12 8 %      MPV 9 2 fL      Platelets 014 Thousands/uL      nRBC 0 /100 WBCs      Neutrophils Relative 89 %      Immat GRANS % 1 %      Lymphocytes Relative 7 %      Monocytes Relative 3 %      Eosinophils Relative 0 %      Basophils Relative 0 %      Neutrophils Absolute 13 16 Thousands/µL      Immature Grans Absolute 0 19 Thousand/uL      Lymphocytes Absolute 1 06 Thousands/µL      Monocytes Absolute 0 44 Thousand/µL      Eosinophils Absolute 0 00 Thousand/µL      Basophils Absolute 0 03 Thousands/µL                  XR chest 1 view portable    (Results Pending)         Procedures  Procedures      ED Course                             SBIRT 20yo+    Flowsheet Row Most Recent Value   Initial Alcohol Screen: US AUDIT-C     1  How often do you have a drink containing alcohol? 0 Filed at: 05/10/2023 2056   2  How many drinks containing alcohol do you have on a typical day you are drinking? 0 Filed at: 05/10/2023 2056   3b  FEMALE Any Age, or MALE 65+: How often do you have 4 or more drinks on one occassion? 0 Filed at: 05/10/2023 2056   Audit-C Score 0 Filed at: 05/10/2023 2056   LISSETH: How many times in the past year have you    Used an illegal drug or used a prescription medication for non-medical reasons? Never Filed at: 05/10/2023 2056                [facility]  OSMANI Peterson is a 32 y o  female with PMH asthma who presents to the emergency department with shortness of breath and cough  Workup including vital signs, physical exam, EKG, CXR and labs   EKG without acute ischemic changes, CXR without acute cardiopulmonary abnormalities and labs largely unremarkable  Most likely diagnosis bronchitis with asthma exacerbation  Treatment including DuoNeb, magnesium, already on steroids  Some improvement in ER after treatment  Given prolonged course of bronchitis, plan to treat with course of antibiotics  Tussionex prescription provided for cough  Stable for discharge home with primary care follow up, discharge instructions and return precautions given  Disposition  Final diagnoses:   Acute bronchitis     Time reflects when diagnosis was documented in both MDM as applicable and the Disposition within this note     Time User Action Codes Description Comment    5/10/2023 10:31 PM Michael Arce [J20 [id number]] Acute bronchitis       ED Disposition     ED Disposition   Discharge    Condition   Stable    Date/Time   Wed May 10, 2023 10:41 PM    Comment   Sherren Goad discharge to home/self care                 Follow-up Information     Follow up With Specialties Details Why Andrade Zaragoza MD [facility]   [facility] [facility] [address]  [phone]            Discharge Medication List as of 5/10/2023 10:41 PM      START taking these medications    Details   amoxicillin-clavulanate (AUGMENTIN) 875-125 mg per tablet Take 1 tablet by mouth every 12 (twelve) hours for 7 days, Starting Wed 5/10/2023, Until Wed 5/17/2023, Normal      Hydrocod Osiel-Chlorphe Osiel ER (TUSSIONEX) 10-8 mg/5 mL ER suspension Take 5 mL by mouth every 12 (twelve) hours as needed for cough for up to 10 days Max Daily Amount: 10 mL, Starting Wed 5/10/2023, Until Sat 5/20/2023 at 2359, Normal         CONTINUE these medications which have NOT CHANGED    Details   albuterol (2 5 mg/3 mL) 0 083 % nebulizer solution Take 3 mL (2 5 mg total) by nebulization every 6 (six) hours as needed for wheezing or shortness of breath, Starting Wed 5/10/2023, Normal      albuterol (ProAir HFA) 90 mcg/act inhaler Inhale 2 puffs every 6 (six) hours as "needed for wheezing, Starting Fri 7/8/2022, Normal      aspirin-acetaminophen-caffeine (EXCEDRIN MIGRAINE) 250-250-65 MG per tablet Take 1 tablet by mouth as needed for headaches, Historical Med      fluticasone (FLONASE) 50 mcg/act nasal spray 2 sprays into each nostril daily, Starting Thu 10/6/2022, Until Wed 3/29/2023, Normal      ipratropium (ATROVENT) 0 02 % nebulizer solution Take 2 5 mL (0 5 mg total) by nebulization every 6 (six) hours as needed for wheezing or shortness of breath, Starting Wed 5/10/2023, Until Fri 6/9/2023 at 2359, Normal      predniSONE 10 mg tablet 5,5,4,4,3,3,2,2,1,1, Normal      Prenatal MV-Min-FA-Omega-3 (PRENATAL GUMMIES/DHA & FA PO) Take by mouth, Historical Med             No discharge procedures on file  PDMP Review     None           ED Provider  Attending physically available and evaluated Scotty Hoff I managed the patient along with the ED Attending  Electronically Signed by    Portions of the record may have been created with voice recognition software  Occasional wrong word or \"sound a like\" substitutions may have occurred due to the inherent limitations of voice recognition software    Read the chart carefully and recognize, using context, where substitutions have occurred     Juan M Fong MD  05/10/23 2317    "

## 2023-05-11 NOTE — ED ATTENDING ATTESTATION
5/10/2023  IDain Peabody, MD, saw and evaluated the patient  I have discussed the patient with the resident/non-physician practitioner and agree with the resident's/non-physician practitioner's findings, Plan of Care, and MDM as documented in the resident's/non-physician practitioner's note, except where noted  All available labs and Radiology studies were reviewed  I was present for key portions of any procedure(s) performed by the resident/non-physician practitioner and I was immediately available to provide assistance  At this point I agree with the current assessment done in the Emergency Department  I have conducted an independent evaluation of this patient a history and physical is as follows:    ED Course         Critical Care Time  Procedures  33 y/o female with sob, wheezing, cough  H/o asthma - never been intubated  She was Started on prednisone by her PCP yest   , taking her inhaler without much relief  No fevers  Well-appearing, no distress, tachycardic, decreased air movement, abd   Nontender, ext no edema

## 2023-06-29 ENCOUNTER — TRANSCRIBE ORDERS (OUTPATIENT)
Dept: LAB | Facility: CLINIC | Age: 28
End: 2023-06-29

## 2023-06-29 ENCOUNTER — APPOINTMENT (OUTPATIENT)
Dept: LAB | Facility: CLINIC | Age: 28
End: 2023-06-29
Payer: COMMERCIAL

## 2023-06-29 DIAGNOSIS — Z31.41 FERTILITY TESTING: ICD-10-CM

## 2023-06-29 DIAGNOSIS — Z11.3 SCREENING EXAMINATION FOR VENEREAL DISEASE: ICD-10-CM

## 2023-06-29 DIAGNOSIS — Z01.83 ENCOUNTER FOR BLOOD TYPING: ICD-10-CM

## 2023-06-29 DIAGNOSIS — Z11.59 SCREENING EXAMINATION FOR RUBELLA: Primary | ICD-10-CM

## 2023-06-29 DIAGNOSIS — Z11.59 SCREENING EXAMINATION FOR RUBELLA: ICD-10-CM

## 2023-06-29 DIAGNOSIS — Z13.0 SCREENING FOR IRON DEFICIENCY ANEMIA: ICD-10-CM

## 2023-06-29 DIAGNOSIS — Z11.4 SCREENING FOR HUMAN IMMUNODEFICIENCY VIRUS: ICD-10-CM

## 2023-06-29 LAB
ABO GROUP BLD: NORMAL
BLD GP AB SCN SERPL QL: NEGATIVE
ERYTHROCYTE [DISTWIDTH] IN BLOOD BY AUTOMATED COUNT: 12.6 % (ref 11.6–15.1)
HBV SURFACE AG SER QL: NORMAL
HCT VFR BLD AUTO: 41.6 % (ref 34.8–46.1)
HGB BLD-MCNC: 13.7 G/DL (ref 11.5–15.4)
HIV 1+2 AB+HIV1 P24 AG SERPL QL IA: NORMAL
HIV 2 AB SERPL QL IA: NORMAL
HIV1 AB SERPL QL IA: NORMAL
HIV1 P24 AG SERPL QL IA: NORMAL
MCH RBC QN AUTO: 29.5 PG (ref 26.8–34.3)
MCHC RBC AUTO-ENTMCNC: 32.9 G/DL (ref 31.4–37.4)
MCV RBC AUTO: 90 FL (ref 82–98)
PLATELET # BLD AUTO: 365 THOUSANDS/UL (ref 149–390)
PMV BLD AUTO: 9.8 FL (ref 8.9–12.7)
RBC # BLD AUTO: 4.64 MILLION/UL (ref 3.81–5.12)
RH BLD: NEGATIVE
RUBV IGG SERPL IA-ACNC: 19.2 IU/ML
TREPONEMA PALLIDUM IGG+IGM AB [PRESENCE] IN SERUM OR PLASMA BY IMMUNOASSAY: NORMAL
TSH SERPL DL<=0.05 MIU/L-ACNC: 1.85 UIU/ML (ref 0.45–4.5)
VZV IGG SER QL IA: ABNORMAL
WBC # BLD AUTO: 9.86 THOUSAND/UL (ref 4.31–10.16)

## 2023-06-29 PROCEDURE — 86900 BLOOD TYPING SEROLOGIC ABO: CPT

## 2023-06-29 PROCEDURE — 36415 COLL VENOUS BLD VENIPUNCTURE: CPT

## 2023-06-29 PROCEDURE — 86787 VARICELLA-ZOSTER ANTIBODY: CPT

## 2023-06-29 PROCEDURE — 86850 RBC ANTIBODY SCREEN: CPT

## 2023-06-29 PROCEDURE — 87491 CHLMYD TRACH DNA AMP PROBE: CPT

## 2023-06-29 PROCEDURE — 86762 RUBELLA ANTIBODY: CPT

## 2023-06-29 PROCEDURE — 82397 CHEMILUMINESCENT ASSAY: CPT

## 2023-06-29 PROCEDURE — 86803 HEPATITIS C AB TEST: CPT

## 2023-06-29 PROCEDURE — 86780 TREPONEMA PALLIDUM: CPT

## 2023-06-29 PROCEDURE — 85027 COMPLETE CBC AUTOMATED: CPT

## 2023-06-29 PROCEDURE — 87389 HIV-1 AG W/HIV-1&-2 AB AG IA: CPT

## 2023-06-29 PROCEDURE — 87591 N.GONORRHOEAE DNA AMP PROB: CPT

## 2023-06-29 PROCEDURE — 86901 BLOOD TYPING SEROLOGIC RH(D): CPT

## 2023-06-29 PROCEDURE — 87340 HEPATITIS B SURFACE AG IA: CPT

## 2023-06-29 PROCEDURE — 84443 ASSAY THYROID STIM HORMONE: CPT

## 2023-06-30 LAB
C TRACH DNA SPEC QL NAA+PROBE: NEGATIVE
N GONORRHOEA DNA SPEC QL NAA+PROBE: NEGATIVE

## 2023-07-01 LAB
HCV AB S/CO SERPL IA: NON REACTIVE
SL AMB INTERPRETATION: NORMAL

## 2023-07-06 LAB — MIS SERPL-MCNC: 2.82 NG/ML

## 2023-08-01 ENCOUNTER — ULTRASOUND (OUTPATIENT)
Dept: OBGYN CLINIC | Facility: CLINIC | Age: 28
End: 2023-08-01
Payer: COMMERCIAL

## 2023-08-01 DIAGNOSIS — Z33.1 INCIDENTAL PREGNANCY: ICD-10-CM

## 2023-08-01 DIAGNOSIS — N91.1 AMENORRHEA, SECONDARY: ICD-10-CM

## 2023-08-01 DIAGNOSIS — Z36.9 FIRST TRIMESTER SCREENING: Primary | ICD-10-CM

## 2023-08-01 NOTE — PROGRESS NOTES
Patient presents for early ultrasound for history of miscarriage, ?heterotopic pregnancy per report though no evidence of this on chart review today. (advised patient and partner of this). Denies bleeding since LMP. Some nausea/heartburn. Aware of what to take. Discussed adding iron to PNV. See ultrasound tab for details. Ultrasound confirms IUP and LMP dating. MFM referral given. Bleeding precautions reviewed. Return for OB intake and PN1.

## 2023-08-21 ENCOUNTER — INITIAL PRENATAL (OUTPATIENT)
Dept: OBGYN CLINIC | Facility: CLINIC | Age: 28
End: 2023-08-21

## 2023-08-21 DIAGNOSIS — Z34.01 ENCOUNTER FOR SUPERVISION OF NORMAL FIRST PREGNANCY IN FIRST TRIMESTER: Primary | ICD-10-CM

## 2023-08-21 DIAGNOSIS — Z36.9 ENCOUNTER FOR ANTENATAL SCREENING OF MOTHER: ICD-10-CM

## 2023-08-21 DIAGNOSIS — Z31.430 ENCOUNTER OF FEMALE FOR TESTING FOR GENETIC DISEASE CARRIER STATUS FOR PROCREATIVE MANAGEMENT: ICD-10-CM

## 2023-08-21 PROCEDURE — OBC

## 2023-08-21 NOTE — PROGRESS NOTES
OB INTAKE INTERVIEW  Patient is 28 y.o.y.o. who presents for OB intake at 10 wks  She is accompanied by herself during this phone encounter  The father of her baby Ab Graff) is involved in the pregnancy and is 28years old.   Last Menstrual Period: 23  Ultrasound: Measured 7 weeks 1 days on 2023  Estimated Date of Delivery: 3/18/2024 confirmed by  7 week US    Signs/Symptoms of Pregnancy  Current pregnancy symptoms: pregnancy symptoms are okay- POTS flare is what is bothering her the most at this point. Dizziness, SOB, Increased in Heart Rate, she can tell difference between her typical migraine headaches and her POTS headaches and its definitely more POTS related. Constipation YES  Headaches YES, hx of POTS headaches and migraines. Cramping/spotting no  PICA cravings no    Diabetes-  There is no height or weight on file to calculate BMI. If patient has 1 or more, please order early 1 hour GTT  History of GDM no  BMI >35 YES   History of PCOS or current metformin use no  History of LGA/macrosomic infant (4000g/9lbs) no    If patient has 2 or more, please order early 1 hour GTT  BMI>30 YES  AMA no  First degree relative with type 2 diabetes no  History of chronic HTN, hyperlipidemia, elevated A1C no  High risk race (, , ,  or ) no    Hypertension- if you answer yes, please order preeclampsia labs (cbc, comprehensive metabolic panel, urine protein creatinine ratio, uric acid)  History of of chronic HTN no  History of gestational HTN no  History of preeclampsia, eclampsia, or HELLP syndrome no  History of diabetes no  History of lupus, autoimmune disease, kidney disease no    Thyroid- if yes order TSH with reflex T4  History of thyroid disease no    Bleeding Disorder or Hx of DVT-patient or first degree relative with history of. Order the following if not done previously.    (Factor V, antithrombin III, prothrombin gene mutation, protein C and S Ag, lupus anticoagulant, anticardiolipin, beta-2 glycoprotein)   no    OB/GYN-  History of abnormal pap smear YES       Date of last pap smear 2023  History of HPV YES  History of Herpes/HSV no  History of other STI (gonorrhea, chlamydia, trich) no  History of prior  no  History of prior  no  History of  delivery prior to 36 weeks 6 days no  History of blood transfusion no  Ok for blood transfusion YES    Substance screening- if yes outside of tobacco for her or anyone in her home-order urine drug screen  History of tobacco use no  Currently using tobacco no  Currently using alcohol no  Presently using drugs no  Past drug use  no  IV drug use- no  Partner drug use no  Parent/Family drug use no    MRSA Screening-   Does the pt have a hx of MRSA? no  If yes- please follow MRSA protocol and obtain a nasal swab for MRSA culture    Immunizations:  Influenza vaccine given this season yes  Discussed Tdap vaccine yes  Discussed COVID Vaccine moderna x2     Genetic/M-  Do you or your partner have a history of any of the following in yourselves or first degree relatives? Cystic fibrosis no  Spinal muscular atrophy no  Hemoglobinopathy/Sickle Cell/Thalassemia no  Fragile X Intellectual Disability no    If yes, discuss carrier screening and recommend consultation with [facility]/genetic counseling. If no, discuss option for carrier screening and/or genetic testing with Nuchal Ultrasound.  Patient interested yes  Appointment at [facility] made yes appointment     Interview education  [facility] Pregnancy Essentials Book reviewed, discussed and attached to their AVS yes    Nurse/Family Partnership- patient may qualify no; referral placed no    Prenatal lab work scripts yes  Extra labs ordered:  1 hr glucose  CF  SMA DNA    Aspirin/Preeclampsia Screen    Risk Level Risk Factor Recommendation   LOW Prior Uncomplicated full-term delivery no No Aspirin recommendation        MODERATE Nulliparity YES Recommend low-dose aspirin if     BMI>30 YES 2 or more moderate risk factors    Family History Preeclampsia (mother/sister) no     35yr old or greater no      or Low Socioeconomic no     IVF Pregnancy  no     Personal History Risks (low birth weight, prior adverse preg outcome, >10yr preg interval) no         HIGH History of Preeclampsia no Recommend low-dose aspirin if     Multifetal gestation no 1 or more high risk factors    Chronic HTN no     Type 1 or 2 Diabetes no     Renal Disease no     Autoimmune Disease  no      Contraindications to ASA therapy:  NSAID/ ASA allergy: no  Nasal polyps: no  Asthma with history of ASA induced bronchospasm: no  Relative contraindications:  History of GI bleed: no  Active peptic ulcer disease: no  Severe hepatic dysfunction: no    Patient should be recommended to take ASA 162mg during this pregnancy from 12-36wks to lower her risk of preeclampsia: MODERATE CRITERIA MET, AWARE OF ASA THERAPY STARTING AT 12 WEEKS. The patient has a history now or in prior pregnancy notable for:  POTS - postural orthostatic tachycardia syndrome, migraines, anxiety, depression, PTSD, asthma    Details that I feel the provider should be aware of: This is a planned pregnancy for Carlota Felipe and her  Tomasa Menchaca. 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 She has a history of miscarriage in 2022 which is undecided if it was an ectopic vs chemical pregnancy. She underwent a D&C and cystectomy and HCG levels finally resolved. Tomasa Menchaca is a former  who know is a  dealing with insurance fraud. She is overall feeling well with her pregnancy, her symptoms are really coming from her POTS. She used to manage it with salt intake and hydration but feels like this isn't quite working as well. She has been experiencing POTS headaches, dizziness. SOB, and increased tachycardia episodes.  She was curious to other ways of managing it, maybe through IV hydration as this is one of the typical treatments for POTS. PN1 visit scheduled. The patient was oriented to our practice, reviewed delivering physicians and [facility] for Delivery. All questions were answered.     Interviewed by: Chary Valentino RN

## 2023-08-21 NOTE — PATIENT INSTRUCTIONS
Congratulations!! Please review our Pregnancy Essential Guide and [facility] L&D Virtual tour from our [employer]. . [facility] Pregnancy Essentials Guide  [facility] ([facility])     [id number] [facility] ([employer])

## 2023-08-26 ENCOUNTER — APPOINTMENT (OUTPATIENT)
Dept: LAB | Age: 28
End: 2023-08-26

## 2023-08-26 ENCOUNTER — APPOINTMENT (OUTPATIENT)
Dept: LAB | Age: 28
End: 2023-08-26
Payer: COMMERCIAL

## 2023-08-26 DIAGNOSIS — Z34.01 ENCOUNTER FOR SUPERVISION OF NORMAL FIRST PREGNANCY IN FIRST TRIMESTER: ICD-10-CM

## 2023-08-26 DIAGNOSIS — Z00.8 HEALTH EXAMINATION IN POPULATION SURVEY: ICD-10-CM

## 2023-08-26 LAB
ABO GROUP BLD: NORMAL
BASOPHILS # BLD AUTO: 0.04 THOUSANDS/ÂΜL (ref 0–0.1)
BASOPHILS NFR BLD AUTO: 0 % (ref 0–1)
BLD GP AB SCN SERPL QL: NEGATIVE
CHOLEST SERPL-MCNC: 120 MG/DL
EOSINOPHIL # BLD AUTO: 0.12 THOUSAND/ÂΜL (ref 0–0.61)
EOSINOPHIL NFR BLD AUTO: 1 % (ref 0–6)
ERYTHROCYTE [DISTWIDTH] IN BLOOD BY AUTOMATED COUNT: 12.4 % (ref 11.6–15.1)
EST. AVERAGE GLUCOSE BLD GHB EST-MCNC: 111 MG/DL
GLUCOSE 1H P 50 G GLC PO SERPL-MCNC: 148 MG/DL (ref 40–134)
HBA1C MFR BLD: 5.5 %
HCT VFR BLD AUTO: 37.6 % (ref 34.8–46.1)
HDLC SERPL-MCNC: 52 MG/DL
HGB BLD-MCNC: 12.7 G/DL (ref 11.5–15.4)
IMM GRANULOCYTES # BLD AUTO: 0.03 THOUSAND/UL (ref 0–0.2)
IMM GRANULOCYTES NFR BLD AUTO: 0 % (ref 0–2)
LDLC SERPL CALC-MCNC: 56 MG/DL (ref 0–100)
LYMPHOCYTES # BLD AUTO: 1.7 THOUSANDS/ÂΜL (ref 0.6–4.47)
LYMPHOCYTES NFR BLD AUTO: 17 % (ref 14–44)
MCH RBC QN AUTO: 30 PG (ref 26.8–34.3)
MCHC RBC AUTO-ENTMCNC: 33.8 G/DL (ref 31.4–37.4)
MCV RBC AUTO: 89 FL (ref 82–98)
MONOCYTES # BLD AUTO: 0.38 THOUSAND/ÂΜL (ref 0.17–1.22)
MONOCYTES NFR BLD AUTO: 4 % (ref 4–12)
NEUTROPHILS # BLD AUTO: 7.93 THOUSANDS/ÂΜL (ref 1.85–7.62)
NEUTS SEG NFR BLD AUTO: 78 % (ref 43–75)
NONHDLC SERPL-MCNC: 68 MG/DL
NRBC BLD AUTO-RTO: 0 /100 WBCS
PLATELET # BLD AUTO: 346 THOUSANDS/UL (ref 149–390)
PMV BLD AUTO: 9.9 FL (ref 8.9–12.7)
RBC # BLD AUTO: 4.23 MILLION/UL (ref 3.81–5.12)
RH BLD: NEGATIVE
TRIGL SERPL-MCNC: 61 MG/DL
WBC # BLD AUTO: 10.2 THOUSAND/UL (ref 4.31–10.16)

## 2023-08-26 PROCEDURE — 36415 COLL VENOUS BLD VENIPUNCTURE: CPT

## 2023-08-26 PROCEDURE — 86900 BLOOD TYPING SEROLOGIC ABO: CPT

## 2023-08-26 PROCEDURE — 85025 COMPLETE CBC W/AUTO DIFF WBC: CPT

## 2023-08-26 PROCEDURE — 86901 BLOOD TYPING SEROLOGIC RH(D): CPT

## 2023-08-26 PROCEDURE — 80061 LIPID PANEL: CPT

## 2023-08-26 PROCEDURE — 86850 RBC ANTIBODY SCREEN: CPT

## 2023-08-26 PROCEDURE — 81001 URINALYSIS AUTO W/SCOPE: CPT

## 2023-08-26 PROCEDURE — 83036 HEMOGLOBIN GLYCOSYLATED A1C: CPT

## 2023-08-26 PROCEDURE — 87086 URINE CULTURE/COLONY COUNT: CPT

## 2023-08-26 PROCEDURE — 82950 GLUCOSE TEST: CPT

## 2023-08-27 LAB
BACTERIA UR CULT: NORMAL
BACTERIA UR QL AUTO: ABNORMAL /HPF
BILIRUB UR QL STRIP: NEGATIVE
CLARITY UR: CLEAR
COLOR UR: YELLOW
GLUCOSE UR STRIP-MCNC: NEGATIVE MG/DL
HGB UR QL STRIP.AUTO: NEGATIVE
KETONES UR STRIP-MCNC: NEGATIVE MG/DL
LEUKOCYTE ESTERASE UR QL STRIP: ABNORMAL
MUCOUS THREADS UR QL AUTO: ABNORMAL
NITRITE UR QL STRIP: NEGATIVE
NON-SQ EPI CELLS URNS QL MICRO: ABNORMAL /HPF
PH UR STRIP.AUTO: 7 [PH]
PROT UR STRIP-MCNC: ABNORMAL MG/DL
RBC #/AREA URNS AUTO: ABNORMAL /HPF
SP GR UR STRIP.AUTO: 1.02 (ref 1–1.03)
UROBILINOGEN UR STRIP-ACNC: <2 MG/DL
WBC #/AREA URNS AUTO: ABNORMAL /HPF

## 2023-08-28 ENCOUNTER — TELEPHONE (OUTPATIENT)
Dept: OBGYN CLINIC | Facility: CLINIC | Age: 28
End: 2023-08-28

## 2023-08-28 DIAGNOSIS — O99.810 ABNORMAL GLUCOSE AFFECTING PREGNANCY: Primary | ICD-10-CM

## 2023-08-28 NOTE — TELEPHONE ENCOUNTER
----- Message from Carmen Norris MD sent at 8/27/2023 11:39 AM EDT -----  Nabila's GCT was elevated at 148. She will need a 3h GTT.     Dr Yves Finley

## 2023-08-28 NOTE — TELEPHONE ENCOUNTER
Dr. Abimael Moscoso  [address] [facility] [address] [place] [address]    **Report to Dr. Abimael Moscoso's office [address]  **Please call [phone] with any questions    Small Bowel Capsule Endoscopy  Date of Procedure: Monday, April 24, 2023  Check in @: 8:30 AM      Nulytely prescription called into: [facility]     Week Before Procedure:  • Check with your physician if you are diabetic for medication adjustments that may be needed the day before and morning of the procedure. Other medication(s) may be continued as usual.     Day prior to the procedure:  • Start a clear liquid diet beginning at breakfast. Clear liquids should be taken in unlimited amounts. (Refer to the clear liquid diet list at the bottom of the instructions)   • In the morning, add tap water to the laxative container, shake well and place in the refrigerator.   • At 5 pm, drink one 8 oz. glass of laxative every 15 minutes until prep is complete. Prep must be completed 4 hours prior to test.   • Do not eat or drink anything else after midnight.The day of your procedure, take all of your prescribed medications unless instructed differently by a medical staff member.     Clear Liquid Diet List:  Do not eat or drink anything red or purple. Beverages: soft drinks/soda, Gatorade or Wiliam-Aid, clear fruit juices without pulp, water, tea, coffee (no milk or non dairy creamer). Broths: chicken, beef or vegetable. Desserts: hard candies, Jell-O, Popsicles. (No fruit bars or sherbet)          Kosta Zurita asking if you will go over her labs, please

## 2023-08-29 ENCOUNTER — TELEPHONE (OUTPATIENT)
Facility: HOSPITAL | Age: 28
End: 2023-08-29

## 2023-08-29 NOTE — TELEPHONE ENCOUNTER
Called and spoke to patient regarding the appointment change for her upcoming appointment on 10/31. Patient requested a new appointment. Appointment changed and confirmed with patient.

## 2023-09-02 ENCOUNTER — APPOINTMENT (OUTPATIENT)
Dept: LAB | Facility: CLINIC | Age: 28
End: 2023-09-02
Payer: COMMERCIAL

## 2023-09-02 DIAGNOSIS — O99.810 ABNORMAL GLUCOSE AFFECTING PREGNANCY: ICD-10-CM

## 2023-09-02 DIAGNOSIS — Z31.430 ENCOUNTER OF FEMALE FOR TESTING FOR GENETIC DISEASE CARRIER STATUS FOR PROCREATIVE MANAGEMENT: ICD-10-CM

## 2023-09-02 DIAGNOSIS — Z36.9 ENCOUNTER FOR ANTENATAL SCREENING OF MOTHER: ICD-10-CM

## 2023-09-02 DIAGNOSIS — Z34.01 ENCOUNTER FOR SUPERVISION OF NORMAL FIRST PREGNANCY IN FIRST TRIMESTER: ICD-10-CM

## 2023-09-02 LAB
GLUCOSE 1H P 100 G GLC PO SERPL-MCNC: 168 MG/DL (ref 65–179)
GLUCOSE 2H P 100 G GLC PO SERPL-MCNC: 142 MG/DL (ref 65–154)
GLUCOSE 3H P 100 G GLC PO SERPL-MCNC: 44 MG/DL (ref 65–139)
GLUCOSE P FAST SERPL-MCNC: 82 MG/DL (ref 65–94)

## 2023-09-02 PROCEDURE — 82952 GTT-ADDED SAMPLES: CPT

## 2023-09-02 PROCEDURE — 36415 COLL VENOUS BLD VENIPUNCTURE: CPT

## 2023-09-02 PROCEDURE — 81329 SMN1 GENE DOS/DELETION ALYS: CPT

## 2023-09-02 PROCEDURE — 82951 GLUCOSE TOLERANCE TEST (GTT): CPT

## 2023-09-02 PROCEDURE — 81220 CFTR GENE COM VARIANTS: CPT

## 2023-09-05 ENCOUNTER — TELEPHONE (OUTPATIENT)
Dept: OBGYN CLINIC | Facility: CLINIC | Age: 28
End: 2023-09-05

## 2023-09-05 ENCOUNTER — TELEPHONE (OUTPATIENT)
Facility: HOSPITAL | Age: 28
End: 2023-09-05

## 2023-09-05 NOTE — TELEPHONE ENCOUNTER
Left voicemail for pt regarding time change for her apt on 9/11/2023. Requested that she calls the office back if she has any questions or needs to reschedule.

## 2023-09-05 NOTE — TELEPHONE ENCOUNTER
----- Message from Calista Hernandez MD sent at 9/5/2023 11:01 AM EDT -----  Please let Joshua Dubon know that her 3h GTT was normal.  No elevated values. She will need to repeat this at 26-28 weeks.     Thanks!!

## 2023-09-07 ENCOUNTER — ULTRASOUND (OUTPATIENT)
Dept: OBGYN CLINIC | Facility: CLINIC | Age: 28
End: 2023-09-07

## 2023-09-07 VITALS — SYSTOLIC BLOOD PRESSURE: 120 MMHG | BODY MASS INDEX: 38.12 KG/M2 | DIASTOLIC BLOOD PRESSURE: 80 MMHG | WEIGHT: 236.2 LBS

## 2023-09-07 DIAGNOSIS — Z67.91 RH NEGATIVE STATE IN ANTEPARTUM PERIOD, FIRST TRIMESTER: ICD-10-CM

## 2023-09-07 DIAGNOSIS — O99.810 ABNORMAL GLUCOSE AFFECTING PREGNANCY: ICD-10-CM

## 2023-09-07 DIAGNOSIS — O20.9 VAGINAL BLEEDING IN PREGNANCY, FIRST TRIMESTER: Primary | ICD-10-CM

## 2023-09-07 DIAGNOSIS — O26.891 RH NEGATIVE STATE IN ANTEPARTUM PERIOD, FIRST TRIMESTER: ICD-10-CM

## 2023-09-07 PROBLEM — O99.211 OBESITY AFFECTING PREGNANCY IN FIRST TRIMESTER: Status: ACTIVE | Noted: 2023-09-07

## 2023-09-07 NOTE — PROGRESS NOTES
Kira Wynn  1995    S:  Titi Houston  is a 29 y.o. Lexy Barreto @w3d who presents for a problem visit. Awoke this morning with mild cramping and vaginal bleeding. Reports stringy blood with wiping and red blood in toilet water. No bleeding since. Hx significant for POTS, peripheral neuropathy, endometriosis, migraine w/o aura, BMI 38, PTSD, anxiety/depression, and asthma. Pregnancy notable for abnormal early 1 hr, normal early 3 hr. Rh neg, obesity. O:  /80   Wt 107 kg (236 lb 3.2 oz)   LMP 06/12/2023 (Exact Date)   BMI 38.12 kg/m²      . She appears well and is in no distress  Normocephalic, atraumatic. Normal respiratory effort  Abdomen is soft and nontender  No focal neurological deficits.   + anxious. Normal behavior and affect. A/P:  Diagnoses and all orders for this visit:    Vaginal bleeding in pregnancy, first trimester  -     Rho(D) immune globulin (RHOGAM ULTRA-FILTERED PLUS) IM injection 300 mcg    Abnormal glucose affecting pregnancy    Rh negative state in antepartum period, first trimester  -     Rho(D) immune globulin (RHOGAM ULTRA-FILTERED PLUS) IM injection 300 mcg    Reassured . Advised on monitoring for worsening / recurrence of cramping/bleeding. To keep PN 1 appt for tomorrow and NT scan 9/11/23   Precautions given. All questions answered.

## 2023-09-08 ENCOUNTER — INITIAL PRENATAL (OUTPATIENT)
Dept: OBGYN CLINIC | Facility: CLINIC | Age: 28
End: 2023-09-08
Payer: COMMERCIAL

## 2023-09-08 ENCOUNTER — TELEPHONE (OUTPATIENT)
Dept: OBGYN CLINIC | Facility: CLINIC | Age: 28
End: 2023-09-08

## 2023-09-08 VITALS
WEIGHT: 235 LBS | HEART RATE: 88 BPM | DIASTOLIC BLOOD PRESSURE: 80 MMHG | BODY MASS INDEX: 37.93 KG/M2 | SYSTOLIC BLOOD PRESSURE: 122 MMHG

## 2023-09-08 DIAGNOSIS — O20.9 VAGINAL BLEEDING IN PREGNANCY, FIRST TRIMESTER: ICD-10-CM

## 2023-09-08 DIAGNOSIS — G90.A POTS (POSTURAL ORTHOSTATIC TACHYCARDIA SYNDROME): ICD-10-CM

## 2023-09-08 DIAGNOSIS — J45.20 MILD INTERMITTENT ASTHMA WITHOUT COMPLICATION: ICD-10-CM

## 2023-09-08 DIAGNOSIS — R87.810 ASCUS WITH POSITIVE HIGH RISK HPV CERVICAL: ICD-10-CM

## 2023-09-08 DIAGNOSIS — O99.810 ABNORMAL GLUCOSE AFFECTING PREGNANCY: ICD-10-CM

## 2023-09-08 DIAGNOSIS — Z3A.12 12 WEEKS GESTATION OF PREGNANCY: ICD-10-CM

## 2023-09-08 DIAGNOSIS — O99.211 OBESITY AFFECTING PREGNANCY IN FIRST TRIMESTER: ICD-10-CM

## 2023-09-08 DIAGNOSIS — Z34.01 ENCOUNTER FOR SUPERVISION OF NORMAL FIRST PREGNANCY IN FIRST TRIMESTER: Primary | ICD-10-CM

## 2023-09-08 DIAGNOSIS — R87.610 ASCUS WITH POSITIVE HIGH RISK HPV CERVICAL: ICD-10-CM

## 2023-09-08 DIAGNOSIS — O26.891 RH NEGATIVE STATE IN ANTEPARTUM PERIOD, FIRST TRIMESTER: ICD-10-CM

## 2023-09-08 DIAGNOSIS — Z67.91 RH NEGATIVE STATE IN ANTEPARTUM PERIOD, FIRST TRIMESTER: ICD-10-CM

## 2023-09-08 LAB
CITATION REF LAB TEST: NORMAL
CLINICAL INFO: NORMAL
ETHNIC BACKGROUND STATED: NORMAL
GENE DIS ANL CARRIER INTERP-IMP: NORMAL
GENE MUT TESTED BLD/T: NORMAL
LAB DIRECTOR NAME PROVIDER: NORMAL
REASON FOR REFERRAL (NARRATIVE): NORMAL
RECOMMENDATION PATIENT DOC-IMP: NORMAL
REF LAB TEST METHOD: NORMAL
SERVICE CMNT-IMP: NORMAL
SL AMB  POCT GLUCOSE, UA: ABNORMAL
SL AMB POCT URINE PROTEIN: ABNORMAL
SMN1 GENE MUT ANL BLD/T: NORMAL
SPECIMEN SOURCE: NORMAL

## 2023-09-08 PROCEDURE — PNV: Performed by: PHYSICIAN ASSISTANT

## 2023-09-08 PROCEDURE — 81002 URINALYSIS NONAUTO W/O SCOPE: CPT | Performed by: PHYSICIAN ASSISTANT

## 2023-09-08 NOTE — PROGRESS NOTES
Patient presents for Pn1 visit.   LMP- 23  KWAME- 3/18/24  GA- 12w4d    Urine- trace/neg  Last pap-2023 abnormal   GC Swab UTD     Pn1 labs completed  MFM scheduled   Blue folder given at today's visit and thoroughly reviewed. No LOF, cramping or discharge. Pt had bleeding yesterday morning and passed 3 clots. Pt states feeling nauseous and dizzy recently which she believes is from POTS - Interest in IV infusions for POTS management   No further questions or concerns for today's visit.

## 2023-09-08 NOTE — PROGRESS NOTES
29 y.o. Lucía Mattson at 12w4d with Estimated Date of Delivery: 3/18/24 by LMP 6/12/2023 consistent w/ 1st trimester US. She reports some nausea, no vomiting. Believes this is related to POTS flare ups. Having some worsening dizziness which is a trigger for her nausea. She is working on increasing hydration. Inquires about having orders for IV hydration on standby for treatment of her POTS    She does not have a local cardiologist for POTS management. Was previously managed by cardiology in [place] prior to moving to [address] [place] [address] a few years ago. POTS symptoms have been minimal until + pregnancy    She did have some bleeding yesterday. Mostly brown in color. Passed a dark brown clot this AM. Mostly brown with wiping now. She did receive rhogam yesterday. And FHTs were + in office at the time. Prenatal labs: complete; passed 3 hour GTT    Genetic screening: Scheduled with [facility]     OB history: Hx of SAB- ?heterotopic pregnancy per report though no evidence of this on chart review, Rh negative    GYN history: Last pap smear 1/26/2023- ASCUS with + HPV; she did have a colpo on 4/2023- no biopsies were taken due to normal appearing cervix; repeat pap in one year; will plan for repeat pap in PP. No history of STDs. Past medical history: POTS, peripheral neuropathy, endometriosis, migraine w/o aura, BMI 38, PTSD, anxiety/depression, and asthma    Past surgical history: D&C and laparoscopic right ovarian cystectomy, Laparoscopic endometriosis fulgaration,     Social history:  Denies tobacco, alcohol, or illicit drug use. Family history:   DVT/PE: none  Cancer: [facility] (leukemia)  Stroke: [facility]    Physical exam:     Fetal heart tones: 155 on dopplers and TAUS    Patient appears well and is not in distress  Neck is supple without masses  Breasts are symmetrical without mass, tenderness, nipple discharge, skin changes or adenopathy. Abdomen is soft and nontender without masses.    External genitals are normal without lesions or rashes. Vagina is normal with minimal dark brown blood in vault  Cervix is normal without discharge or lesion. Uterus is normal for gestational age. Adnexa are normal, nontender, without palpable mass. Discussed as well during this visit was diet, prenatal vitamins, prenatal visits, lab testing, breast feeding, vaccinations, maternal fetal medicine consultations, and lifestyle.       ASSESSMENT/PLAN   Problem List Items Addressed This Visit        Respiratory    Asthma     Albuterol PRN            Cardiovascular and Mediastinum    POTS (postural orthostatic tachycardia syndrome)     Does reports some worsening episodes of dizziness and nausea  Inquiring about IV hydration for management of this  Recommended she establish with cardiology locally as she has not seen cards in a few years  Will need to inquire about the possibility of standing IV hydration orders for management of POTS  Referral for cardiology placed         Relevant Orders    Ambulatory Referral to Cardiology       Other    Vaginal bleeding in pregnancy, first trimester     Rhogam received yesterday  +FHTs and normal appearing fetus on TAUS         Abnormal glucose affecting pregnancy     Passed follow up early 3 hour GTT         Obesity affecting pregnancy in first trimester     Will need to start ASA  Passed early 3 hour GTT         Rh negative state in antepartum period, first trimester     Rhogam given yesterday due to bleeding  Due again at 28 wks         12 weeks gestation of pregnancy     Will need to remind pt to start ASA- not discussed today  Has MFM appt early next week         Relevant Orders    Ambulatory Referral to Cardiology    ASCUS with positive high risk HPV cervical     1/2023 pap- ASCUS with + HPV  4/2023 colpo- normal appearing cervix; no biopsy taken  Plan for repeat pap in postpartum        Other Visit Diagnoses     Encounter for supervision of normal first pregnancy in first trimester    -  Primary    Relevant Orders POCT urine dip (Completed)          1 - Gonorrhea and Chlamydia cultures obtained today  2 - Pap s due in postpartum  3 - Prenatal labs reviewed -- normal 3 hour  4 - Genetic screening scheduled   5 - RTO in 4 weeks for routine PN visit  6 -- she is a candidate for ASA- not discussed at visit today; will review with pt     Blue packet given and reviewed     All questions were answered & Terry Dials expressed understanding.

## 2023-09-08 NOTE — TELEPHONE ENCOUNTER
----- Message from Lydia Vick MD sent at 9/8/2023  3:16 PM EDT -----  Please let Suma Phillips know that her SMA testing was negative for the most common mutations. This means she is not a carrier of those mutations and the pregnancy is at low risk for being affected.       Thanks!!

## 2023-09-10 PROBLEM — Z3A.12 12 WEEKS GESTATION OF PREGNANCY: Status: ACTIVE | Noted: 2023-09-10

## 2023-09-10 PROBLEM — R87.810 ASCUS WITH POSITIVE HIGH RISK HPV CERVICAL: Status: ACTIVE | Noted: 2023-09-10

## 2023-09-10 PROBLEM — R87.610 ASCUS WITH POSITIVE HIGH RISK HPV CERVICAL: Status: ACTIVE | Noted: 2023-09-10

## 2023-09-10 NOTE — ASSESSMENT & PLAN NOTE
1/2023 pap- ASCUS with + HPV  4/2023 colpo- normal appearing cervix; no biopsy taken  Plan for repeat pap in postpartum

## 2023-09-10 NOTE — ASSESSMENT & PLAN NOTE
Does reports some worsening episodes of dizziness and nausea  Inquiring about IV hydration for management of this  Recommended she establish with cardiology locally as she has not seen cards in a few years  Will need to inquire about the possibility of standing IV hydration orders for management of POTS  Referral for cardiology placed

## 2023-09-11 ENCOUNTER — ROUTINE PRENATAL (OUTPATIENT)
Dept: PERINATAL CARE | Facility: CLINIC | Age: 28
End: 2023-09-11
Payer: COMMERCIAL

## 2023-09-11 ENCOUNTER — TELEPHONE (OUTPATIENT)
Dept: OBGYN CLINIC | Facility: CLINIC | Age: 28
End: 2023-09-11

## 2023-09-11 VITALS
BODY MASS INDEX: 38.25 KG/M2 | DIASTOLIC BLOOD PRESSURE: 80 MMHG | HEART RATE: 87 BPM | WEIGHT: 238 LBS | SYSTOLIC BLOOD PRESSURE: 118 MMHG | HEIGHT: 66 IN

## 2023-09-11 DIAGNOSIS — Z3A.13 13 WEEKS GESTATION OF PREGNANCY: ICD-10-CM

## 2023-09-11 DIAGNOSIS — Z36.9 UNSPECIFIED ANTENATAL SCREENING: ICD-10-CM

## 2023-09-11 DIAGNOSIS — Z33.1 INCIDENTAL PREGNANCY: ICD-10-CM

## 2023-09-11 DIAGNOSIS — R39.9 UTI SYMPTOMS: Primary | ICD-10-CM

## 2023-09-11 DIAGNOSIS — Z36.9 FIRST TRIMESTER SCREENING: ICD-10-CM

## 2023-09-11 DIAGNOSIS — Z36.82 ENCOUNTER FOR (NT) NUCHAL TRANSLUCENCY SCAN: ICD-10-CM

## 2023-09-11 DIAGNOSIS — Z33.1 PREGNANT STATE, INCIDENTAL: ICD-10-CM

## 2023-09-11 DIAGNOSIS — O99.211 OBESITY AFFECTING PREGNANCY IN FIRST TRIMESTER: Primary | ICD-10-CM

## 2023-09-11 LAB
BACTERIA UR QL AUTO: ABNORMAL /HPF
BILIRUB UR QL STRIP: NEGATIVE
CF COMMENT: NORMAL
CFTR MUT ANL BLD/T: NORMAL
CLARITY UR: CLEAR
COLOR UR: YELLOW
GLUCOSE UR STRIP-MCNC: NEGATIVE MG/DL
HGB UR QL STRIP.AUTO: NEGATIVE
KETONES UR STRIP-MCNC: ABNORMAL MG/DL
LEUKOCYTE ESTERASE UR QL STRIP: NEGATIVE
MUCOUS THREADS UR QL AUTO: ABNORMAL
NITRITE UR QL STRIP: NEGATIVE
NON-SQ EPI CELLS URNS QL MICRO: ABNORMAL /HPF
PH UR STRIP.AUTO: 5.5 [PH]
PROT UR STRIP-MCNC: ABNORMAL MG/DL
RBC #/AREA URNS AUTO: ABNORMAL /HPF
SP GR UR STRIP.AUTO: 1.02 (ref 1–1.03)
UROBILINOGEN UR STRIP-ACNC: <2 MG/DL
WBC #/AREA URNS AUTO: ABNORMAL /HPF

## 2023-09-11 PROCEDURE — 76801 OB US < 14 WKS SINGLE FETUS: CPT | Performed by: STUDENT IN AN ORGANIZED HEALTH CARE EDUCATION/TRAINING PROGRAM

## 2023-09-11 PROCEDURE — 36415 COLL VENOUS BLD VENIPUNCTURE: CPT | Performed by: STUDENT IN AN ORGANIZED HEALTH CARE EDUCATION/TRAINING PROGRAM

## 2023-09-11 PROCEDURE — 76813 OB US NUCHAL MEAS 1 GEST: CPT | Performed by: STUDENT IN AN ORGANIZED HEALTH CARE EDUCATION/TRAINING PROGRAM

## 2023-09-11 PROCEDURE — 99244 OFF/OP CNSLTJ NEW/EST MOD 40: CPT | Performed by: STUDENT IN AN ORGANIZED HEALTH CARE EDUCATION/TRAINING PROGRAM

## 2023-09-11 PROCEDURE — 87086 URINE CULTURE/COLONY COUNT: CPT | Performed by: PHYSICIAN ASSISTANT

## 2023-09-11 PROCEDURE — 81001 URINALYSIS AUTO W/SCOPE: CPT | Performed by: PHYSICIAN ASSISTANT

## 2023-09-11 RX ORDER — NITROFURANTOIN 25; 75 MG/1; MG/1
100 CAPSULE ORAL 2 TIMES DAILY
Qty: 10 CAPSULE | Refills: 0 | Status: SHIPPED | OUTPATIENT
Start: 2023-09-11 | End: 2023-09-11

## 2023-09-11 RX ORDER — ASPIRIN 81 MG/1
162 TABLET, CHEWABLE ORAL DAILY
Qty: 180 TABLET | Refills: 3 | Status: SHIPPED | OUTPATIENT
Start: 2023-09-11

## 2023-09-11 RX ORDER — NITROFURANTOIN 25; 75 MG/1; MG/1
100 CAPSULE ORAL 2 TIMES DAILY
Qty: 10 CAPSULE | Refills: 0 | Status: SHIPPED | OUTPATIENT
Start: 2023-09-11 | End: 2023-09-16

## 2023-09-11 NOTE — TELEPHONE ENCOUNTER
Reports pelvic pressure x 1-2 days. Last week with light spotting and cramping. Completed NT Scan earlier today. Pelvic pressure is mild, initially seemed to be associated with movement and now with urination. No dysuria, frequency, urgency, hematuria, F/C/S, vaginal bleeding, or cramping. Empiric tx with macrobid sent to pharmacy and use reviewed. Will send for culture and adjust tx plan accordingly. Precautions given. All questions answered.

## 2023-09-11 NOTE — PROGRESS NOTES
Patient chose to have [facility] Non-invasive Prenatal Screen with fetal sex. Patient given brochure and is aware [employer] will contact their insurance and coordinate coverage. Patient made aware she will need to respond to text message or e-mail from [address] within 2 business days or testing will be run through insurance. Patient informed text message will come from area code  "415". Provided [employer] # [phone] and web site : [email or web address].   "[facility] your test online" card with barcode and test tube ID provided to patient. Reviewed [employer]'s web site states 5-7 business days for results via their portal.   [employer] message will be sent to patient when [facility] receives results /provider reviews. 2 vials of blood drawn from  right arm by Louisa Purcell RN. Patient tolerated blood draw without difficulty. Specimens labeled with patient identifiers (name, date of birth, specimen collection date), order and specimen were verified with patient, packed and sent via [address]. [employer]  tracking #  [id number]  Copy of lab order scanned to Epic media. Maternal Fetal Medicine will have results in approximately 7-10 business days and will call patient or notify via [address] [place] [address]. Patient aware viewing lab result online will reveal fetal sex if ordered. Patient verbalized understanding of all instructions and no questions at this time.

## 2023-09-11 NOTE — LETTER
September 11, 2023     Gillian [address]  [address] [address] [address]    Patient: Genny Soulier   YOB: 1995   Date of Visit: 9/11/2023       Dear Dr. Gagnon Skill: Thank you for referring Genny Soulier to me for evaluation. Below are my notes for this consultation. If you have questions, please do not hesitate to call me. I look forward to following your patient along with you. Sincerely,        Vicki Colvin MD        CC: No Recipients    Vicki Colvin MD  9/11/2023 12:51 PM  Incomplete  [id number]  [facility]: Ms. Myrna Henderson was seen today for nuchal translucency ultrasound. See ultrasound report under "OB Procedures" tab. Physical Exam  Constitutional:       General: She is not in acute distress. Appearance: Normal appearance. HENT:      Head: Normocephalic and atraumatic. Eyes:      Extraocular Movements: Extraocular movements intact. Cardiovascular:      Rate and Rhythm: Normal rate. Pulmonary:      Effort: Pulmonary effort is normal. No respiratory distress. Skin:     Findings: No erythema or rash. Neurological:      Mental Status: She is alert and oriented to person, place, and time. Psychiatric:         Mood and Affect: Mood normal.         Behavior: Behavior normal.         Please don't hesitate to contact our office with any concerns or questions.   -Vicki Colvin MD

## 2023-09-11 NOTE — PROGRESS NOTES
[id number]  [facility] [place]: Ms. Myles Carver was seen today for nuchal translucency ultrasound. See ultrasound report under "OB Procedures" tab. Physical Exam  Constitutional:       General: She is not in acute distress. Appearance: Normal appearance. HENT:      Head: Normocephalic and atraumatic. Eyes:      Extraocular Movements: Extraocular movements intact. Cardiovascular:      Rate and Rhythm: Normal rate. Pulmonary:      Effort: Pulmonary effort is normal. No respiratory distress. Skin:     Findings: No erythema or rash. Neurological:      Mental Status: She is alert and oriented to person, place, and time. Psychiatric:         Mood and Affect: Mood normal.         Behavior: Behavior normal.         Please don't hesitate to contact our office with any concerns or questions.   -Amparo Turner MD

## 2023-09-12 ENCOUNTER — TELEPHONE (OUTPATIENT)
Dept: OBGYN CLINIC | Facility: CLINIC | Age: 28
End: 2023-09-12

## 2023-09-12 LAB — BACTERIA UR CULT: NORMAL

## 2023-09-12 NOTE — TELEPHONE ENCOUNTER
----- Message from Maksim Lane MD sent at 9/12/2023  2:04 PM EDT -----  Please let Verona Cruz know that her cystic fibrosis mutation testing was negative.     Thanks!!

## 2023-09-20 ENCOUNTER — TELEPHONE (OUTPATIENT)
Dept: OBGYN CLINIC | Facility: CLINIC | Age: 28
End: 2023-09-20

## 2023-09-20 DIAGNOSIS — O99.340 ANXIETY DISORDER AFFECTING PREGNANCY, ANTEPARTUM: Primary | ICD-10-CM

## 2023-09-20 DIAGNOSIS — F41.9 ANXIETY DISORDER AFFECTING PREGNANCY, ANTEPARTUM: Primary | ICD-10-CM

## 2023-09-20 NOTE — TELEPHONE ENCOUNTER
Pt requested a referral to be entered for anxiety/ PTSD. Per [facility], [address] [facility], referrals can be entered for Prenatal pts. Behavioral Health referral entered.

## 2023-09-29 ENCOUNTER — ROUTINE PRENATAL (OUTPATIENT)
Dept: OBGYN CLINIC | Facility: CLINIC | Age: 28
End: 2023-09-29
Payer: COMMERCIAL

## 2023-09-29 VITALS
HEART RATE: 81 BPM | BODY MASS INDEX: 38.16 KG/M2 | WEIGHT: 236.4 LBS | DIASTOLIC BLOOD PRESSURE: 86 MMHG | SYSTOLIC BLOOD PRESSURE: 130 MMHG

## 2023-09-29 DIAGNOSIS — B37.31 VULVOVAGINAL CANDIDIASIS: ICD-10-CM

## 2023-09-29 DIAGNOSIS — Z34.92 PRENATAL CARE IN SECOND TRIMESTER: Primary | ICD-10-CM

## 2023-09-29 DIAGNOSIS — Z3A.15 15 WEEKS GESTATION OF PREGNANCY: ICD-10-CM

## 2023-09-29 DIAGNOSIS — B37.2 CANDIDA INFECTION OF FLEXURAL SKIN: ICD-10-CM

## 2023-09-29 LAB
BV WHIFF TEST VAG QL: NEGATIVE
CLUE CELLS SPEC QL WET PREP: NEGATIVE
SL AMB  POCT GLUCOSE, UA: NEGATIVE
SL AMB POCT URINE PROTEIN: NEGATIVE
T VAGINALIS VAG QL WET PREP: NEGATIVE
YEAST VAG QL WET PREP: POSITIVE

## 2023-09-29 PROCEDURE — 87210 SMEAR WET MOUNT SALINE/INK: CPT | Performed by: PHYSICIAN ASSISTANT

## 2023-09-29 PROCEDURE — 99213 OFFICE O/P EST LOW 20 MIN: CPT | Performed by: PHYSICIAN ASSISTANT

## 2023-09-29 PROCEDURE — 81002 URINALYSIS NONAUTO W/O SCOPE: CPT | Performed by: PHYSICIAN ASSISTANT

## 2023-09-29 RX ORDER — NYSTATIN 100000 [USP'U]/G
POWDER TOPICAL 3 TIMES DAILY
Qty: 30 G | Refills: 0 | Status: SHIPPED | OUTPATIENT
Start: 2023-09-29

## 2023-09-29 NOTE — PROGRESS NOTES
Subjective    Patient presents to the office today for an OB problem visit at 15 weeks gestation. Over the past 2 weeks reports bilateral rash and itching under her breasts. Also notes continued nipple pain and soreness in pregnancy. States right nipple felt sore and hard upon waking up the other day symptoms did resolve throughout the day. She denies any palpable lumps, collections, nipple discharge. Additionally she reports symptoms of vaginal odor, slight irritation and increased white/yellow discharge. States discharge seems off as well as odor. Symptoms seem to come and go but are becoming bothersome to patient. She had negative GC chlamydia screening in early pregnancy has no concerns for STIs. She is also struggling with back and hip pain. She has seen a chiropractor in the past for this and is wondering if she can follow-up with him in pregnancy. Reviewed okay to see chiropractor in pregnancy. Advised her to alert chiropractor if she is pregnant and be sure he is comfortable treating her in pregnancy. Starting to feel possible flutters. Denies vaginal bleeding, LOF, contractions. Objective:  Vitals:    09/29/23 0938   BP: 130/86   Pulse: 81     FHTs- 155    Breasts- examined sitting and lying. Circular flat erythematous rash with satellite lesions present under both breasts bilaterally. Minimal tenderness noted at nipples bilaterally with exam.  No palpable lumps, collections. No nipple discharge or lymphadenopathy  GYN-external genitalia normal appearing. Thickened yellow/white, clumpy vaginal discharge noted in vault. Cervix normal appearing and closed visually.   Confirmed on digital exam.    POCT wet mount performed- positive hyphae; negative clue cells/trichomonads    Assessment and Plan  15 weeks gestation of pregnancy  Keep routine OB visits as scheduled    Candida infection of flexural skin  Rx for nystatin powder sent to pharmacy  Advised no bra at night  Try to keep area as dry as possible    Vulvovaginal candidiasis  rx sent for terconazole cream

## 2023-09-29 NOTE — ASSESSMENT & PLAN NOTE
Rx for nystatin powder sent to pharmacy  Advised no bra at night  Try to keep area as dry as possible

## 2023-10-03 PROBLEM — Z3A.16 16 WEEKS GESTATION OF PREGNANCY: Status: ACTIVE | Noted: 2023-09-10

## 2023-10-04 ENCOUNTER — ROUTINE PRENATAL (OUTPATIENT)
Dept: OBGYN CLINIC | Facility: CLINIC | Age: 28
End: 2023-10-04
Payer: COMMERCIAL

## 2023-10-04 VITALS
SYSTOLIC BLOOD PRESSURE: 120 MMHG | BODY MASS INDEX: 38.19 KG/M2 | WEIGHT: 236.6 LBS | HEART RATE: 100 BPM | DIASTOLIC BLOOD PRESSURE: 72 MMHG

## 2023-10-04 DIAGNOSIS — O99.891 BACK PAIN AFFECTING PREGNANCY IN SECOND TRIMESTER: ICD-10-CM

## 2023-10-04 DIAGNOSIS — B37.31 VULVOVAGINAL CANDIDIASIS: ICD-10-CM

## 2023-10-04 DIAGNOSIS — Z36.9 ENCOUNTER FOR ANTENATAL SCREENING: ICD-10-CM

## 2023-10-04 DIAGNOSIS — O99.211 OBESITY AFFECTING PREGNANCY IN FIRST TRIMESTER, UNSPECIFIED OBESITY TYPE: ICD-10-CM

## 2023-10-04 DIAGNOSIS — M54.9 BACK PAIN AFFECTING PREGNANCY IN SECOND TRIMESTER: ICD-10-CM

## 2023-10-04 DIAGNOSIS — Z67.91 RH NEGATIVE STATE IN ANTEPARTUM PERIOD, FIRST TRIMESTER: ICD-10-CM

## 2023-10-04 DIAGNOSIS — O99.810 ABNORMAL GLUCOSE AFFECTING PREGNANCY: ICD-10-CM

## 2023-10-04 DIAGNOSIS — O26.891 RH NEGATIVE STATE IN ANTEPARTUM PERIOD, FIRST TRIMESTER: ICD-10-CM

## 2023-10-04 DIAGNOSIS — Z33.1 INCIDENTAL PREGNANCY: ICD-10-CM

## 2023-10-04 DIAGNOSIS — Z3A.16 16 WEEKS GESTATION OF PREGNANCY: Primary | ICD-10-CM

## 2023-10-04 LAB
SL AMB  POCT GLUCOSE, UA: NORMAL
SL AMB POCT URINE PROTEIN: NORMAL

## 2023-10-04 PROCEDURE — 81002 URINALYSIS NONAUTO W/O SCOPE: CPT | Performed by: PHYSICIAN ASSISTANT

## 2023-10-04 PROCEDURE — PNV: Performed by: PHYSICIAN ASSISTANT

## 2023-10-04 NOTE — PROGRESS NOTES
Problem List Items Addressed This Visit        Genitourinary    Vulvovaginal candidiasis     Sx now resolved. Other    Abnormal glucose affecting pregnancy    Obesity affecting pregnancy in first trimester     Taking LDASA. No WG this pregnancy. Goal 11-20 lbs. Passed 3 hr GTT. Plan for repeat at 28 weeks. Rh negative state in antepartum period, first trimester    16 weeks gestation of pregnancy - Primary     Dav Villegas  is a 29 y.o. Maureen Page @16w2d who presents for routine prenatal visit. Some episodes of dizziness - relating to activity/posture, and tension headaches. HA relive with heat, stretching of neck muscles. Reviewed conservative measures to help with these sx. To report back if persistent or worsening. Declines flu vaccine today. Counseling regarding indication in pregnancy with other risk factors of asthma and obesity given. She will consider and is aware she can have this done in this office at any time if desired. NIPT - low risk   AFP - ordered   Level II - 11/3/23     Has not yet appreciated fetal movement. Denies contractions, cramping, leakage of fluid or vaginal bleeding. Reviewed PTL precautions and reasons to call. Relevant Orders    POCT urine dip (Completed)    Back pain affecting pregnancy in second trimester     Low back pain and neck muscle tension reported. Discussed common changes in pregnancy. Desires trial of PT. Referral and contact information provided.           Relevant Orders    Ambulatory referral to Physical Therapy   Other Visit Diagnoses     Incidental pregnancy        Relevant Orders    Alpha fetoprotein, maternal    Encounter for  screening        Relevant Orders    Alpha fetoprotein, maternal

## 2023-10-04 NOTE — PATIENT INSTRUCTIONS
Over-the-counter supplements: to decrease intensity and frequency of migraines    - Magnesium Oxide 400mg a day. If any diarrhea or upset stomach, decrease dose  as tolerated    - Vitamin B2 200 mg twice a day. May cause the urine to turn yellow which is normal for B 2 to do and is not a sign that you are dehydrated.

## 2023-10-04 NOTE — ASSESSMENT & PLAN NOTE
Low back pain and neck muscle tension reported. Discussed common changes in pregnancy. Desires trial of PT. Referral and contact information provided.

## 2023-10-04 NOTE — ASSESSMENT & PLAN NOTE
Titi Houston  is a 29 y.o.  @16w2d who presents for routine prenatal visit. Some episodes of dizziness - relating to activity/posture, and tension headaches. HA relive with heat, stretching of neck muscles. Reviewed conservative measures to help with these sx. To report back if persistent or worsening. Declines flu vaccine today. Counseling regarding indication in pregnancy with other risk factors of asthma and obesity given. She will consider and is aware she can have this done in this office at any time if desired. NIPT - low risk   AFP - ordered   Level II - 11/3/23     Has not yet appreciated fetal movement. Denies contractions, cramping, leakage of fluid or vaginal bleeding. Reviewed PTL precautions and reasons to call.

## 2023-10-05 ENCOUNTER — APPOINTMENT (OUTPATIENT)
Dept: LAB | Facility: CLINIC | Age: 28
End: 2023-10-05
Payer: COMMERCIAL

## 2023-10-05 DIAGNOSIS — Z33.1 INCIDENTAL PREGNANCY: ICD-10-CM

## 2023-10-05 DIAGNOSIS — Z36.9 ENCOUNTER FOR ANTENATAL SCREENING: ICD-10-CM

## 2023-10-05 PROCEDURE — 82105 ALPHA-FETOPROTEIN SERUM: CPT

## 2023-10-05 PROCEDURE — 36415 COLL VENOUS BLD VENIPUNCTURE: CPT

## 2023-10-07 LAB
2ND TRIMESTER 4 SCREEN SERPL-IMP: NORMAL
AFP ADJ MOM SERPL: 1.11
AFP INTERP AMN-IMP: NORMAL
AFP INTERP SERPL-IMP: NORMAL
AFP INTERP SERPL-IMP: NORMAL
AFP SERPL-MCNC: 30 NG/ML
AGE AT DELIVERY: 28.7 YR
GA METHOD: NORMAL
GA: 16.4 WEEKS
IDDM PATIENT QL: NO
MULTIPLE PREGNANCY: NO
NEURAL TUBE DEFECT RISK FETUS: 8647 %

## 2023-10-19 NOTE — PROGRESS NOTES
PT Evaluation     Today's date: 10/19/2023  Patient name: Elizabeth Bear  : 1995  MRN: [id number]  Referring provider: Shelly Forrest,*  Dx: No diagnosis found.               Assessment  Assessment details: Patient is a 28 y.o. female G***P*** 19 weeks pregnant with c/o of *** . Patient's symptoms limit ***. On exam patient presents with ***. Patient will benefit from skilled PT to address the above impairments to improve *** and progress back to prior level of function.     Impairments: abnormal gait, abnormal or restricted ROM, impaired physical strength and lacks appropriate home exercise program  Understanding of Dx/Px/POC: good   Prognosis: good    Goals  Within *** weeks,   1. Patient will demonstrate proper TrA contraction.   2. Patient will demonstrate proper diaphragmatic breathing.  ***. Patient will be able to ambulate for >=*** minutes without rest.   ***. Patient will be able to stand for >= *** minutes.   ***. Patient will be able to ***  ***. Patient will be able to ***    Within *** weeks or by discharge,   1. Patient will demonstrate proper pelvic floor muscle contraction and relaxation.   ***. Patient will be able to demonstrate good understanding of various laboring positions to encourage labor.  ***. Patient will be independent with pain management strategies which can include exercise, stretching, use of maternity support belt, etc.       Plan  Patient would benefit from: skilled physical therapy  Planned modality interventions: cryotherapy and thermotherapy: hydrocollator packs  Planned therapy interventions: abdominal trunk stabilization, joint mobilization, manual therapy, activity modification, balance, balance/weight bearing training, neuromuscular re-education, body mechanics training, postural training, patient education, therapeutic activities, therapeutic exercise, functional ROM exercises, strengthening, stretching, transfer training, gait training, home exercise  program and breathing training  Treatment plan discussed with: patient        PT Pelvic Floor Subjective:   History of Present Illness:   Patient is 18-19 weeks pregnant, KWAME ***. Birth plan: ***    Sleep?    Pubic symphsis (>2 indicates dysfunction)  Walking  Standing on one leg  Climb stairs  Turning in bed  History of damage to back or pelvis      Menstrual History:      Birth control: EPIC#[id number]^NOTE][      Objective           Precautions: pregnant       10/23          Vitals ***  HR  SpO2          Patient Ed                                                                                        Neuro Re-Ed                                                                                        Ther Ex           Aerobic conditioning                                                                                        Ther Activity                                 Manual                                                                  Modalities

## 2023-10-23 ENCOUNTER — APPOINTMENT (OUTPATIENT)
Dept: PHYSICAL THERAPY | Facility: REHABILITATION | Age: 28
End: 2023-10-23
Payer: COMMERCIAL

## 2023-10-31 ENCOUNTER — EVALUATION (OUTPATIENT)
Dept: PHYSICAL THERAPY | Facility: REHABILITATION | Age: 28
End: 2023-10-31
Payer: COMMERCIAL

## 2023-10-31 ENCOUNTER — ROUTINE PRENATAL (OUTPATIENT)
Dept: OBGYN CLINIC | Facility: CLINIC | Age: 28
End: 2023-10-31
Payer: COMMERCIAL

## 2023-10-31 VITALS
WEIGHT: 244.4 LBS | OXYGEN SATURATION: 98 % | BODY MASS INDEX: 39.45 KG/M2 | SYSTOLIC BLOOD PRESSURE: 102 MMHG | DIASTOLIC BLOOD PRESSURE: 80 MMHG | HEART RATE: 98 BPM

## 2023-10-31 DIAGNOSIS — M54.9 BACK PAIN AFFECTING PREGNANCY IN SECOND TRIMESTER: ICD-10-CM

## 2023-10-31 DIAGNOSIS — O99.212 OBESITY AFFECTING PREGNANCY IN SECOND TRIMESTER, UNSPECIFIED OBESITY TYPE: Primary | ICD-10-CM

## 2023-10-31 DIAGNOSIS — Z3A.20 20 WEEKS GESTATION OF PREGNANCY: ICD-10-CM

## 2023-10-31 DIAGNOSIS — O99.810 ABNORMAL GLUCOSE AFFECTING PREGNANCY: ICD-10-CM

## 2023-10-31 DIAGNOSIS — O99.891 BACK PAIN AFFECTING PREGNANCY IN SECOND TRIMESTER: ICD-10-CM

## 2023-10-31 DIAGNOSIS — O26.891 RH NEGATIVE STATE IN ANTEPARTUM PERIOD, FIRST TRIMESTER: ICD-10-CM

## 2023-10-31 DIAGNOSIS — Z67.91 RH NEGATIVE STATE IN ANTEPARTUM PERIOD, FIRST TRIMESTER: ICD-10-CM

## 2023-10-31 DIAGNOSIS — G90.A POTS (POSTURAL ORTHOSTATIC TACHYCARDIA SYNDROME): ICD-10-CM

## 2023-10-31 DIAGNOSIS — J45.20 MILD INTERMITTENT ASTHMA WITHOUT COMPLICATION: ICD-10-CM

## 2023-10-31 DIAGNOSIS — Z34.92 PRENATAL CARE IN SECOND TRIMESTER: ICD-10-CM

## 2023-10-31 LAB
SL AMB  POCT GLUCOSE, UA: NEGATIVE
SL AMB POCT URINE PROTEIN: ABNORMAL

## 2023-10-31 PROCEDURE — 97110 THERAPEUTIC EXERCISES: CPT | Performed by: PHYSICAL THERAPIST

## 2023-10-31 PROCEDURE — 81002 URINALYSIS NONAUTO W/O SCOPE: CPT | Performed by: PHYSICIAN ASSISTANT

## 2023-10-31 PROCEDURE — 97162 PT EVAL MOD COMPLEX 30 MIN: CPT | Performed by: PHYSICAL THERAPIST

## 2023-10-31 PROCEDURE — PNV: Performed by: PHYSICIAN ASSISTANT

## 2023-10-31 NOTE — ASSESSMENT & PLAN NOTE
Rescue inhaler PRN   Tends to require more often in the Fall  Has used rescue inhaler more frequently over the past week

## 2023-10-31 NOTE — PROGRESS NOTES
Patient here for prenatal visit. GA: 20w1d    Denies leaking of fluid, bleeding, cramping  Normal Fetal Movement  Urine is  Level II US-11/3/23  AFP: Negative  Declined Flu Shot previously-will get it next Friday, 11/10/23  No concerns at this time.

## 2023-10-31 NOTE — PROGRESS NOTES
20 weeks gestation of pregnancy  Anabelle Montes DeO ca  is a 29 y.o. Katherine Griselda @20w1d who presents for routine prenatal visit. Denies OB complaints. NIPT- low risk  MASFP- low risk  Level II scheduled for friday    Good fetal movement. Denies contractions, cramping, leakage of fluid or vaginal bleeding. Reviewed PTL precautions and reasons to call. Abnormal glucose affecting pregnancy  Passed early 3 hour  Plan for 3 hour with 28 wk lab set    Back pain affecting pregnancy in second trimester  Had some relief after seeing chiropractor  Having more upper back and neck pain which she believes may be related to the tension headaches she has started to experience  Has appt to establish with PT    Obesity affecting pregnancy in second trimester  Taking ASA  Level 2 scheduled    Rh negative state in antepartum period, first trimester  Rhogam due at 28 wks    POTS (postural orthostatic tachycardia syndrome)  Reports more episodes of dizziness and vertigo like sensation over the weekend  Unsure if these symptoms are related to her POTS or if she is starting to experience vertigo  Typically Fall worsens her POTS symptoms due to weather changes and allergies  Had one episode of near syncope about two weeks ago. She does have appt with cardiology scheduled for the end of November  Current episodes of dizziness feel different than typical POTS symptoms. Reports feeling like the room is spinning or shifted particularly with walking.  Symptoms are better today than they were over the weekend  Denies CP, SOB or increase in palpitations when dizzy spells occur  Advised a trial of oral antihistamines and flonase over the next week to determine if this helps with symptoms of vertigo  Keep appt with cardiology for POTS management  Had normal TSH in June 2023    Asthma  Rescue inhaler PRN   Tends to require more often in the Fall  Has used rescue inhaler more frequently over the past week

## 2023-10-31 NOTE — ASSESSMENT & PLAN NOTE
Had some relief after seeing chiropractor  Having more upper back and neck pain which she believes may be related to the tension headaches she has started to experience  Has appt to establish with PT

## 2023-10-31 NOTE — ASSESSMENT & PLAN NOTE
Reports more episodes of dizziness and vertigo like sensation over the weekend  Unsure if these symptoms are related to her POTS or if she is starting to experience vertigo  Typically Fall worsens her POTS symptoms due to weather changes and allergies  Had one episode of near syncope about two weeks ago. She does have appt with cardiology scheduled for the end of November  Current episodes of dizziness feel different than typical POTS symptoms. Reports feeling like the room is spinning or shifted particularly with walking.  Symptoms are better today than they were over the weekend  Denies CP, SOB or increase in palpitations when dizzy spells occur  Advised a trial of oral antihistamines and flonase over the next week to determine if this helps with symptoms of vertigo  Keep appt with cardiology for POTS management  Had normal TSH in June 2023

## 2023-10-31 NOTE — PROGRESS NOTES
PT Evaluation     Today's date: 10/31/2023  Patient name: Karan Sharp  : 1995  MRN: [id number]  Referring provider: Cynthia Ladd,*  Dx:   Encounter Diagnosis     ICD-10-CM    1. Back pain affecting pregnancy in second trimester  O99.891 Ambulatory referral to Physical Therapy    M54.9           Start Time: 1630  Stop Time: 7277  Total time in clinic (min): 45 minutes    Insurance: Payor: [address] [place], [place] [address]e W needed Yes   Number of visits authorized: 23  Visit count: 1 of 1; PN due   POC Expiration: 3/19/23  KWAME: 3/18/23      Assessment  Assessment details: Patient is a  29 y.o. female with c/o of neck pain with headaches, back pain and L posterior hip pain and constipation. On exam patient presents with suboccipital tenderness and restriction and decreased abdominal strength. Patient's hx and exam is suggestive of cervicogenic headaches and postural changes with pregnancy that likely are contributing to her pain. Patient will benefit from skilled PT to address the above impairments to improve comfort through her pregnancy, improve her constipation/bowel health, and improve her activity tolerance during her pregnancy. Impairments: abnormal gait, abnormal or restricted ROM, impaired physical strength, lacks appropriate home exercise program and poor posture   Understanding of Dx/Px/POC: good   Prognosis: good    Goals  Within 8 weeks,   1. Patient will demonstrate proper TrA contraction. 2. Patient will demonstrate proper diaphragmatic breathing. 3. Patient will be able to have bowel movements at last every 3 days. 4. Patient will be able to report no urinary leakage with coughing. Within 16 weeks or by discharge,   1. Patient will demonstrate proper pelvic floor muscle contraction and relaxation for proper pelvic floor motor control for labor. 2. Patient will be able to demonstrate good understanding of various laboring positions to encourage labor.   3. Patient will be independent with pain management strategies which can include exercise, stretching, use of maternity support belt, etc.       Plan  Patient would benefit from: skilled physical therapy  Planned modality interventions: cryotherapy and thermotherapy: hydrocollator packs  Planned therapy interventions: abdominal trunk stabilization, joint mobilization, manual therapy, activity modification, balance, balance/weight bearing training, neuromuscular re-education, body mechanics training, postural training, patient education, therapeutic activities, therapeutic exercise, functional ROM exercises, strengthening, stretching, transfer training, gait training and home exercise program  Frequency: 1-2x/week. Duration in weeks: 20  Plan of Care beginning date: 10/31/2023  Plan of Care expiration date: 3/19/2024  Treatment plan discussed with: patient and referring physician        PT Pelvic Floor Subjective:   History of Present Illness:   Patient is 20 weeks pregnant, KWAME 3/18/23. Birth plan: open  PMHx: POTS (pre-pregnancy: mostly managed by knowing her limits, and increasing sodium and water)    C/o neck tension/heaches, LBP    Neck:has headaches - not sure if dehydration vs POTS vs neck tension    Sleeping on side: uncomfortable sleeping on L, pre-pregnancy sleeps on stomach. Has a history of LBP from work related injury. Nothing specific that really bothers her back.     Walking: prior to pregnancy up to 13 miles, now she is winded in grocery store due to dizziness from POTS    Social Support:     Lives in:  [address] [place] [address] with:  Spouse    Relationship status: /committed    Work status: employed full time ([employer])  Diet and Exercise:      Exercise frequency: never  Co-morbidities:    POTS, endometriosis  OB/ gyn History    Gestational History:     Prior Pregnancy: Yes      Number of prior pregnancies: 2    Menstrual History:      Menstrual irregularities irregular menses    Painful periods: Difficulty managing menstrual pain (endometriosis)    Tolerates tampons: yes    Contraceptive use: previous IUD. Bladder Function:      Voiding Difficulties comments:     Voiding frequency: every 3-4 hours    Urinary leakage: urine leakage (minimal)    Urinary leakage aggravated by: coughing    Urinary leakage not aggravated by: sneezing    Nocturia (episodes per night): 3    Painful urination: No    Incontinence Management:     Pads/Diaper Use:  None  Bowel Function:     Voiding DIfficulties: constipation      Bowel Function comments:  Was recommended hydration, prunes, and stool softeners    Bowel frequency: more than every 4 days (pre-pregnancy: every 1-2 days)    [place] Stool Scale: type 1    Stool softener use: stool softeners    Enema use: no enema    Uses "squatty potty": no Squatty Potty  Sexual Function:     Sexually Active:  Sexually active  Pain:     Current pain rating:  3    At best pain ratin    At worst pain ratin    Exacerbated by: bending, sleeping on L side. Alleviating factors: massage, stretches. Treatments:     Current treatment: chiropractic    Patient Goals:     Patient goals for therapy:  Fully empty bladder or bowels, improved bladder or bowel function and improved pain management    Other patient goals: To help with headaches      Objective     Static Posture     Head  Forward. Palpation   Left   Tenderness of the suboccipitals. Right   Hypertonic in the suboccipitals. Tenderness of the suboccipitals.        Abdominal Assessment:        Diastatis   able to engage transverse abdominis                    Diagnosis: Pregnancy related back pain and neck pain   Precautions: pregnant, POTS, endometriosis    10/31          Vitals 122/84  HR 85   SpO2 97 *constipation management  *abdominal/hip/back strengthening         Patient Ed           Constipation management Increase water intake  Stool softener  Walking for activity          Sleeping Do not have to solely sleep on L side                                                                 Neuro Re-Ed           Hug the baby 10x                                                                            Ther Ex           Aerobic conditioning           Cat-cow 10x          Supine piriformis stretch Figure 4  3 x 10s          Suboccipital stretch Supine chin tuck with towel under mastoids  3 x 10s          No Money GTB  3 x 10                                           Ther Activity                                 Manual           Suboccipital release 5'                                                      Modalities

## 2023-10-31 NOTE — ASSESSMENT & PLAN NOTE
Kirsten eTrry  is a 29 y.o. Andrzej Flores @20w1d who presents for routine prenatal visit. Denies OB complaints. NIPT- low risk  MASFP- low risk  Level II scheduled for friday    Good fetal movement. Denies contractions, cramping, leakage of fluid or vaginal bleeding. Reviewed PTL precautions and reasons to call.

## 2023-11-02 ENCOUNTER — APPOINTMENT (OUTPATIENT)
Dept: PHYSICAL THERAPY | Facility: REHABILITATION | Age: 28
End: 2023-11-02
Payer: COMMERCIAL

## 2023-11-03 ENCOUNTER — ROUTINE PRENATAL (OUTPATIENT)
Dept: PERINATAL CARE | Facility: CLINIC | Age: 28
End: 2023-11-03
Payer: COMMERCIAL

## 2023-11-03 VITALS
SYSTOLIC BLOOD PRESSURE: 140 MMHG | BODY MASS INDEX: 39.05 KG/M2 | WEIGHT: 243 LBS | HEIGHT: 66 IN | HEART RATE: 100 BPM | DIASTOLIC BLOOD PRESSURE: 80 MMHG

## 2023-11-03 DIAGNOSIS — Z36.86 ENCOUNTER FOR ANTENATAL SCREENING FOR CERVICAL LENGTH: ICD-10-CM

## 2023-11-03 DIAGNOSIS — O99.212 OBESITY AFFECTING PREGNANCY IN SECOND TRIMESTER, UNSPECIFIED OBESITY TYPE: ICD-10-CM

## 2023-11-03 DIAGNOSIS — Z36.3 ENCOUNTER FOR ANTENATAL SCREENING FOR MALFORMATIONS: Primary | ICD-10-CM

## 2023-11-03 PROCEDURE — 76811 OB US DETAILED SNGL FETUS: CPT | Performed by: OBSTETRICS & GYNECOLOGY

## 2023-11-03 PROCEDURE — 76817 TRANSVAGINAL US OBSTETRIC: CPT | Performed by: OBSTETRICS & GYNECOLOGY

## 2023-11-03 PROCEDURE — 99213 OFFICE O/P EST LOW 20 MIN: CPT | Performed by: OBSTETRICS & GYNECOLOGY

## 2023-11-03 NOTE — PATIENT INSTRUCTIONS
Thank you for choosing us for your  care today. If you have any questions about your ultrasound or care, please do not hesitate to contact us or your primary obstetrician. Some general instructions for your pregnancy are:    Exercise: Aim for 22 minutes per day (150 minutes per week) of regular exercise. Walking is great! Nutrition: Choose healthy sources of calcium, iron, and protein. Learn about Preeclampsia: preeclampsia is a common, serious high blood pressure complication in pregnancy. A blood pressure of 351YRYS (systolic or top number) or 74ASAF (diastolic or bottom number) is not normal and needs evaluation by your doctor. Aspirin is sometimes prescribed in early pregnancy to prevent preeclampsia in women with risk factors - ask your obstetrician if you should be on this medication. For more resources, visit:  MapCoverage.fi  If you smoke, try to reduce how many cigarettes you smoke or try to quit completely. Do not vape. Other warning signs to watch out for in pregnancy or postpartum: chest pain, obstructed breathing or shortness of breath, seizures, thoughts of hurting yourself or your baby, bleeding, a painful or swollen leg, fever, or headache (see AWHON[employer] POST-BIRTH Warning Signs campaign). If these happen call 911. Itching is also not normal in pregnancy and if you experience this, especially over your hands and feet, potentially worse at night, notify your doctors.

## 2023-11-03 NOTE — PROGRESS NOTES
Ultrasound Probe Disinfection    A transvaginal ultrasound was performed. Prior to use, disinfection was performed with High Level Disinfection Process ([employer]). Probe serial number B1: [id number] was used.     Chaperone: Galina Montez, Medical Assistant  Joey Fofana RDMS

## 2023-11-06 NOTE — PROGRESS NOTES
[id number]  [facility] [place]: Ms. Germania Dean was seen today for anatomic survey and cervical length screening ultrasound. See ultrasound report under "OB Procedures" tab. The time spent on this established patient on the encounter date included 5 minutes previsit service time reviewing records and precharting, 10 minutes face-to-face service time counseling regarding results and coordinating care, and  5 minutes charting, totalling 20 minutes. Please don't hesitate to contact our office with any concerns or questions.   Juancho Schafer MD

## 2023-11-09 ENCOUNTER — OFFICE VISIT (OUTPATIENT)
Dept: PHYSICAL THERAPY | Facility: REHABILITATION | Age: 28
End: 2023-11-09
Payer: COMMERCIAL

## 2023-11-09 DIAGNOSIS — O99.891 BACK PAIN AFFECTING PREGNANCY IN SECOND TRIMESTER: Primary | ICD-10-CM

## 2023-11-09 DIAGNOSIS — M54.9 BACK PAIN AFFECTING PREGNANCY IN SECOND TRIMESTER: Primary | ICD-10-CM

## 2023-11-09 PROCEDURE — 97110 THERAPEUTIC EXERCISES: CPT

## 2023-11-09 PROCEDURE — 97140 MANUAL THERAPY 1/> REGIONS: CPT

## 2023-11-09 NOTE — PROGRESS NOTES
Daily Note     Today's date: 2023  Patient name: Genny Soulier  : 1995  MRN: [id number]  Referring provider: Tia Dubin,*  Dx:   Encounter Diagnosis     ICD-10-CM    1. Back pain affecting pregnancy in second trimester  O99.891     M54.9           Start Time: 1630  Stop Time: 8376  Total time in clinic (min): 45 minutes    Subjective: Pt is not feeling well today, an ongoing HA 5/10, back pain and difficulty with her bowels. Needs to strain, is using the stool and taking a stool softener daily which they have encouraged her to take it twice a day. Objective: See treatment diary below    /76 L UE in seated position      Assessment: Tolerated treatment well. Patient would benefit from continued PT. Due to p't symptoms, focussed more on manual techniques. Cervical work relieved her back but aggravated her back. Appreciated the pregnancy pillow. BP monitored due to recent elevated reading and as per our protocol. Plan: Continue per plan of care. Next session focus more on exercises if we are able to.      Diagnosis: Pregnancy related back pain and neck pain   Precautions: pregnant, POTS, endometriosis    10/31 11/09         Vitals 122/84  HR 85   SpO2 97 *constipation management  *abdominal/hip/back strengthening         Patient Ed           Constipation management Increase water intake  Stool softener  Walking for activity Constipation recipe         Sleeping Do not have to solely sleep on L side                                                                 Neuro Re-Ed           Hug the baby 10x 10x especially with mat transfers                                                                           Ther Ex           Aerobic conditioning           Cat-cow 10x 10x         Supine piriformis stretch Figure 4  3 x 10s          Suboccipital stretch Supine chin tuck with towel under mastoids  3 x 10s 3x10         No Money GTB  3 x 10 GTB 3x10 Ther Activity                                 Manual           Suboccipital release 5' 10'         L/s paraspinals pregnancy pillow  10'                                          Modalities

## 2023-11-10 ENCOUNTER — PATIENT MESSAGE (OUTPATIENT)
Dept: OBGYN CLINIC | Facility: CLINIC | Age: 28
End: 2023-11-10

## 2023-11-10 DIAGNOSIS — O26.899 HEADACHE IN PREGNANCY, ANTEPARTUM: Primary | ICD-10-CM

## 2023-11-10 DIAGNOSIS — R51.9 HEADACHE IN PREGNANCY, ANTEPARTUM: Primary | ICD-10-CM

## 2023-11-10 RX ORDER — MAGNESIUM OXIDE 400 MG/1
400 TABLET ORAL DAILY
Qty: 90 TABLET | Refills: 1 | Status: SHIPPED | OUTPATIENT
Start: 2023-11-10

## 2023-11-14 ENCOUNTER — ROUTINE PRENATAL (OUTPATIENT)
Dept: OBGYN CLINIC | Facility: CLINIC | Age: 28
End: 2023-11-14
Payer: COMMERCIAL

## 2023-11-14 VITALS
DIASTOLIC BLOOD PRESSURE: 72 MMHG | SYSTOLIC BLOOD PRESSURE: 120 MMHG | OXYGEN SATURATION: 98 % | HEART RATE: 95 BPM | WEIGHT: 247 LBS | BODY MASS INDEX: 39.87 KG/M2

## 2023-11-14 DIAGNOSIS — E66.01 SEVERE OBESITY DUE TO EXCESS CALORIES AFFECTING PREGNANCY IN SECOND TRIMESTER (HCC): ICD-10-CM

## 2023-11-14 DIAGNOSIS — G43.709 CHRONIC MIGRAINE WITHOUT AURA WITHOUT STATUS MIGRAINOSUS, NOT INTRACTABLE: ICD-10-CM

## 2023-11-14 DIAGNOSIS — R03.0 ELEVATED BP WITHOUT DIAGNOSIS OF HYPERTENSION: ICD-10-CM

## 2023-11-14 DIAGNOSIS — Z34.92 PRENATAL CARE IN SECOND TRIMESTER: Primary | ICD-10-CM

## 2023-11-14 DIAGNOSIS — O99.212 SEVERE OBESITY DUE TO EXCESS CALORIES AFFECTING PREGNANCY IN SECOND TRIMESTER (HCC): ICD-10-CM

## 2023-11-14 DIAGNOSIS — Z3A.22 22 WEEKS GESTATION OF PREGNANCY: ICD-10-CM

## 2023-11-14 LAB
SL AMB  POCT GLUCOSE, UA: NORMAL
SL AMB POCT URINE PROTEIN: NORMAL

## 2023-11-14 PROCEDURE — 81002 URINALYSIS NONAUTO W/O SCOPE: CPT | Performed by: STUDENT IN AN ORGANIZED HEALTH CARE EDUCATION/TRAINING PROGRAM

## 2023-11-14 PROCEDURE — PNV: Performed by: STUDENT IN AN ORGANIZED HEALTH CARE EDUCATION/TRAINING PROGRAM

## 2023-11-14 NOTE — ASSESSMENT & PLAN NOTE
29yo  at 22.1wks presents for problem visit. Reports occasional cramping resolved with water intake and tylenol. Reports constipation that is now diarrhea due to colace. Reports rash noted on hands that is dry and burning in nature.  Also reports increased breast pain with friction and movement.     -colace every other day   -benadryl for rash   -tylenol for breast pain and cramping as needed   -funneling noted on prior MFM US, repeat scheduled for   - labor precautions provided   -follow up in 3 weeks

## 2023-11-14 NOTE — ASSESSMENT & PLAN NOTE
-elevated BP at prior visit, occasionally monitors BP at home   -BP today within normal limits  -asymptomatic   -CMP UP:C ordered   -preeclampsia precautions provided

## 2023-11-14 NOTE — PROGRESS NOTES
22 weeks gestation of pregnancy  33yo  at 22.1wks presents for problem visit. Reports occasional cramping resolved with water intake and tylenol. Reports constipation that is now diarrhea due to colace. Reports rash noted on hands that is dry and burning in nature.  Also reports increased breast pain with friction and movement.     -colace every other day   -benadryl for rash   -tylenol for breast pain and cramping as needed   -funneling noted on prior MFM US, repeat scheduled for   - labor precautions provided   -follow up in 3 weeks     Elevated BP without diagnosis of hypertension  -elevated BP at prior visit, occasionally monitors BP at home   -BP today within normal limits  -asymptomatic   -CMP UP:C ordered   -preeclampsia precautions provided    Obesity affecting pregnancy in second trimester  -continue ASA

## 2023-11-16 ENCOUNTER — OFFICE VISIT (OUTPATIENT)
Dept: PHYSICAL THERAPY | Facility: REHABILITATION | Age: 28
End: 2023-11-16
Payer: COMMERCIAL

## 2023-11-16 DIAGNOSIS — M54.9 BACK PAIN AFFECTING PREGNANCY IN SECOND TRIMESTER: Primary | ICD-10-CM

## 2023-11-16 DIAGNOSIS — O99.891 BACK PAIN AFFECTING PREGNANCY IN SECOND TRIMESTER: Primary | ICD-10-CM

## 2023-11-16 PROCEDURE — 97140 MANUAL THERAPY 1/> REGIONS: CPT

## 2023-11-16 PROCEDURE — 97110 THERAPEUTIC EXERCISES: CPT

## 2023-11-16 NOTE — PROGRESS NOTES
Daily Note     Today's date: 2023  Patient name: Sean Starr  : 1995  MRN: [id number]  Referring provider: Salvatore Krueger,*  Dx:   Encounter Diagnosis     ICD-10-CM    1. Back pain affecting pregnancy in second trimester  O99.891     M54.9             Start Time: 1630  Stop Time: 9476  Total time in clinic (min): 45 minutes    Subjective: 7/10 HA but has passed her bowels. HA decreased to a 3/10. Objective: See treatment diary below    Assessment: Tolerated treatment well. Patient would benefit from continued PT. Performed stretching & quadriped stretches in addition to multifidus rotation on pball. Showed her some exercises on the physio ball, discuss she may benefit from purchasing one. It could also help as a gentle bowel massage with circles. Access Code: [email or web address]  URL: [email or web address]  Date: 2023  Prepared by: Malka Ballard    Program Notes  65 cm    Exercises  - Seated Thoracic Flexion and Rotation with Swiss Ball  - 1 x daily - 7 x weekly - 2 sets - 10 reps - 5 hold  - Pelvic Tilt on Swiss Ball  - 1 x daily - 7 x weekly - 2 sets - 10 reps - 5 hold  - Seated Lateral Pelvic Tilt on Swiss Ball  - 1 x daily - 7 x weekly - 2 sets - 10 reps - 5 hold  - Pelvic Circles on Swiss Ball  - 1 x daily - 7 x weekly - 2 sets - 10 reps - 5 hold  - Kneeling Thoracic Extension Stretch with Swiss Ball  - 1 x daily - 7 x weekly - 2 sets - 10 reps - 5 hold  Within 8 weeks,   1. Patient will demonstrate proper TrA contraction. 2. Patient will demonstrate proper diaphragmatic breathing. 3. Patient will be able to have bowel movements at last every 3 days. 4. Patient will be able to report no urinary leakage with coughing. Within 16 weeks or by discharge,   1. Patient will demonstrate proper pelvic floor muscle contraction and relaxation for proper pelvic floor motor control for labor.   2. Patient will be able to demonstrate good understanding of various laboring positions to encourage labor. 3. Patient will be independent with pain management strategies which can include exercise, stretching, use of maternity support belt, etc.          Plan: Continue per plan of care.        Diagnosis: Pregnancy related back pain and neck pain   Precautions: pregnant, POTS, endometriosis    10/31 11/09 11/16        Vitals 122/84  HR 85   SpO2 97 *constipation management  *abdominal/hip/back strengthening         Patient Ed           Constipation management Increase water intake  Stool softener  Walking for activity Constipation recipe         Sleeping Do not have to solely sleep on L side                                                                 Neuro Re-Ed           Hug the baby 10x 10x especially with mat transfers                                                                           Ther Ex           Aerobic conditioning   L5 8'        Cat-cow 10x 10x 10x        Thread the needle   5x        Prayer stretch           Supine piriformis stretch Figure 4  3 x 10s  3x10"        Suboccipital stretch Supine chin tuck with towel under mastoids  3 x 10s 3x10         No Money GTB  3 x 10 GTB 3x10         Pball   CW, CCW, stretches, pelvic tilts multifidus rotation YCO (20 of each)                              Ther Activity                                 Manual           Suboccipital release 5' 10' 15'        L/s paraspinals pregnancy pillow  10'                                          Modalities

## 2023-11-20 ENCOUNTER — ULTRASOUND (OUTPATIENT)
Dept: PERINATAL CARE | Facility: OTHER | Age: 28
End: 2023-11-20
Payer: COMMERCIAL

## 2023-11-20 VITALS
BODY MASS INDEX: 38.83 KG/M2 | HEART RATE: 99 BPM | WEIGHT: 247.4 LBS | SYSTOLIC BLOOD PRESSURE: 128 MMHG | DIASTOLIC BLOOD PRESSURE: 72 MMHG | HEIGHT: 67 IN

## 2023-11-20 DIAGNOSIS — Z03.75: Primary | ICD-10-CM

## 2023-11-20 DIAGNOSIS — Z3A.23 23 WEEKS GESTATION OF PREGNANCY: ICD-10-CM

## 2023-11-20 PROCEDURE — 76817 TRANSVAGINAL US OBSTETRIC: CPT | Performed by: OBSTETRICS & GYNECOLOGY

## 2023-11-20 NOTE — PROGRESS NOTES
Ultrasound Probe Disinfection    A transvaginal ultrasound was performed. Prior to use, disinfection was performed with High Level Disinfection Process ([employer]). Probe serial number M3: [id number] was used.       Jade Gonzalez  11/20/23  1:58 PM

## 2023-11-21 ENCOUNTER — APPOINTMENT (OUTPATIENT)
Dept: PHYSICAL THERAPY | Facility: REHABILITATION | Age: 28
End: 2023-11-21
Payer: COMMERCIAL

## 2023-11-21 ENCOUNTER — APPOINTMENT (OUTPATIENT)
Dept: LAB | Facility: CLINIC | Age: 28
End: 2023-11-21
Payer: COMMERCIAL

## 2023-11-21 DIAGNOSIS — R03.0 ELEVATED BP WITHOUT DIAGNOSIS OF HYPERTENSION: ICD-10-CM

## 2023-11-22 NOTE — PROGRESS NOTES
The patient was seen today for an ultrasound. Please see ultrasound report (located under Ob Procedures) for additional details. Thank you very much for allowing us to participate in the care of this very nice patient. Should you have any questions, please do not hesitate to contact me. Shahbaz Walton [place] [id number] [facility]  Attending Physician, [address] [place] [address]

## 2023-11-26 PROBLEM — O26.892 RH NEGATIVE STATE IN ANTEPARTUM PERIOD, SECOND TRIMESTER: Status: ACTIVE | Noted: 2023-09-07

## 2023-11-26 PROBLEM — Z3A.24 24 WEEKS GESTATION OF PREGNANCY: Status: ACTIVE | Noted: 2023-09-10

## 2023-11-26 PROBLEM — Z34.90 SUPERVISION OF NORMAL PREGNANCY: Status: ACTIVE | Noted: 2023-11-26

## 2023-11-27 ENCOUNTER — ROUTINE PRENATAL (OUTPATIENT)
Dept: OBGYN CLINIC | Facility: CLINIC | Age: 28
End: 2023-11-27
Payer: COMMERCIAL

## 2023-11-27 VITALS
SYSTOLIC BLOOD PRESSURE: 112 MMHG | HEART RATE: 95 BPM | HEIGHT: 66 IN | DIASTOLIC BLOOD PRESSURE: 62 MMHG | BODY MASS INDEX: 39.86 KG/M2 | WEIGHT: 248 LBS

## 2023-11-27 DIAGNOSIS — J45.20 MILD INTERMITTENT ASTHMA WITHOUT COMPLICATION: ICD-10-CM

## 2023-11-27 DIAGNOSIS — O99.810 ABNORMAL GLUCOSE AFFECTING PREGNANCY: ICD-10-CM

## 2023-11-27 DIAGNOSIS — Z34.82 ENCOUNTER FOR SUPERVISION OF OTHER NORMAL PREGNANCY IN SECOND TRIMESTER: Primary | ICD-10-CM

## 2023-11-27 DIAGNOSIS — Z3A.24 24 WEEKS GESTATION OF PREGNANCY: ICD-10-CM

## 2023-11-27 DIAGNOSIS — F43.10 PTSD (POST-TRAUMATIC STRESS DISORDER): ICD-10-CM

## 2023-11-27 DIAGNOSIS — O99.212 OBESITY AFFECTING PREGNANCY IN SECOND TRIMESTER, UNSPECIFIED OBESITY TYPE: ICD-10-CM

## 2023-11-27 DIAGNOSIS — O26.892 RH NEGATIVE STATE IN ANTEPARTUM PERIOD, SECOND TRIMESTER: ICD-10-CM

## 2023-11-27 DIAGNOSIS — R03.0 ELEVATED BP WITHOUT DIAGNOSIS OF HYPERTENSION: ICD-10-CM

## 2023-11-27 DIAGNOSIS — Z67.91 RH NEGATIVE STATE IN ANTEPARTUM PERIOD, SECOND TRIMESTER: ICD-10-CM

## 2023-11-27 DIAGNOSIS — O20.9 VAGINAL BLEEDING IN PREGNANCY, FIRST TRIMESTER: ICD-10-CM

## 2023-11-27 DIAGNOSIS — O99.891 BACK PAIN AFFECTING PREGNANCY IN SECOND TRIMESTER: ICD-10-CM

## 2023-11-27 DIAGNOSIS — M54.9 BACK PAIN AFFECTING PREGNANCY IN SECOND TRIMESTER: ICD-10-CM

## 2023-11-27 LAB
SL AMB  POCT GLUCOSE, UA: NEGATIVE
SL AMB POCT URINE PROTEIN: ABNORMAL

## 2023-11-27 PROCEDURE — PNV: Performed by: NURSE PRACTITIONER

## 2023-11-27 PROCEDURE — 81002 URINALYSIS NONAUTO W/O SCOPE: CPT | Performed by: NURSE PRACTITIONER

## 2023-11-27 RX ORDER — LANOLIN ALCOHOL/MO/W.PET/CERES
400 CREAM (GRAM) TOPICAL DAILY
COMMUNITY
Start: 2023-11-10

## 2023-11-27 NOTE — PATIENT INSTRUCTIONS
Kick Counts in Pregnancy   WHAT YOU NEED TO KNOW:   Kick counts measure how much your baby is moving in your womb. A kick from your baby can be felt as a twist, turn, swish, roll, or jab. It is common to feel your baby kicking at 26 to 28 weeks of pregnancy. You may feel your baby kick as early as 20 weeks of pregnancy. You may want to start counting at 28 weeks. DISCHARGE INSTRUCTIONS:   Contact your doctor immediately if:   You feel a change in the number of kicks or movements of your baby. You feel fewer than 10 kicks within 2 hours. You have questions or concerns about your baby's movements. Why measure kick counts:  Your baby's movement may provide information about your baby's health. He or she may move less, or not at all, if there are problems. Your baby may move less if he or she is not getting enough oxygen or nutrition from the placenta. Do not smoke while you are pregnant. Smoking decreases the amount of oxygen that gets to your baby. Talk to your healthcare provider if you need help to quit smoking. Tell your healthcare provider as soon as you feel a change in your baby's movements. When to measure kick counts:   Measure kick counts at the same time every day. Measure kick counts when your baby is awake and most active. Your baby may be most active in the evening. How to measure kick counts:  Check that your baby is awake before you measure kick counts. You can wake up your baby by lightly pushing on your belly, walking, or drinking something cold. Your healthcare provider may tell you different ways to measure kick counts. You may be told to do the following:  Use a chart or clock to keep track of the time you start and finish counting. Sit in a chair or lie on your left side. Place your hands on the largest part of your belly. Count until you reach 10 kicks. Write down how much time it takes to count 10 kicks. It may take 30 minutes to 2 hours to count 10 kicks. It should not take more than 2 hours to count 10 kicks. Follow up with your doctor as directed:  Write down your questions so you remember to ask them during your visits. © Copyright Raz Baltazar 2023 Information is for End User's use only and may not be sold, redistributed or otherwise used for commercial purposes. The above information is an  only. It is not intended as medical advice for individual conditions or treatments. Talk to your doctor, nurse or pharmacist before following any medical regimen to see if it is safe and effective for you.

## 2023-11-27 NOTE — ASSESSMENT & PLAN NOTE
TWG # 10  Early GCT elevated at 148 on 8/26/23. GTT 3/4 normal on 9/2/23. Repeat between 24-28 weeks. Limit weight gain to 11-20 lbs. Regular exercise encouraged. Low dose  mg po daily encouraged to reduce risk of pre E and adverse pregnancy outcomes. Fetal growth scans every 4-6 weeks in 3rd trimester.

## 2023-11-27 NOTE — PROGRESS NOTES
Problem   Elevated Bp Without Diagnosis of Hypertension   Back Pain Affecting Pregnancy in Second Trimester   24 Weeks Gestation of Pregnancy    First OB labs - complete passed early 3 hour  Gc/chlamydia - collected  Pap - due in postpartum  Blue folder - given    Genetic screening -   AFP -screen negative     28 week labs -ordered 23  COVID vaccine -   TDAP -   Delivery consent -   Yellow folder -     Breast pump -   Pediatrician -   Perineal massage -   GBS -        Vaginal Bleeding in Pregnancy, First Trimester   Abnormal Glucose Affecting Pregnancy   Obesity Affecting Pregnancy in Second Trimester   Rh Negative State in Antepartum Period, Second Trimester   Ptsd (Post-Traumatic Stress Disorder)   Asthma     24 weeks gestation of pregnancy  Carmen Jung is a 29 y.o. Juanda Gauze here for OB visit at 24w0d weeks accompanied by  Bell. TWG 4.536 kg (10 lb). No health concerns. Denies LOF, vaginal bleeding with rare contractions. "Once every few days". States they were noted on her US at [address] last week. 28 week labs ordered. TDAP at  next appt  Influenza vaccine received  Rhogam received 23 and due at next appt. Covid booster encouraged. MFM appt on 23  Childbirth class recommended. Evans class and hospital tour are planned. RTO 4 weeks    Elevated BP without diagnosis of hypertension  Normotensive  Asymptomatic  CMP and protein/creat ratio is outstanding. Will complete with 28 week labs. Rh negative state in antepartum period, second trimester  Received rhogam on 23  Due at next appt. Obesity affecting pregnancy in second trimester  TWG # 10  Early GCT elevated at 148 on 23. GTT 3/4 normal on 23. Repeat between 24-28 weeks. Limit weight gain to 11-20 lbs. Regular exercise encouraged. Low dose  mg po daily encouraged to reduce risk of pre E and adverse pregnancy outcomes. Fetal growth scans every 4-6 weeks in 3rd trimester.     Abnormal glucose affecting pregnancy  Normal GTT on 9/2/23. Repeat GCT ordered. PTSD (post-traumatic stress disorder)  Negative 2 question depression screen      Vaginal bleeding in pregnancy, first trimester  Denies. Rhogam on 9/7/23. Asthma  Seasonal and stable   Last flare was 5/23. Has rescue inhaler. Back pain affecting pregnancy in second trimester  Follows with PT once weekly. Exercises are helping. Plans to get an exercise ball as suggested.

## 2023-11-27 NOTE — ASSESSMENT & PLAN NOTE
Normotensive  Asymptomatic  CMP and protein/creat ratio is outstanding. Will complete with 28 week labs.

## 2023-11-27 NOTE — ASSESSMENT & PLAN NOTE
Chyna Randhawa is a 29 y.o.  here for OB visit at 24w0d weeks accompanied by  Patti Avery. TWG 4.536 kg (10 lb). No health concerns. Denies LOF, vaginal bleeding with rare contractions. "Once every few days". States they were noted on her US at [address] last week. 28 week labs ordered. TDAP at  next appt  Influenza vaccine received  Rhogam received 23 and due at next appt. Covid booster encouraged. MFM appt on 23  Childbirth class recommended. Leonore class and hospital tour are planned.    RTO 4 weeks

## 2023-11-28 ENCOUNTER — OFFICE VISIT (OUTPATIENT)
Dept: PHYSICAL THERAPY | Facility: REHABILITATION | Age: 28
End: 2023-11-28
Payer: COMMERCIAL

## 2023-11-28 DIAGNOSIS — O99.891 BACK PAIN AFFECTING PREGNANCY IN SECOND TRIMESTER: Primary | ICD-10-CM

## 2023-11-28 DIAGNOSIS — M54.9 BACK PAIN AFFECTING PREGNANCY IN SECOND TRIMESTER: Primary | ICD-10-CM

## 2023-11-28 PROCEDURE — 97110 THERAPEUTIC EXERCISES: CPT

## 2023-11-28 PROCEDURE — 97140 MANUAL THERAPY 1/> REGIONS: CPT

## 2023-11-28 NOTE — PROGRESS NOTES
Daily Note     Today's date: 2023  Patient name: Kira Wynn  : 1995  MRN: [id number]  Referring provider: Adry Vaughn,*  Dx:   Encounter Diagnosis     ICD-10-CM    1. Back pain affecting pregnancy in second trimester  O99.891     M54.9                      Subjective:  Patient noted that it is getting uncomfortable for her to perform cat cow and figure 4 stretch in HEP due to pregnancy. Objective: See treatment diary below      Assessment: Tolerated treatment fair. Due to patient noting discomfort with cat cow position in Q ped and figure 4 stretch modified to trial cat cow in standing and piriformis stretch in standing with foot proped on stool or higher surface. Patient noted continued pain with cat cow therefore discussed with patient to hold off performing cat cow exercise. Patient did not feel stretch with seated position for piriformis, trailed in standing position with foot on high low table set at lower setting with a chair on either side of patient and noted that she felt a stretch in piriformis with no increased pain. Discussed with patient if trailing at home to make sure she is holding on to stable surface while performing for safety. Also modified to have patient perform thread the needle in standing plantigrade. Plan: Continue per plan of care.       Diagnosis: Pregnancy related back pain and neck pain   Precautions: pregnant, POTS, endometriosis    10/31 11/09 11/16 11/28       Vitals 122/84  HR 85   SpO2 97 *constipation management  *abdominal/hip/back strengthening         Patient Ed           Constipation management Increase water intake  Stool softener  Walking for activity Constipation recipe         Sleeping Do not have to solely sleep on L side                                                                 Neuro Re-Ed           Hug the baby 10x 10x especially with mat transfers Ther Ex           Aerobic conditioning   L5 8' L5 8'       Cat-cow 10x 10x 10x 10 x  mod standing still P! Thread the needle   5x 5 x        Prayer stretch           Supine piriformis stretch Figure 4  3 x 10s  3x10" 3 x 10'' mod seated p!  Standing performed        Suboccipital stretch Supine chin tuck with towel under mastoids  3 x 10s 3x10         No Money GTB  3 x 10 GTB 3x10         Pball   CW, CCW, stretches, pelvic tilts multifidus rotation YCO (20 of each) CW, CCW, stretches, pelvic tilts multifidus rotation YCO (20 of each)                             Ther Activity                                 Manual           Suboccipital release 5' 10' 15' 15'       L/s paraspinals pregnancy pillow  10'                                          Modalities

## 2023-11-30 ENCOUNTER — OFFICE VISIT (OUTPATIENT)
Dept: CARDIOLOGY CLINIC | Facility: CLINIC | Age: 28
End: 2023-11-30
Payer: COMMERCIAL

## 2023-11-30 VITALS
BODY MASS INDEX: 39.86 KG/M2 | SYSTOLIC BLOOD PRESSURE: 106 MMHG | WEIGHT: 248 LBS | HEIGHT: 66 IN | HEART RATE: 82 BPM | DIASTOLIC BLOOD PRESSURE: 70 MMHG

## 2023-11-30 DIAGNOSIS — Z3A.12 12 WEEKS GESTATION OF PREGNANCY: ICD-10-CM

## 2023-11-30 DIAGNOSIS — G90.A POTS (POSTURAL ORTHOSTATIC TACHYCARDIA SYNDROME): ICD-10-CM

## 2023-11-30 PROCEDURE — 99243 OFF/OP CNSLTJ NEW/EST LOW 30: CPT | Performed by: INTERNAL MEDICINE

## 2023-11-30 NOTE — PROGRESS NOTES
Cardiology Consultation     Jeremy Aden  [id number]  1995  [facility]ART [facility]ASC[facility] [address]  [address] [address] [facility] [address] [address]      Diagnoses and all orders for this visit:    POTS (postural orthostatic tachycardia syndrome)  -     Ambulatory Referral to Cardiology  -     Compression Stocking    12 weeks gestation of pregnancy  -     Ambulatory Referral to Cardiology      I had the pleasure of seeing Jeremy Aden for a consultation regarding POTS requested by     History of the Presenting Illness, Discussion/Summary and my Plan are as follows:::    Jey Malik is a pleasant 75-year-old without hypertension, diabetes, dyslipidemia but has a diagnosis of POTS made around 1642-4363 at [facility] after tilt table test.  She has had dizzy episodes since the age of 12-13, presyncope at 25 leading to further testing and the above diagnosis. For this she had followed with Dr. Adore Gunn at [facility] in [place] where she [place] from. Her symptoms persisted despite trying increased hydration and sodium and was initiated on midodrine which was increased to 7.5 mg 3 times a day, ultimately this was discontinued in July 2020 after a televisit, she feels that she never saw much improvement with midodrine and in fact might have had side effects of tingling in her scalp. Currently she is not on any medications, symptoms (usually a cluster of palpitations, dizziness, brain fog and shortness of breath, not particularly with position change, usually random )are about twice a week, previously about once every 2 weeks. She is currently pregnant-about 25 weeks. She is hydrating herself with close to 64 ounces of fluid a day, actively adds salt to her diet, caffeine intake is minimal.  She is also been receiving physical therapy for neck and back pain during pregnancy.     Current exam is unremarkable and orthostatics were negative-supine 130/70/90, sitting 124/70/95, standing 120/70/97    There are no medications that are permitted with no risk during pregnancy for POTS. Plan:    POTS: Diagnosis made after tilt table test around 2018 at [facility]. Symptoms are not typical-see above. Does feel worse from kneeling to standing such as at [employer]. Had tried midodrine in the past up to 7.5 3 times daily without significant relief  Please symptoms while increased during pregnancy is still manageable-occurring about twice a week with no recent syncopal episodes or presyncopal episodes  Could add Magnus protocol if feasible as long as it is since she is currently pregnant, along with her current ongoing physical therapy  Increase hydration to up to 80 ounces, the rest from her current 59 ounces-can use free water since she is also developing some edema in her legs  No medications at this time  Compression stockings-for now she would prefer knee-high compression stockings-prescription made-she will contact [facility]    Follow-up in 6 months.        1. POTS (postural orthostatic tachycardia syndrome)  Ambulatory Referral to Cardiology    Compression Stocking      2. 12 weeks gestation of pregnancy  Ambulatory Referral to Cardiology        Patient Active Problem List   Diagnosis    Allergic rhinitis    Asthma    PTSD (post-traumatic stress disorder)    Endometriosis    Peripheral polyneuropathy    POTS (postural orthostatic tachycardia syndrome)    Chronic migraine without aura without status migrainosus, not intractable    Gross hematuria    Chronic neck pain    Vaginal bleeding in pregnancy, first trimester    Abnormal glucose affecting pregnancy    Obesity affecting pregnancy in second trimester    Rh negative state in antepartum period, second trimester    24 weeks gestation of pregnancy    ASCUS with positive high risk HPV cervical    Candida infection of flexural skin    Vulvovaginal candidiasis    Back pain affecting pregnancy in second trimester    Elevated BP without diagnosis of hypertension    Supervision of normal pregnancy    12 weeks gestation of pregnancy     Past Medical History:   Diagnosis Date    Abnormal Pap smear of cervix 01/26/2023    ASCUS HPV other    Allergic rhinitis     Anxiety     Asthma     Cyst of right ovary 03/08/2022    Depression     well managed with therapy    Ectopic pregnancy 03/08/2022    Questional cyst vs ectopic    Endometriosis     Intractable migraine with status migrainosus 10/08/2022    Miscarriage 02/2022    POTS (postural orthostatic tachycardia syndrome)     PTSD (post-traumatic stress disorder)     S/P D&C (status post dilation and curettage) 03/13/2022    S/P ovarian cystectomy 03/13/2022     Social History     Socioeconomic History    Marital status: /Civil Union     Spouse name: Not on file    Number of children: Not on file    Years of education: Not on file    Highest education level: Not on file   Occupational History    Not on file   Tobacco Use    Smoking status: Never    Smokeless tobacco: Never   Vaping Use    Vaping Use: Never used   Substance and Sexual Activity    Alcohol use: Not Currently    Drug use: Never    Sexual activity: Yes     Partners: Male     Birth control/protection: None   Other Topics Concern    Not on file   Social History Narrative    ** Merged History Encounter **          Social Determinants of Health     Financial Resource Strain: Not on file   Food Insecurity: Not on file   Transportation Needs: Not on file   Physical Activity: Not on file   Stress: Not on file   Social Connections: Not on file   Intimate Partner Violence: Not on file   Housing Stability: Not on file      Family History   Problem Relation Age of Onset    Other Mother         hx od traumatic MVA-disabled now and partially paralized    Leukemia Maternal Grandmother 66    Stroke Maternal Grandmother     Heart attack Maternal Grandmother     No Known Problems Half-Sister     No Known Problems Half-Sister      Past Surgical History:   Procedure Laterality Date    DILATION AND CURETTAGE OF UTERUS N/A 03/08/2022    Procedure: DILATATION AND CURETTAGE (D&C); Surgeon: Monica Duran MD;  Location: [facility];  Service: Gynecology    LAPAROSCOPIC ENDOMETRIOSIS FULGURATION  03/03/2021    PA LAPS ABD PRTM&OMENTUM DX W/WO SPEC BR/WA SPX N/A 03/08/2022    Procedure: LAPAROSCOPY DIAGNOSTIC, right ovarian cystectomy;  Surgeon: Monica Duran MD;  Location[facility];  Service: Gynecology       Current Outpatient Medications:     albuterol (2.5 mg/3 mL) 0.083 % nebulizer solution, Take 3 mL (2.5 mg total) by nebulization every 6 (six) hours as needed for wheezing or shortness of breath, Disp: 90 mL, Rfl: 0    albuterol (ProAir HFA) 90 mcg/act inhaler, Inhale 2 puffs every 6 (six) hours as needed for wheezing, Disp: 8.5 g, Rfl: 0    aspirin 81 mg chewable tablet, Chew 2 tablets (162 mg total) daily, Disp: 180 tablet, Rfl: 3    magnesium oxide (MAG-OX) 400 mg tablet, Take 1 tablet (400 mg total) by mouth daily, Disp: 90 tablet, Rfl: 1    magnesium Oxide (MAG-OX) 400 mg TABS, Take 400 mg by mouth daily, Disp: , Rfl:     nystatin (MYCOSTATIN) powder, Apply topically 3 (three) times a day, Disp: 30 g, Rfl: 0    Prenatal Vit-Fe Fumarate-FA (PRENATAL PO), Take by mouth, Disp: , Rfl:   No Known Allergies  Vitals:    11/30/23 1021   BP: 106/70   BP Location: Right arm   Patient Position: Sitting   Cuff Size: Large   Pulse: 82   Weight: 112 kg (248 lb)   Height: 5' 6.25" (1.683 m)         Imaging: No results found. Review of Systems:  Review of Systems   Constitutional: Negative. HENT: Negative. Eyes: Negative. Respiratory: Negative. Cardiovascular: Negative. Gastrointestinal: Negative. Endocrine: Negative. Genitourinary:  Negative for vaginal pain. Musculoskeletal: Negative. Neurological:  Positive for dizziness.        Physical Exam:    /70 (BP Location: Right arm, Patient Position: Sitting, Cuff Size: Large)   Pulse 82   Ht 5' 6.25" (1.683 m)   Wt 112 kg (248 lb)   LMP 06/12/2023 (Exact Date)   BMI 39.73 kg/m²   Physical Exam  Constitutional:       General: She is not in acute distress. Appearance: She is not ill-appearing, toxic-appearing or diaphoretic. HENT:      Nose: Nose normal. No congestion or rhinorrhea. Eyes:      General:         Right eye: No discharge. Left eye: No discharge. Conjunctiva/sclera: Conjunctivae normal.      Pupils: Pupils are equal, round, and reactive to light. Neck:      Vascular: No carotid bruit. Cardiovascular:      Rate and Rhythm: Normal rate and regular rhythm. Pulses: Normal pulses. Heart sounds: No murmur heard. Pulmonary:      Effort: Pulmonary effort is normal. No respiratory distress. Breath sounds: No stridor. No wheezing or rhonchi. Abdominal:      General: Abdomen is flat. Bowel sounds are normal. There is no distension. Palpations: There is no mass. Tenderness: There is no abdominal tenderness. Hernia: No hernia is present. Musculoskeletal:         General: No swelling, tenderness, deformity or signs of injury. Normal range of motion. Cervical back: Normal range of motion. No rigidity or tenderness. Lymphadenopathy:      Cervical: No cervical adenopathy. Skin:     General: Skin is warm. Coloration: Skin is not jaundiced or pale. Findings: No bruising or erythema. Neurological:      Mental Status: She is alert. This note was completed in part utilizing [employer] direct voice recognition software. Grammatical errors, random word insertion, spelling mistakes, occasional wrong word or "sound-alike" substitutions and incomplete sentences may be an occasional consequence of the system secondary to software limitations, ambient noise and hardware issues.  At the time of dictation, efforts were made to edit, clarify and /or correct errors. Please read the chart carefully and recognize, using context, where substitutions have occurred. If you have any questions or concerns about the context, text or information contained within the body of this dictation, please contact myself, the provider, for further clarification.

## 2023-12-04 NOTE — PROGRESS NOTES
Daily Note     Today's date: 2023  Patient name: Carmen Jung  : 1995  MRN: [id number]  Referring provider: Maryellen Cain,*  Dx:   Encounter Diagnosis     ICD-10-CM    1. Back pain affecting pregnancy in second trimester  O99.891     M54.9             Start Time: 1630  Stop Time: 8264  Total time in clinic (min): 45 minutes    Subjective: Using stool softener daily -- helps bowel frequency but still not great. Using Mg for restless legs, constipation, and headaches. Back pain is ok, notices that she has to get up often. Able to walk longer distances, with her dog. For POTS, her cardiologist is recommending leg work outs. Objective: See treatment diary below      Assessment: Patient is 25 weeks pregnant. Educated patient on sleeping position to improve her neck position which likely is contributing to headaches. Added wall lift offs and progressed no money exercise to improve cervical postural strength that may be contributing to cervicogenic headaches. Added LE strengthening to help with her POTS and may help her be more active for her low back pain. Reinforced recommendation for belly band which can decrease stress on her lumbar. Recommend focusing on abdominal, hip, and upper and lower back strengthening to improve her pregnancy related back and neck pain. Plan: Continue per plan of care.       Diagnosis: Pregnancy related back pain and neck pain   Precautions: pregnant, POTS, endometriosis    10/31 11/09 11/16 11/28 12/5      Vitals 122/84  HR 85   SpO2 97 *constipation management  *abdominal/hip/back strengthening         Patient Ed           Constipation management Increase water intake  Stool softener  Walking for activity Constipation recipe         Sleeping Do not have to solely sleep on L side    Pillows (2 neck + Zaida pillow), 1 under arm, 1 between legs      Belly band     Recommend                                                  Neuro Re-Ed           Hug the baby 10x 10x especially with mat transfers                                                                           Ther Ex           Aerobic conditioning   L5 8' L5 8'       Cat-cow 10x 10x 10x 10 x  mod standing still P! Thread the needle   5x 5 x        Prayer stretch           Supine piriformis stretch Figure 4  3 x 10s  3x10" 3 x 10'' mod seated p!  Standing performed        Suboccipital stretch Supine chin tuck with towel under mastoids  3 x 10s 3x10         No Money GTB  3 x 10 GTB 3x10   Blue band distributed      Wall lift     3 x 10      Pball   CW, CCW, stretches, pelvic tilts multifidus rotation YCO (20 of each) CW, CCW, stretches, pelvic tilts multifidus rotation YCO (20 of each)       Bridges     2 x 10      Clams     GTB  3 x 10      Sit to stands     2 x 15      Calf raise     2 x 15-20 b/l      Quadruped     Alt arm raises  2 x 10 ea    Alt leg raise  6x ea                 Ther Activity                                 Manual           Suboccipital release 5' 10' 15' 15'       L/s paraspinals pregnancy pillow  10'                                          Modalities

## 2023-12-05 ENCOUNTER — OFFICE VISIT (OUTPATIENT)
Dept: PHYSICAL THERAPY | Facility: REHABILITATION | Age: 28
End: 2023-12-05
Payer: COMMERCIAL

## 2023-12-05 DIAGNOSIS — O99.891 BACK PAIN AFFECTING PREGNANCY IN SECOND TRIMESTER: Primary | ICD-10-CM

## 2023-12-05 DIAGNOSIS — M54.9 BACK PAIN AFFECTING PREGNANCY IN SECOND TRIMESTER: Primary | ICD-10-CM

## 2023-12-05 PROCEDURE — 97530 THERAPEUTIC ACTIVITIES: CPT | Performed by: PHYSICAL THERAPIST

## 2023-12-05 PROCEDURE — 97110 THERAPEUTIC EXERCISES: CPT | Performed by: PHYSICAL THERAPIST

## 2023-12-11 ENCOUNTER — TELEPHONE (OUTPATIENT)
Dept: OBGYN CLINIC | Facility: CLINIC | Age: 28
End: 2023-12-11

## 2023-12-11 ENCOUNTER — CLINICAL SUPPORT (OUTPATIENT)
Age: 28
End: 2023-12-11

## 2023-12-11 DIAGNOSIS — M54.50 ACUTE MIDLINE LOW BACK PAIN WITHOUT SCIATICA: ICD-10-CM

## 2023-12-11 DIAGNOSIS — Z32.2 ENCOUNTER FOR CHILDBIRTH INSTRUCTION: Primary | ICD-10-CM

## 2023-12-11 DIAGNOSIS — G56.00 CARPAL TUNNEL SYNDROME DURING PREGNANCY: Primary | ICD-10-CM

## 2023-12-11 DIAGNOSIS — M25.532 LEFT WRIST PAIN: ICD-10-CM

## 2023-12-11 DIAGNOSIS — M25.532 LEFT WRIST PAIN: Primary | ICD-10-CM

## 2023-12-11 DIAGNOSIS — Z3A.26 26 WEEKS GESTATION OF PREGNANCY: ICD-10-CM

## 2023-12-11 DIAGNOSIS — O26.899 CARPAL TUNNEL SYNDROME DURING PREGNANCY: Primary | ICD-10-CM

## 2023-12-11 NOTE — PROGRESS NOTES
Daily Note     Today's date: 2023  Patient name: Elizabeth Bear  : 1995  MRN: [id number]  Referring provider: Shelly Forrest,*  Dx:   No diagnosis found.                   Subjective: Using stool softener daily -- helps bowel frequency but still not great. Using Mg for restless legs, constipation, and headaches. Back pain is ok, notices that she has to get up often. Able to walk longer distances, with her dog. For POTS, her cardiologist is recommending leg work outs.       Objective: See treatment diary below      Assessment: Patient is 25 weeks pregnant. Educated patient on sleeping position to improve her neck position which likely is contributing to headaches. Added wall lift offs and progressed no money exercise to improve cervical postural strength that may be contributing to cervicogenic headaches. Added LE strengthening to help with her POTS and may help her be more active for her low back pain. Reinforced recommendation for belly band which can decrease stress on her lumbar. Recommend focusing on abdominal, hip, and upper and lower back strengthening to improve her pregnancy related back and neck pain.       Plan: Continue per plan of care.      Diagnosis: Pregnancy related back pain and neck pain   Precautions: pregnant, POTS, endometriosis    10/31 11/09 11/16 11/28 12/5      Vitals 122/84  HR 85   SpO2 97 *constipation management  *abdominal/hip/back strengthening         Patient Ed           Constipation management Increase water intake  Stool softener  Walking for activity Constipation recipe         Sleeping Do not have to solely sleep on L side    Pillows (2 neck + Zaida pillow), 1 under arm, 1 between legs      Belly band     Recommend                                                  Neuro Re-Ed           Hug the baby 10x 10x especially with mat transfers                                                                           Ther Ex           Aerobic conditioning   L5  "8' L5 8'       Cat-cow 10x 10x 10x 10 x  mod standing still P!        Thread the needle   5x 5 x        Prayer stretch           Supine piriformis stretch Figure 4  3 x 10s  3x10\" 3 x 10'' mod seated p! Standing performed        Suboccipital stretch Supine chin tuck with towel under mastoids  3 x 10s 3x10         No Money GTB  3 x 10 GTB 3x10   Blue band distributed      Wall lift     3 x 10      Pball   CW, CCW, stretches, pelvic tilts multifidus rotation YCO (20 of each) CW, CCW, stretches, pelvic tilts multifidus rotation YCO (20 of each)       Bridges     2 x 10      Clams     GTB  3 x 10      Sit to stands     2 x 15      Calf raise     2 x 15-20 b/l      Quadruped     Alt arm raises  2 x 10 ea    Alt leg raise  6x ea                 Ther Activity                                 Manual           Suboccipital release 5' 10' 15' 15'       L/s paraspinals pregnancy pillow  10'                                          Modalities                                             "

## 2023-12-12 ENCOUNTER — APPOINTMENT (OUTPATIENT)
Dept: PHYSICAL THERAPY | Facility: REHABILITATION | Age: 28
End: 2023-12-12
Payer: COMMERCIAL

## 2023-12-13 ENCOUNTER — TELEPHONE (OUTPATIENT)
Dept: OBGYN CLINIC | Facility: CLINIC | Age: 28
End: 2023-12-13

## 2023-12-14 ENCOUNTER — OFFICE VISIT (OUTPATIENT)
Dept: PHYSICAL THERAPY | Facility: REHABILITATION | Age: 28
End: 2023-12-14
Payer: COMMERCIAL

## 2023-12-14 ENCOUNTER — TELEPHONE (OUTPATIENT)
Dept: CARDIOLOGY CLINIC | Facility: CLINIC | Age: 28
End: 2023-12-14

## 2023-12-14 DIAGNOSIS — M54.9 BACK PAIN AFFECTING PREGNANCY IN SECOND TRIMESTER: Primary | ICD-10-CM

## 2023-12-14 DIAGNOSIS — O99.891 BACK PAIN AFFECTING PREGNANCY IN SECOND TRIMESTER: Primary | ICD-10-CM

## 2023-12-14 DIAGNOSIS — G90.A POTS (POSTURAL ORTHOSTATIC TACHYCARDIA SYNDROME): Primary | ICD-10-CM

## 2023-12-14 PROCEDURE — 97140 MANUAL THERAPY 1/> REGIONS: CPT

## 2023-12-14 PROCEDURE — 97530 THERAPEUTIC ACTIVITIES: CPT

## 2023-12-14 PROCEDURE — 97110 THERAPEUTIC EXERCISES: CPT

## 2023-12-14 NOTE — PROGRESS NOTES
Daily Note     Today's date: 2023  Patient name: Reyna Hansen  : 1995  MRN: [id number]  Referring provider: Tato Stiles,*  Dx:   Encounter Diagnosis     ICD-10-CM    1. Back pain affecting pregnancy in second trimester  O99.891     M54.9               Start Time: 1645  Stop Time: 0853  Total time in clinic (min): 45 minutes    Subjective: Patient reports she was diagnosed with carpal tunnel due to pregnancy. End of last week it started that progressed over the weekend, she was given wrist brace for use at night; however she uses it during the day intermittently to help relieve symptoms. Also states her swelling has increased and she is wearing her compression socks to help relieve the symptoms, reports getting dizzy at times due to POTS and the socks have been helping. In addition she notes she is not as constipated as previous, but still continues to have hard poop vs soft. Patient had her family over for vacation and was unable to get to her exercises this week. In another occurrence her Left shoulder has been causing some dislocation feeling symptoms where she feels like she needs to crack it and put it back into place. Patient states the belly band she ordered was delivered today and is excited to use it. Notes you can order them from store [employer]. Objective: See treatment diary below      Assessment: Patient is 26 and days weeks pregnant and tolerated session well despite new diagnosis of carpal tunnel. Patient donned compression socks for swelling and to help with POTS. Patient had some difficulty with Q ped due to carpal tunnel. Patient responded well to manuals to help relive neck symptoms. Patient would benefit from further PT. Plan: Continue per plan of care.       Diagnosis: Pregnancy related back pain and neck pain   Precautions: pregnant, POTS, endometriosis    10/31 11/09 11/16 11/28 12/5 12/14     Vitals 122/84  HR 85   SpO2 97 *constipation management  *abdominal/hip/back strengthening         Patient Ed           Constipation management Increase water intake  Stool softener  Walking for activity Constipation recipe         Sleeping Do not have to solely sleep on L side    Pillows (2 neck + Zaida pillow), 1 under arm, 1 between legs      Belly band     Recommend                                                  Neuro Re-Ed           Hug the baby 10x 10x especially with mat transfers                                                                           Ther Ex           Aerobic conditioning   L5 8' L5 8'  L5 8'     Cat-cow 10x 10x 10x 10 x  mod standing still P! Thread the needle   5x 5 x        Prayer stretch           Supine piriformis stretch Figure 4  3 x 10s  3x10" 3 x 10'' mod seated p!  Standing performed        Suboccipital stretch Supine chin tuck with towel under mastoids  3 x 10s 3x10         No Money GTB  3 x 10 GTB 3x10   Blue band distributed      Wall lift     3 x 10 3 x 10     Pball   CW, CCW, stretches, pelvic tilts multifidus rotation YCO (20 of each) CW, CCW, stretches, pelvic tilts multifidus rotation YCO (20 of each)  CW, CCW, stretches, pelvic tilts     multifidus rotation YCO (20 of each)     Bridges     2 x 10 2 x 10     Clams     GTB  3 x 10 GTB  3 x 10     Sit to stands     2 x 15 2 x 15     Calf raise     2 x 15-20 b/l 2 x 15-20 b/l     Quadruped     Alt arm raises  2 x 10 ea    Alt leg raise  6x ea Alt arm raises  2 x 10 ea    Alt leg raise  6x ea                Ther Activity                                 Manual           Suboccipital release 5' 10' 15' 15'  15'     L/s paraspinals pregnancy pillow  10'                                          Modalities

## 2023-12-14 NOTE — TELEPHONE ENCOUNTER
----- Message from Giuliano Vargas MD sent at 12/14/2023 11:59 AM EST -----  Regarding: FW: Compression stockings  Contact: [phone]  Please let her know I have prescribed 3 more pairs, unfortunately there is no way to put refills on them but she can call again in 3 months if she wants more  ----- Message -----  From: Naveed Davila  Sent: 12/13/2023  10:20 AM EST  To: Giuliano Vargas MD  Subject: FW: Compression stockings                          ----- Message -----  From: Stephen Sawyer  Sent: 12/13/2023   8:40 AM EST  To: Cardiology [facility]  Subject: Compression stockings                            Hello,    I went to  my compression stockings prescribed at my last visit and they are working wonderfully. I was told when I picked them up that I should get 2 more pairs because insurance covers up to 3 every 6 months and it would make it easier with laundry cycles as well as making sure they won't get worn out as quick. Would you be willing to write me a new script for 2 additional stockings? Thank you!   Maira San

## 2023-12-16 ENCOUNTER — APPOINTMENT (OUTPATIENT)
Dept: LAB | Age: 28
End: 2023-12-16
Payer: COMMERCIAL

## 2023-12-16 DIAGNOSIS — Z34.82 ENCOUNTER FOR SUPERVISION OF OTHER NORMAL PREGNANCY IN SECOND TRIMESTER: ICD-10-CM

## 2023-12-16 LAB
ALBUMIN SERPL BCP-MCNC: 3.7 G/DL (ref 3.5–5)
ALP SERPL-CCNC: 78 U/L (ref 34–104)
ALT SERPL W P-5'-P-CCNC: 27 U/L (ref 7–52)
ANION GAP SERPL CALCULATED.3IONS-SCNC: 9 MMOL/L
AST SERPL W P-5'-P-CCNC: 23 U/L (ref 13–39)
BASOPHILS # BLD AUTO: 0.03 THOUSANDS/ÂΜL (ref 0–0.1)
BASOPHILS NFR BLD AUTO: 0 % (ref 0–1)
BILIRUB SERPL-MCNC: 0.27 MG/DL (ref 0.2–1)
BUN SERPL-MCNC: 12 MG/DL (ref 5–25)
CALCIUM SERPL-MCNC: 10.3 MG/DL (ref 8.4–10.2)
CHLORIDE SERPL-SCNC: 100 MMOL/L (ref 96–108)
CO2 SERPL-SCNC: 25 MMOL/L (ref 21–32)
CREAT SERPL-MCNC: 0.54 MG/DL (ref 0.6–1.3)
CREAT UR-MCNC: 46.7 MG/DL
EOSINOPHIL # BLD AUTO: 0.12 THOUSAND/ÂΜL (ref 0–0.61)
EOSINOPHIL NFR BLD AUTO: 1 % (ref 0–6)
ERYTHROCYTE [DISTWIDTH] IN BLOOD BY AUTOMATED COUNT: 13.2 % (ref 11.6–15.1)
GFR SERPL CREATININE-BSD FRML MDRD: 128 ML/MIN/1.73SQ M
GLUCOSE 1H P 50 G GLC PO SERPL-MCNC: 173 MG/DL (ref 40–134)
GLUCOSE SERPL-MCNC: 170 MG/DL (ref 65–140)
HCT VFR BLD AUTO: 33.5 % (ref 34.8–46.1)
HGB BLD-MCNC: 11.4 G/DL (ref 11.5–15.4)
IMM GRANULOCYTES # BLD AUTO: 0.08 THOUSAND/UL (ref 0–0.2)
IMM GRANULOCYTES NFR BLD AUTO: 1 % (ref 0–2)
LYMPHOCYTES # BLD AUTO: 1.55 THOUSANDS/ÂΜL (ref 0.6–4.47)
LYMPHOCYTES NFR BLD AUTO: 12 % (ref 14–44)
MCH RBC QN AUTO: 31.8 PG (ref 26.8–34.3)
MCHC RBC AUTO-ENTMCNC: 34 G/DL (ref 31.4–37.4)
MCV RBC AUTO: 93 FL (ref 82–98)
MONOCYTES # BLD AUTO: 0.44 THOUSAND/ÂΜL (ref 0.17–1.22)
MONOCYTES NFR BLD AUTO: 3 % (ref 4–12)
NEUTROPHILS # BLD AUTO: 10.65 THOUSANDS/ÂΜL (ref 1.85–7.62)
NEUTS SEG NFR BLD AUTO: 83 % (ref 43–75)
NRBC BLD AUTO-RTO: 0 /100 WBCS
PLATELET # BLD AUTO: 327 THOUSANDS/UL (ref 149–390)
PMV BLD AUTO: 9.9 FL (ref 8.9–12.7)
POTASSIUM SERPL-SCNC: 3.8 MMOL/L (ref 3.5–5.3)
PROT SERPL-MCNC: 6.7 G/DL (ref 6.4–8.4)
PROT UR-MCNC: 8 MG/DL
PROT/CREAT UR: 0.17 MG/G{CREAT} (ref 0–0.1)
RBC # BLD AUTO: 3.59 MILLION/UL (ref 3.81–5.12)
SODIUM SERPL-SCNC: 134 MMOL/L (ref 135–147)
WBC # BLD AUTO: 12.87 THOUSAND/UL (ref 4.31–10.16)

## 2023-12-16 PROCEDURE — 84156 ASSAY OF PROTEIN URINE: CPT

## 2023-12-16 PROCEDURE — 82950 GLUCOSE TEST: CPT

## 2023-12-16 PROCEDURE — 82570 ASSAY OF URINE CREATININE: CPT

## 2023-12-16 PROCEDURE — 80053 COMPREHEN METABOLIC PANEL: CPT

## 2023-12-16 PROCEDURE — 85025 COMPLETE CBC W/AUTO DIFF WBC: CPT

## 2023-12-16 PROCEDURE — 36415 COLL VENOUS BLD VENIPUNCTURE: CPT

## 2023-12-16 PROCEDURE — 86780 TREPONEMA PALLIDUM: CPT

## 2023-12-17 ENCOUNTER — NURSE TRIAGE (OUTPATIENT)
Dept: OTHER | Facility: OTHER | Age: 28
End: 2023-12-17

## 2023-12-17 ENCOUNTER — HOSPITAL ENCOUNTER (OUTPATIENT)
Facility: HOSPITAL | Age: 28
Discharge: HOME | End: 2023-12-17
Attending: STUDENT IN AN ORGANIZED HEALTH CARE EDUCATION/TRAINING PROGRAM | Admitting: STUDENT IN AN ORGANIZED HEALTH CARE EDUCATION/TRAINING PROGRAM
Payer: COMMERCIAL

## 2023-12-17 VITALS
TEMPERATURE: 98.4 F | DIASTOLIC BLOOD PRESSURE: 71 MMHG | SYSTOLIC BLOOD PRESSURE: 129 MMHG | RESPIRATION RATE: 18 BRPM | HEART RATE: 99 BPM

## 2023-12-17 DIAGNOSIS — O99.810 ABNORMAL GLUCOSE IN PREGNANCY, ANTEPARTUM: Primary | ICD-10-CM

## 2023-12-17 LAB
ATRIAL RATE: 88 BPM
BILIRUB UR QL STRIP: NEGATIVE
CLARITY UR: CLEAR
COLOR UR: YELLOW
GLUCOSE UR STRIP-MCNC: NEGATIVE MG/DL
HGB UR QL STRIP.AUTO: NEGATIVE
KETONES UR STRIP-MCNC: NEGATIVE MG/DL
LEUKOCYTE ESTERASE UR QL STRIP: NEGATIVE
NITRITE UR QL STRIP: NEGATIVE
P AXIS: 40 DEGREES
PH UR STRIP.AUTO: 8.5 [PH] (ref 4.5–8)
PR INTERVAL: 144 MS
PROT UR STRIP-MCNC: NEGATIVE MG/DL
QRS AXIS: 54 DEGREES
QRSD INTERVAL: 86 MS
QT INTERVAL: 356 MS
QTC INTERVAL: 430 MS
SP GR UR STRIP.AUTO: 1.02 (ref 1–1.03)
T WAVE AXIS: 46 DEGREES
TREPONEMA PALLIDUM IGG+IGM AB [PRESENCE] IN SERUM OR PLASMA BY IMMUNOASSAY: NORMAL
UROBILINOGEN UR QL STRIP.AUTO: 0.2 E.U./DL
VENTRICULAR RATE: 88 BPM

## 2023-12-17 PROCEDURE — 99213 OFFICE O/P EST LOW 20 MIN: CPT | Performed by: STUDENT IN AN ORGANIZED HEALTH CARE EDUCATION/TRAINING PROGRAM

## 2023-12-17 PROCEDURE — 99214 OFFICE O/P EST MOD 30 MIN: CPT

## 2023-12-17 PROCEDURE — 76817 TRANSVAGINAL US OBSTETRIC: CPT

## 2023-12-17 PROCEDURE — NC001 PR NO CHARGE: Performed by: STUDENT IN AN ORGANIZED HEALTH CARE EDUCATION/TRAINING PROGRAM

## 2023-12-17 PROCEDURE — 76815 OB US LIMITED FETUS(S): CPT

## 2023-12-17 PROCEDURE — 81003 URINALYSIS AUTO W/O SCOPE: CPT

## 2023-12-17 PROCEDURE — 93005 ELECTROCARDIOGRAM TRACING: CPT

## 2023-12-17 RX ADMIN — SODIUM CHLORIDE, SODIUM LACTATE, POTASSIUM CHLORIDE, AND CALCIUM CHLORIDE 1000 ML: .6; .31; .03; .02 INJECTION, SOLUTION INTRAVENOUS at 11:35

## 2023-12-17 NOTE — PROCEDURES
Elizabeth Bear, a  at 26w6d with an KWAME of 3/18/2024, by Last Menstrual Period, was seen at [facility] for the following procedure(s): $Procedure Type: EDOUARD, US - Transvaginal]         4 Quadrant EDOUARD  EDOUARD Q1 (cm): 1.9 cm  EDOUARD Q2 (cm): 3.7 cm  EDOUARD Q3 (cm): 1.6 cm  EDOUARD Q4 (cm): 3.7 cm  EDOUARD TOTAL (cm): 10.9 cm         Ultrasound Other  Cervical Length: 3.88         Ultrasound Probe Disinfection    A transvaginal ultrasound was performed.   Prior to use, disinfection was performed with High Level Disinfection Process (Trophon)      Serenity Rodriguez MD  23  11:45 AM

## 2023-12-17 NOTE — TELEPHONE ENCOUNTER
Patient calling in this AM reporting she has been having decreased fetal movement since yesterday. Stated she only felt the baby move about x4 yesterday and has only felt small flutters so far today. Patient stated this is unsure for her, that she normally feels more movements. Patient stated she is also having some mild period like abdominal cramps that are a 2-3/10, stated the cramps started yesterday as well. Patient stated she has also been feeling dizzy, flushed and her resting heart rate has been in the 120's. Patient stated she does have POTS and is unsure if the symptoms are related to that vs pregnancy vs anxiety. On call provider contacted, stated patient should go to the [facility] L&D triage at this time for evaluation. Patient made aware and verbalized understanding.     Reason for Disposition   [1] Pregnant 23 or more weeks AND [2] baby moving less today by kick count  (e.g., kick count < 5 in 1 hour or < 10 in 2 hours)    Protocols used: Pregnancy - Decreased Fetal Movement-ADULT-

## 2023-12-17 NOTE — TELEPHONE ENCOUNTER
"Regardin weeks pregnant/ dizzy/ elevated heart rate  ----- Message from Sunita Henriquez sent at 2023  8:40 AM EST -----  \"I am 27 weeks. I failed my glucose test. I have been feeling dizzy, out of breath and my heart rate has been elevated.    "

## 2023-12-17 NOTE — PROGRESS NOTES
L&D Triage Note - OB/GYN  Elizabeth Bear 28 y.o. female MRN: [id number]  Unit/Bed#:  TRIAGE 2 Encounter: [id number]      ASSESSMENT:    Elizabeth Bear is a 28 y.o.  at 26w6d presenting to triage for DFM, dizziness, shortness of breath, and tachycardia. Symptoms improved s/p IV fluids. Appropriate for discharge    PLAN:    1) DFM  -Feeling increased movement in triage visualized on TAUS  -NST reactive  -EDOUARD 10.9cm  2) Tachycardia  -HR 99 in triage  -EKG NSR  3) Dizzy  -Improved since yesterday  -Improved s/p IV fluids  4) Pelvic cramping  -R/o PTL neg  -Neg wet mount  -Neg urine dip  -NST reactive  5) Failed 1hr glucola  -Patient does not want to do 3hr glucola  -Discussed home POC sugars for a week, to be discussed with primary OB  6) Continue routine prenatal care  7) Discharge from OB triage with  labor precautions    - Reviewed rupture of membranes, false vs true labor, decreased fetal movement, and vaginal bleeding   - Pt to call provider with any concerns and follow up at her next scheduled prenatal appointment    - Case discussed with Dr. Fontaine    SUBJECTIVE:    Elizabeth Bear 28 y.o.  at 26w6d with an Estimated Date of Delivery: 3/18/24 presenting to triage with complaint of decreased fetal movement because of dizziness, tachycardia that the patient noted on her Apple Watch.  The symptoms all started after the patient fasted 2 nights ago for her 1 hour Glucola that she failed yesterday.  Since the Glucola as she acquired all of the above symptoms.  She has been trying to hydrate orally and eat but the mild dizziness has persisted.  The patient and her  wonder if she can avoid doing the 3-hour Glucola.  Discussed that she could potentially do fasting blood sugar checks at home for a week if she wishes to avoid the 3-hour Glucola.    The patient states that she also has new pelvic cramping.  Denies vaginal bleeding, vaginal fluid, feels normal fetal  movement    OBJECTIVE:    Vitals:    12/17/23 1025   BP: 129/71   Pulse: 99   Resp: 18   Temp: 98.4 °F (36.9 °C)       ROS:  Constitutional: Dizziness  Respiratory: Negative  Cardiovascular: Negative    Gastrointestinal: Negative    General Physical Exam:  General: in no apparent distress  Cardiovascular: No murmurs  Lungs: non-labored breathing  Abdomen: abdomen is soft without significant tenderness, masses, organomegaly or guarding  Lower extremeties: nontender    Cervical Exam  Speculum: Cervical os is closed, presence of discharge, absence of bleeding, absence of lesions    SVE:   Cervical Dilation: Closed  Cervical Effacement: 0  Fetal Station: -3  Method: Manual  OB Examiner: Jennifer    FHT:  Baseline Rate (FHR): 140 bpm  Variability: Moderate  Accelerations: 10 x 10 (<32 weeks), At variable times  Decelerations: None    TOCO:   Contraction Frequency (minutes): 0    Lab Results   Component Value Date    WBC 12.87 (H) 12/16/2023    HGB 11.4 (L) 12/16/2023    HCT 33.5 (L) 12/16/2023     12/16/2023     Lab Results   Component Value Date    K 3.8 12/16/2023     12/16/2023    CO2 25 12/16/2023    BUN 12 12/16/2023    CREATININE 0.54 (L) 12/16/2023    AST 23 12/16/2023    ALT 27 12/16/2023       KOH/WTMT:     Infection:   - few clue cells    - neg hyphae   - neg trichomonads present    Membrane status   - no ferning   - neg nitrazene   - no pooling     Urine Dip    - neg nitrites, leukocytes    Imaging:         TVUS   - Cervical length    - 4.26cm    - 3.88cm    - 3.92cm          Abd. US   4 Quadrant EDOUARD  EDOUARD Q1 (cm): 1.9 cm  EDOUARD Q2 (cm): 3.7 cm  EDOUARD Q3 (cm): 1.6 cm  EDOUARD Q4 (cm): 3.7 cm  EDOUARD TOTAL (cm): 10.9 cm    Serenity Rodriguez MD,  OBGYN PGY-2  12/17/2023 11:44 AM

## 2023-12-17 NOTE — TELEPHONE ENCOUNTER
"  Answer Assessment - Initial Assessment Questions  1. FETAL MOVEMENT: \"Has the baby's movement decreased or changed significantly from normal?\" (e.g., yes, no; describe)      Decreased movement started yesterday- felt about 4  movements all day yesterday and only small flutters so far today.    2. KWAME: \"What date are you expecting to deliver?\"       3/18/24    3. PREGNANCY: \"How many weeks pregnant are you?\"       26w6d    4. OTHER SYMPTOMS: \"Do you have any other symptoms?\" (e.g., abdominal pain, leaking fluid from vagina, vaginal bleeding, etc.)      Dizzy, flushed, rest heart rate around 120's- has POTS       Denies any leakage of fluid or bleeding       Has been having some abdominal cramping that started yesterday- like mild period cramps 2-3/10    Protocols used: Pregnancy - Decreased Fetal Movement-ADULT-AH    "

## 2023-12-18 NOTE — PROGRESS NOTES
Daily Note     Today's date: 2023  Patient name: Elizabeth Bear  : 1995  MRN: [id number]  Referring provider: Shelly Forrest,*  Dx:   No diagnosis found.                     Subjective: Patient reports she was diagnosed with carpal tunnel due to pregnancy. End of last week it started that progressed over the weekend, she was given wrist brace for use at night; however she uses it during the day intermittently to help relieve symptoms.  Also states her swelling has increased and she is wearing her compression socks to help relieve the symptoms, reports getting dizzy at times due to POTS and the socks have been helping. In addition she notes she is not as constipated as previous, but still continues to have hard poop vs soft.  Patient had her family over for vacation and was unable to get to her exercises this week.  In another occurrence her Left shoulder has been causing some dislocation feeling symptoms where she feels like she needs to crack it and put it back into place.  Patient states the belly band she ordered was delivered today and is excited to use it.  Notes you can order them from store pump.       Objective: See treatment diary below      Assessment: Patient is 26 and days weeks pregnant and tolerated session well despite new diagnosis of carpal tunnel. Patient donned compression socks for swelling and to help with POTS. Patient had some difficulty with Q ped due to carpal tunnel. Patient responded well to manuals to help relive neck symptoms. Patient would benefit from further PT.       Plan: Continue per plan of care.      Diagnosis: Pregnancy related back pain and neck pain   Precautions: pregnant, POTS, endometriosis    10/31 11/09 11/16 11/28 12/5 12/14     Vitals 122/84  HR 85   SpO2 97 *constipation management  *abdominal/hip/back strengthening         Patient Ed           Constipation management Increase water intake  Stool softener  Walking for activity Constipation  "recipe         Sleeping Do not have to solely sleep on L side    Pillows (2 neck + Zaida pillow), 1 under arm, 1 between legs      Belly band     Recommend                                                  Neuro Re-Ed           Hug the baby 10x 10x especially with mat transfers                                                                           Ther Ex           Aerobic conditioning   L5 8' L5 8'  L5 8'     Cat-cow 10x 10x 10x 10 x  mod standing still P!        Thread the needle   5x 5 x        Prayer stretch           Supine piriformis stretch Figure 4  3 x 10s  3x10\" 3 x 10'' mod seated p! Standing performed        Suboccipital stretch Supine chin tuck with towel under mastoids  3 x 10s 3x10         No Money GTB  3 x 10 GTB 3x10   Blue band distributed      Wall lift     3 x 10 3 x 10     Pball   CW, CCW, stretches, pelvic tilts multifidus rotation YCO (20 of each) CW, CCW, stretches, pelvic tilts multifidus rotation YCO (20 of each)  CW, CCW, stretches, pelvic tilts     multifidus rotation YCO (20 of each)     Bridges     2 x 10 2 x 10     Clams     GTB  3 x 10 GTB  3 x 10     Sit to stands     2 x 15 2 x 15     Calf raise     2 x 15-20 b/l 2 x 15-20 b/l     Quadruped     Alt arm raises  2 x 10 ea    Alt leg raise  6x ea Alt arm raises  2 x 10 ea    Alt leg raise  6x ea                Ther Activity                                 Manual           Suboccipital release 5' 10' 15' 15'  15'     L/s paraspinals pregnancy pillow  10'                                          Modalities                                             "

## 2023-12-19 ENCOUNTER — APPOINTMENT (OUTPATIENT)
Dept: PHYSICAL THERAPY | Facility: REHABILITATION | Age: 28
End: 2023-12-19
Payer: COMMERCIAL

## 2023-12-26 ENCOUNTER — OFFICE VISIT (OUTPATIENT)
Dept: PHYSICAL THERAPY | Facility: REHABILITATION | Age: 28
End: 2023-12-26
Payer: COMMERCIAL

## 2023-12-26 DIAGNOSIS — O99.891 BACK PAIN AFFECTING PREGNANCY IN SECOND TRIMESTER: Primary | ICD-10-CM

## 2023-12-26 DIAGNOSIS — M54.9 BACK PAIN AFFECTING PREGNANCY IN SECOND TRIMESTER: Primary | ICD-10-CM

## 2023-12-26 PROCEDURE — 97110 THERAPEUTIC EXERCISES: CPT | Performed by: PHYSICAL THERAPIST

## 2023-12-26 NOTE — PROGRESS NOTES
Daily Note     Today's date: 2023  Patient name: Elizabeth Bear  : 1995  MRN: [id number]  Referring provider: Shelly Forrest,*  Dx:   Encounter Diagnosis     ICD-10-CM    1. Back pain affecting pregnancy in second trimester  O99.891     M54.9                 Start Time: 1630  Stop Time: 1715  Total time in clinic (min): 45 minutes    Subjective: Starting to swell below her knee now. She is told to use compression socks but it is difficult to put on. She is exhausted. Carpal tunnel making it difficult to do quadruped exercises. Wearing splints at night only now as pain is better unless she is lifting things POTS - been dizzy and SOB. Trying to stay hydrated. She had glucose test done and failed it. She is going to monitor her glucose and discuss if she needs more testing tomorrow at her OB appt. Her back hasn't been bad. Neck pain getting better, thinks the magnesium is helping.      Objective: See treatment diary below      Assessment: Patient is 28 weeks pregnant. Modified exercises to avoid stress her carpal tunnel. Patient able to complete posterior chain strengthening with counter support instead. Added more weight bearing exercises address her POTS and back/hip pain. Will discuss labor prep and continue strengthening as tolerated to improve her comfort through her pregnancy.        Plan: Continue per plan of care.      Diagnosis: Pregnancy related back pain and neck pain, KWAME: 3/18/24   Precautions: pregnant, POTS, endometriosis    10/31 11/09 11/16 11/28 12/5 12/14 12/26    Vitals 122/84  HR 85   SpO2 97 *constipation management  *abdominal/hip/back strengthening      *Labor prep   Patient Ed           Constipation management Increase water intake  Stool softener  Walking for activity Constipation recipe         Sleeping Do not have to solely sleep on L side    Pillows (2 neck + Zaida pillow), 1 under arm, 1 between legs      Belly band     Recommend                                 "                  Neuro Re-Ed           Hug the baby 10x 10x especially with mat transfers                                                                           Ther Ex           Aerobic conditioning   L5 8' L5 8'  L5 8' Nustep  L5 8'    Cat-cow 10x 10x 10x 10 x  mod standing still P!        Thread the needle   5x 5 x        Prayer stretch           Supine piriformis stretch Figure 4  3 x 10s  3x10\" 3 x 10'' mod seated p! Standing performed        Suboccipital stretch Supine chin tuck with towel under mastoids  3 x 10s 3x10         No Money GTB  3 x 10 GTB 3x10   Blue band distributed  Rows  Magenta  2 x 10    Wall lift     3 x 10 3 x 10 3 x 15    Pball   CW, CCW, stretches, pelvic tilts multifidus rotation YCO (20 of each) CW, CCW, stretches, pelvic tilts multifidus rotation YCO (20 of each)  CW, CCW, stretches, pelvic tilts     multifidus rotation YCO (20 of each)     Bridges     2 x 10 2 x 10 HEP    Clams     GTB  3 x 10 GTB  3 x 10 HEP    Sit to stands     2 x 15 2 x 15 Use UE for support as needed    Side steps       [place] TB at thigh and ankles  3 laps x 12ft    Calf raise     2 x 15-20 b/l 2 x 15-20 b/l HEP    Quadruped     Alt arm raises  2 x 10 ea    Alt leg raise  6x ea Alt arm raises  2 x 10 ea    Alt leg raise  6x ea Modify to counter support    Alt Leg raise    Incline push up    Step ups       6\"  3 x 30s               Ther Activity                                 Manual           Suboccipital release 5' 10' 15' 15'  15'     L/s paraspinals pregnancy pillow  10'                                          Modalities                                             "

## 2023-12-27 ENCOUNTER — ROUTINE PRENATAL (OUTPATIENT)
Dept: OBGYN CLINIC | Facility: CLINIC | Age: 28
End: 2023-12-27
Payer: COMMERCIAL

## 2023-12-27 VITALS
BODY MASS INDEX: 40.34 KG/M2 | WEIGHT: 251.8 LBS | SYSTOLIC BLOOD PRESSURE: 122 MMHG | HEART RATE: 110 BPM | DIASTOLIC BLOOD PRESSURE: 76 MMHG

## 2023-12-27 DIAGNOSIS — Z23 NEED FOR TDAP VACCINATION: ICD-10-CM

## 2023-12-27 DIAGNOSIS — O99.213 OBESITY AFFECTING PREGNANCY IN THIRD TRIMESTER, UNSPECIFIED OBESITY TYPE: ICD-10-CM

## 2023-12-27 DIAGNOSIS — O26.892 RH NEGATIVE STATE IN ANTEPARTUM PERIOD, SECOND TRIMESTER: ICD-10-CM

## 2023-12-27 DIAGNOSIS — Z3A.28 28 WEEKS GESTATION OF PREGNANCY: ICD-10-CM

## 2023-12-27 DIAGNOSIS — Z29.13 NEED FOR RHOGAM DUE TO RH NEGATIVE MOTHER: ICD-10-CM

## 2023-12-27 DIAGNOSIS — Z34.03 PRENATAL CARE, FIRST PREGNANCY, THIRD TRIMESTER: Primary | ICD-10-CM

## 2023-12-27 DIAGNOSIS — Z67.91 RH NEGATIVE STATE IN ANTEPARTUM PERIOD, SECOND TRIMESTER: ICD-10-CM

## 2023-12-27 DIAGNOSIS — O99.810 ABNORMAL GLUCOSE AFFECTING PREGNANCY: ICD-10-CM

## 2023-12-27 PROBLEM — Z3A.12 12 WEEKS GESTATION OF PREGNANCY: Status: RESOLVED | Noted: 2023-11-30 | Resolved: 2023-12-27

## 2023-12-27 LAB
SL AMB  POCT GLUCOSE, UA: NORMAL
SL AMB POCT URINE PROTEIN: NORMAL

## 2023-12-27 PROCEDURE — 96372 THER/PROPH/DIAG INJ SC/IM: CPT

## 2023-12-27 PROCEDURE — 90715 TDAP VACCINE 7 YRS/> IM: CPT

## 2023-12-27 PROCEDURE — 81002 URINALYSIS NONAUTO W/O SCOPE: CPT | Performed by: STUDENT IN AN ORGANIZED HEALTH CARE EDUCATION/TRAINING PROGRAM

## 2023-12-27 PROCEDURE — 90471 IMMUNIZATION ADMIN: CPT

## 2023-12-27 PROCEDURE — PNV: Performed by: STUDENT IN AN ORGANIZED HEALTH CARE EDUCATION/TRAINING PROGRAM

## 2023-12-27 NOTE — ASSESSMENT & PLAN NOTE
29yo  at 28.2wks presents for routine prenatal visit     Pt denies contractions, vaginal bleeding, leakage of fluid. Endorses fetal movement.     Patient was counseled about the labor process. Patient was counseled about possible need for operative delivery via vacuum or forceps. Indications for operative delivery discussed. Risks dicussed including but not limited to increased maternal risk of more severe laceration and small risk of  complications of intracranial hemorrhage, lacerations, nerve damage or fracture.     Discussed with patient potential indications of need for  section including arrest of dilation/descent, non-reassuring fetal status, or worsening maternal status. Risks of  section discussed including but not limited to infection, bleeding, injury to the surrounding organs including bowel and bladder. Medical and surgical management of post partum bleeding discussed. Risk of blood transfusion discussed, patient okay with receiving blood transfusion if necessary.     Patient verbalized understanding. All questions answered. Patient signed consent.     -28wk labs reviewed   -delivery consent signed, breast pump ordered, ped referral placed   -s/p Tdap and rhogam given today   - labor precautions provided   -return in 3 weeks

## 2023-12-27 NOTE — PROGRESS NOTES
28 weeks gestation of pregnancy  29yo  at 28.2wks presents for routine prenatal visit     Pt denies contractions, vaginal bleeding, leakage of fluid. Endorses fetal movement.     Patient was counseled about the labor process. Patient was counseled about possible need for operative delivery via vacuum or forceps. Indications for operative delivery discussed. Risks dicussed including but not limited to increased maternal risk of more severe laceration and small risk of  complications of intracranial hemorrhage, lacerations, nerve damage or fracture.     Discussed with patient potential indications of need for  section including arrest of dilation/descent, non-reassuring fetal status, or worsening maternal status. Risks of  section discussed including but not limited to infection, bleeding, injury to the surrounding organs including bowel and bladder. Medical and surgical management of post partum bleeding discussed. Risk of blood transfusion discussed, patient okay with receiving blood transfusion if necessary.     Patient verbalized understanding. All questions answered. Patient signed consent.     -28wk labs reviewed   -delivery consent signed, breast pump ordered, ped referral placed   -s/p Tdap and rhogam given today   - labor precautions provided   -return in 3 weeks     Abnormal glucose affecting pregnancy  -pt opting for 1 week glucose checks instead of 3hr GTT   -M diabetic teaching referral placed today     Obesity affecting pregnancy in third trimester  -continue ASA  -growth scheduled 24    Rh negative state in antepartum period, second trimester  -rhogam given today

## 2023-12-27 NOTE — ASSESSMENT & PLAN NOTE
-pt opting for 1 week glucose checks instead of 3hr GTT   -MFM diabetic teaching referral placed today

## 2023-12-28 ENCOUNTER — TELEPHONE (OUTPATIENT)
Dept: PERINATAL CARE | Facility: CLINIC | Age: 28
End: 2023-12-28

## 2023-12-28 NOTE — TELEPHONE ENCOUNTER
Called patient to schedule MFM appointment, based on referral issued to Maternal Fetal Medicine by OB office.    Left voicemail requesting patient to call back and schedule appointment, with office number for return call [phone].

## 2023-12-29 LAB
DME PARACHUTE DELIVERY DATE ACTUAL: NORMAL
DME PARACHUTE DELIVERY DATE REQUESTED: NORMAL
DME PARACHUTE ITEM DESCRIPTION: NORMAL
DME PARACHUTE ORDER STATUS: NORMAL
DME PARACHUTE SUPPLIER NAME: NORMAL
DME PARACHUTE SUPPLIER PHONE: NORMAL

## 2023-12-29 NOTE — PROGRESS NOTES
Daily Note     Today's date: 2024  Patient name: Elizabeth Bear  : 1995  MRN: [id number]  Referring provider: Shelly Forrest,*  Dx:   Encounter Diagnosis     ICD-10-CM    1. Back pain affecting pregnancy in second trimester  O99.891     M54.9           Start Time: 1630  Stop Time: 1715  Total time in clinic (min): 45 minutes    Subjective: Headache more today, not sure if due to stressful day at work. Carpal tunnel is worst, might need to start wearing splints during the day. Did have 2 BM yesterday which was good.       Objective: See treatment diary below      Assessment: Patient is 29 weeks pregnant. Patient continuing to benefit from cervical manual therapy for her headaches. Began  labor prep education including perineal massage and stretching her hips and pelvis with squats and lunging in this late phase of her third trimester. Educated on recognizing true labor contractions vs [place] contractions, early labor and encouraging it with upright positions including standing and walking. Educated patient on proper breathing techniques to relax pelvic floor to encourage labor. Patient verbalizing good understanding of education today but will likely benefit from reinforcement in future sessions. Will educate patient on laboring positions next session.       Plan: Continue per plan of care.      Diagnosis: Pregnancy related back pain and neck pain, KWAME: 3/18/24   Precautions: pregnant, POTS, endometriosis         Vitals      *labor positions    Patient Ed          Constipation management          Sleeping  Pillows (2 neck + Zaida pillow), 1 under arm, 1 between legs        Belly band  Recommend   Instructed patient on how to use     Labor prep     Breathing, perineal massage, early labor                                   Neuro Re-Ed          Hug the baby                                                                      Ther Ex          Aerobic  "conditioning L5 8'  L5 8' Nustep  L5 8' Nustep  L5 5'     Cat-cow 10 x  mod standing still P!          Thread the needle 5 x          Prayer stretch          Supine piriformis stretch 3 x 10'' mod seated p! Standing performed          Suboccipital stretch          No Money  Blue band distributed  Rows  Magenta  2 x 10      Wall lift  3 x 10 3 x 10 3 x 15      Pball CW, CCW, stretches, pelvic tilts multifidus rotation YCO (20 of each)  CW, CCW, stretches, pelvic tilts     multifidus rotation YCO (20 of each)       Bridges  2 x 10 2 x 10 HEP      Clams  GTB  3 x 10 GTB  3 x 10 HEP      Sit to stands  2 x 15 2 x 15 Use UE for support as needed      Side steps    [place] TB at thigh and ankles  3 laps x 12ft      Calf raise  2 x 15-20 b/l 2 x 15-20 b/l HEP      Quadruped  Alt arm raises  2 x 10 ea    Alt leg raise  6x ea Alt arm raises  2 x 10 ea    Alt leg raise  6x ea Modify to counter support    Alt Leg raise    Incline push up      Step ups    6\"  3 x 30s                Ther Activity                              Manual          Suboccipital release 15'  15'  10'     L/s paraspinals pregnancy pillow                                        Modalities                                          "

## 2024-01-02 ENCOUNTER — OFFICE VISIT (OUTPATIENT)
Dept: PHYSICAL THERAPY | Facility: REHABILITATION | Age: 29
End: 2024-01-02
Payer: COMMERCIAL

## 2024-01-02 DIAGNOSIS — O99.891 BACK PAIN AFFECTING PREGNANCY IN SECOND TRIMESTER: Primary | ICD-10-CM

## 2024-01-02 DIAGNOSIS — M54.9 BACK PAIN AFFECTING PREGNANCY IN SECOND TRIMESTER: Primary | ICD-10-CM

## 2024-01-02 PROCEDURE — 97530 THERAPEUTIC ACTIVITIES: CPT | Performed by: PHYSICAL THERAPIST

## 2024-01-02 PROCEDURE — 97140 MANUAL THERAPY 1/> REGIONS: CPT | Performed by: PHYSICAL THERAPIST

## 2024-01-03 ENCOUNTER — TELEMEDICINE (OUTPATIENT)
Facility: HOSPITAL | Age: 29
End: 2024-01-03
Payer: COMMERCIAL

## 2024-01-03 DIAGNOSIS — R73.09 ELEVATED GLUCOSE: ICD-10-CM

## 2024-01-03 DIAGNOSIS — O99.810 ABNORMAL GLUCOSE AFFECTING PREGNANCY: ICD-10-CM

## 2024-01-03 DIAGNOSIS — Z36.9 PRENATAL SCREENING ENCOUNTER: Primary | ICD-10-CM

## 2024-01-03 DIAGNOSIS — Z3A.29 29 WEEKS GESTATION OF PREGNANCY: ICD-10-CM

## 2024-01-03 PROCEDURE — G0108 DIAB MANAGE TRN  PER INDIV: HCPCS

## 2024-01-03 RX ORDER — PERPHENAZINE 16 MG/1
TABLET, FILM COATED ORAL
Qty: 100 STRIP | Refills: 0 | Status: SHIPPED | OUTPATIENT
Start: 2024-01-03 | End: 2024-03-18

## 2024-01-03 RX ORDER — LANCETS
EACH MISCELLANEOUS
Qty: 100 EACH | Refills: 0 | Status: SHIPPED | OUTPATIENT
Start: 2024-01-03 | End: 2024-03-18

## 2024-01-03 RX ORDER — BLOOD-GLUCOSE METER
KIT MISCELLANEOUS
Qty: 1 KIT | Refills: 0 | Status: SHIPPED | OUTPATIENT
Start: 2024-01-03 | End: 2024-03-18

## 2024-01-03 NOTE — PROGRESS NOTES
Meter Education  (virtual visit)    Thank you for referring your patient to [facility] Maternal Fetal Medicine Diabetes and Pregnancy Program.     Elizabeth Bear is a  28 y.o. female who presents today alone for Meter Education.    Patient is at 29w2d gestation, Estimated Date of Delivery: 3/18/24.     Reviewed and updated the following from patients medical record: PMH, Problem List, Allergies, and Current Medications.    Visit Diagnosis:  Encounter Diagnosis     ICD-10-CM    1. Prenatal screening encounter  Z36.9       2. Elevated glucose  R73.09       3. 29 weeks gestation of pregnancy  Z3A.29            Reason for meter education: 1 Week of SMBG to Rule Out GDM    Labs  GDM LABS:  1 Hour GTT:   Lab Results   Component Value Date/Time    [id number] 173 (H) 12/16/2023 08:05 AM      3 Hour GTT:   Lab Results   Component Value Date/Time    GLUF 82 09/02/2023 07:12 AM    BEKYAKH2ZV 168 09/02/2023 08:18 AM    SFLVLDF7CG 142 09/02/2023 09:19 AM    JOLNBOK9CF 44 (L) 09/02/2023 10:18 AM        A1C:  Lab Results   Component Value Date/Time    HGBA1C 5.5 08/26/2023 09:20 AM        Labs Ordered This Visit: None    Current Medications  Current Outpatient Medications   Medication Instructions    albuterol (ProAir HFA) 90 mcg/act inhaler 2 puffs, Inhalation, Every 6 hours PRN    albuterol 2.5 mg, Nebulization, Every 6 hours PRN    aspirin 162 mg, Oral, Daily    Elastic Bandages & Supports (Abdominal Binder/Elastic XL) MISC Does not apply, As needed, Pregnancy belly band    Elastic Bandages & Supports (Wrist Splint/Left Medium) MISC Does not apply, Daily at bedtime    magnesium oxide (MAG-OX) 400 mg, Oral, Daily    magnesium Oxide (MAG-OX) 400 mg, Daily    nystatin (MYCOSTATIN) powder Topical, 3 times daily    Prenatal Vit-Fe Fumarate-FA (PRENATAL PO) Oral        Preferred Pharmacy:   [facility] - [address],  [address],  Ground Floor [facility] [address]  Phone:  [phone] Fax: [phone]    [id number] - [facility] [place] - [address]  [address] [facility]  [place] [address]  Phone: [phone] Fax: [phone]    [id number] [place] [place] - [address]  [address]  [place] [address]  Phone: [phone] Fax: [phone]    [facility]  [address]  [place] [address]  Phone: [phone] Fax: [phone]       Anthropometrics:  Ht Readings from Last 1 Encounters:   11/30/23 5' 6.25\" (1.683 m)      Wt Readings from Last 3 Encounters:   12/27/23 114 kg (251 lb 12.8 oz)   11/30/23 112 kg (248 lb)   11/27/23 112 kg (248 lb)        Pre-Gravid Wt Pre-Gravid BMI TWG   108 kg (238 lb) 38.11 6.26 kg (13 lb 12.8 oz)     Total Pregnancy Weight Gain Recommendations: BMI (> 30) 11-20 lbs  Current Wt Status Compared to Recommendations: Within Range -- Continue recommended rate of weight gain based on pre-pregnancy BMI till delivery    Most Recent Ultrasound Results:  Findings: NML Growth/EDOUARD- 11/3/23   Further Fetal Surveillance: None  Next US date: Scheduled Appropriately 1/19/24     Blood Glucose Monitoring:     Glucose Meter: Contour Next EZ  *orders for supplies (meter, lancets, strips) based on patients needs pended/routed to appropriate provider after today's visit for review/approval     Instructed on Testing Blood Sugars: 4 x per day (Fasting, 2 hour after start of each meal)  Goals: (Fasting) 60-90mg/dl // (1hr PP) <140mg/dl // (2hr PP) <120mg/dl  Meter Teaching: Gave instruction on site selection, skin preparation, loading strips and lancet device, meter activation, obtaining blood sample, test strip and lancet disposal and storage, and recording log book entries.   Blood Sugar Tested in Office? No (Virtual Visit)  Instructed to write down blood sugars and report blood sugars after 1 week via MyChart message to the OB to determine possible GDM Dx     Date to Report Blood Sugars: 1 Week   *Pt was instructed to " report blood glucose log to OB to determine possible GDM dx via [employer] Message    Follow-Up: No follow-ups on file.     Begin Time: 12:58  End Time: 1:18 pm     It was a pleasure working with them today. Please feel free to call with any questions or concerns.    Kayla Belcher RN   Diabetes Educator  [facility] Maternal Fetal Medicine  Diabetes and Pregnancy Program  [address], [address]  [place] [address]    Virtual Regular Visit    Verification of patient location:    Patient is located at Other in the following state in which I hold an active license [place]      Assessment/Plan:    Problem List Items Addressed This Visit       Abnormal glucose affecting pregnancy     Other Visit Diagnoses       Prenatal screening encounter    -  Primary    Elevated glucose        29 weeks gestation of pregnancy                     Reason for visit is   Chief Complaint   Patient presents with    Virtual Regular Visit    Patient Education     Meter education     Virtual Regular Visit          Encounter provider Kayla Belcher RN    Provider located at [address] [address]      Recent Visits  No visits were found meeting these conditions.  Showing recent visits within past 7 days and meeting all other requirements  Today's Visits  Date Type Provider Dept   24 Telemedicine Kayla Belcher RN [facility]    Showing today's visits and meeting all other requirements  Future Appointments  No visits were found meeting these conditions.  Showing future appointments within next 150 days and meeting all other requirements       The patient was identified by name and date of birth. Elizabeth Bear was informed that this is a telemedicine visit and that the visit is being conducted through the [employer] platform. She agrees to proceed..  My office door was closed. No one else was in the room.  She  acknowledged consent and understanding of privacy and security of the video platform. The patient has agreed to participate and understands they can discontinue the visit at any time.    Patient is aware this is a billable service.     Zulema Bear is a 28 y.o. female  .      HPI     Past Medical History:   Diagnosis Date    Abnormal Pap smear of cervix 01/26/2023    ASCUS HPV other    Allergic rhinitis     Anxiety     Asthma     Cyst of right ovary 03/08/2022    Depression     well managed with therapy    Ectopic pregnancy 03/08/2022    Questional cyst vs ectopic    Endometriosis     Intractable migraine with status migrainosus 10/08/2022    Miscarriage 02/2022    POTS (postural orthostatic tachycardia syndrome)     PTSD (post-traumatic stress disorder)     S/P D&C (status post dilation and curettage) 03/13/2022    S/P ovarian cystectomy 03/13/2022       Past Surgical History:   Procedure Laterality Date    DILATION AND CURETTAGE OF UTERUS N/A 03/08/2022    Procedure: DILATATION AND CURETTAGE (D&C);  Surgeon: Serenity Livingston MD;  [facility];  Service: Gynecology    LAPAROSCOPIC ENDOMETRIOSIS FULGURATION  03/03/2021    WY LAPS ABD PRTM&OMENTUM DX W/WO SPEC BR/WA SPX N/A 03/08/2022    Procedure: LAPAROSCOPY DIAGNOSTIC, right ovarian cystectomy;  Surgeon: Serenity Livingston MD;  Location[facility];  Service: Gynecology       Current Outpatient Medications   Medication Sig Dispense Refill    albuterol (2.5 mg/3 mL) 0.083 % nebulizer solution Take 3 mL (2.5 mg total) by nebulization every 6 (six) hours as needed for wheezing or shortness of breath 90 mL 0    albuterol (ProAir HFA) 90 mcg/act inhaler Inhale 2 puffs every 6 (six) hours as needed for wheezing 8.5 g 0    aspirin 81 mg chewable tablet Chew 2 tablets (162 mg total) daily 180 tablet 3    Elastic Bandages & Supports (Abdominal Binder/Elastic XL) MISC Use if needed (back pain) Pregnancy belly band 1 each 0    Elastic Bandages & Supports  (Wrist Splint/Left Medium) MISC Use daily at bedtime 1 each 0    magnesium oxide (MAG-OX) 400 mg tablet Take 1 tablet (400 mg total) by mouth daily 90 tablet 1    nystatin (MYCOSTATIN) powder Apply topically 3 (three) times a day 30 g 0    Prenatal Vit-Fe Fumarate-FA (PRENATAL PO) Take by mouth      magnesium Oxide (MAG-OX) 400 mg TABS Take 400 mg by mouth daily (Patient not taking: Reported on 12/27/2023)       No current facility-administered medications for this visit.        No Known Allergies    Review of Systems    Video Exam    There were no vitals filed for this visit.    Physical Exam     Visit Time  Total Visit Duration: 20 minutes

## 2024-01-03 NOTE — PATIENT INSTRUCTIONS
Thank you for attending our glucose meter education class. The purpose of this testing is to rule out gestational diabetes. As we discussed and reviewed please follow our instructions. Report blood sugars by 24  to rule out GDM. Your blood sugar log will then be sent to your OBGYN for review and final determination of GDM. If you are gestational diabetic, your OBGYN will place a referral for our program. Continue to check your blood sugars until otherwise instructed by your OBGYN and/or diabetes team.     A request for Contour Next glucose meter, test strips and lancets was sent to the provider for approval. Once your prescriptions are approved by the provider, your prescription will be sent electronically to your pharmacy. Be mindful the provider is seeing patients in the office today so allow ample time for your prescriptions to be approved. Call your pharmacy to verify your medication was received electronically and is ready for pick-up before going to pharmacy. If you have any issues with coverage or your meter is unavailable, please reach out to our office at [phone].     Self-monitoring Blood Glucose:  Check your blood sugar 4 times a day   Fastin-10 hours from your last meal/snack. Check before you start your day.    2 hours after the first bite of breakfast   2 hours after the first bite of lunch  2 hours after the first bite of dinner   Goal Range:  Fasting blood sugar (FBS):  60-90 mg/dL  2 hour after meals:  120 or less       How to check your blood sugar:   Wash your hands with soap and water or use waterless  to clean your hands.  Alcohol can dry your fingers and is not recommended.  Alcohol can also cause false, elevated glucose readings.  AVOID scented soaps   Gather your glucose meter kit and supplies.  Prepare your meter by inserting a new test strip.   This will automatically turn on your meter  Make sure test strips are not .  Use a new test strip each time.   Prepare  your lancing device by inserting the lancet               After the lancet is securely in place, twist off the top to reveal needle.   Reattach lancing device top   Once lancing device top is reattached, you are now ready to prick the side of your fingertip to get a blood sample.  You can adjust the depth setting as needed.   Never the top or middle of your fingertip.   Apply the blood sample to test strip according to instructions.   Please notice if blood sample is needed on the top of test strip or the side. Reference meter instruction booklet.   Make sure your sharp container is: heavy duty plastic, puncture-resistant lid, upright and stable, leak resistant, properly labeled.   Record your blood sugar(s)    For references and video: go to - https://[email or web address].com/products/glucose-meters/onetouch-verio-flex    Any questions give us a call at [phone].  Kayla POWELL

## 2024-01-08 ENCOUNTER — OFFICE VISIT (OUTPATIENT)
Dept: PHYSICAL THERAPY | Facility: REHABILITATION | Age: 29
End: 2024-01-08
Payer: COMMERCIAL

## 2024-01-08 DIAGNOSIS — M54.9 BACK PAIN AFFECTING PREGNANCY IN SECOND TRIMESTER: Primary | ICD-10-CM

## 2024-01-08 DIAGNOSIS — O99.891 BACK PAIN AFFECTING PREGNANCY IN SECOND TRIMESTER: Primary | ICD-10-CM

## 2024-01-08 PROCEDURE — 97110 THERAPEUTIC EXERCISES: CPT | Performed by: PHYSICAL THERAPIST

## 2024-01-08 PROCEDURE — 97530 THERAPEUTIC ACTIVITIES: CPT | Performed by: PHYSICAL THERAPIST

## 2024-01-08 NOTE — PROGRESS NOTES
Pt presents today for routine OB visit. Reports some more pelvic pressure and cramping this week. Shoveled snow over the weekend and worked with pelvic floor PT yesterday. Reports cramping and muscular like pain in lower pelvis. Denies overt patterned or time-able contractions. Denies VB, LOF, dysuria, cloudy urine, fever, chills.    She reports meeting fetal kick counts daily. Feels like baby is just meeting kick counts at times. Also reports periods of the day where movement is less so than others.    She has stated QID fingerstick checks. She elected to do this instead of 3 hour GTT for elevated 1 hour. She has about 5 days of readings recorded. Two fasting elevations were noted. Remaining fasting levels and all postprandial levels are wnl. Advised another week of QID blood sugar checks and reporting.    She continues to report b/l breast itching, rash, irritation. Additionally reports pain and itching at nipples b/l. Hyperpigmentation changes noted to breasts b/l. Right nipple is particularly tender and reports pain behind the nipple. Denies lumps or masses.    EXAM  Breasts- hyperpigmentation skin changes noted on b/l breast; evidence of resolving skin candidiasis rash noted under both breasts b/l. Right nipple with hard thickened appearance to skin with flaky dry skin noted. Left nipple has less of a thickened appearance but with patchy flaky skin; pt reports itching at site of skin flaking on both nipples b/l    30 weeks gestation of pregnancy  Elizabeth  is a 28 y.o.  @30w1d who presents for routine prenatal visit.        28 wk labs - complete; electing fingerstick checks instead of 3 hour; see separate diagnosis  Growth scan- scheduled  TDAP up to date  Delivery consent was signed previously  Delivery plan submitted and reviewed today    Reports good fetal movement. Meeting kick counts. Reassurance provided. Encouraged to eat or drink something sweet and lay down if ever worried about movement. If  not meeting kick counts at that time call office  Denies LOF, vaginal bleeding, regular uterine contractions, cramping, headaches or visual changes.      Reviewed PTL/Labor precautions and FKC.        Abnormal glucose affecting pregnancy  Completing fingerstick checks instead of 3 hour  First 5 days submitted today. Two elevated FBS. Remaining FBS and postprandial readings wnl  Advised pt check an additional full week of QID checks then report back    Elevated BP without diagnosis of hypertension  Normotensive today    Obesity affecting pregnancy in third trimester  Taking ASA  Has third trimester growth US scheduled    Rh negative state in antepartum period, third trimester  Rhogam received 12/27/23    Candida infection of flexural skin  Refill of nystatin powder provided to be used as needed    POTS (postural orthostatic tachycardia syndrome)  Had consult with cardiology on 11/30/23    Back pain affecting pregnancy in third trimester  Working with PT     Nipple dermatitis  Given appearance of nipples b/l suspect an element of dermatitis  Advised trial of BID hydrocortisone cream x 10-14 days with emollient such as aquaphor/vaseline   Given thickened appearance of nipple and reported persistent pain orders placed for b/l breast US given chronicity of this concern in pregnancy  Also advised pt to stop wearing bra at night    Pelvic cramping  Suspect musculoskeletal given recent change in activity level and recent PT visit  Will obtain UA C&S to completely r/o urinary cause for her symptoms  Reviewed PTL precautions

## 2024-01-09 ENCOUNTER — CLINICAL SUPPORT (OUTPATIENT)
Dept: POSTPARTUM | Facility: CLINIC | Age: 29
End: 2024-01-09

## 2024-01-09 ENCOUNTER — TELEPHONE (OUTPATIENT)
Dept: POSTPARTUM | Facility: CLINIC | Age: 29
End: 2024-01-09

## 2024-01-09 ENCOUNTER — ROUTINE PRENATAL (OUTPATIENT)
Dept: OBGYN CLINIC | Facility: CLINIC | Age: 29
End: 2024-01-09
Payer: COMMERCIAL

## 2024-01-09 VITALS
SYSTOLIC BLOOD PRESSURE: 110 MMHG | WEIGHT: 254.8 LBS | BODY MASS INDEX: 40.82 KG/M2 | OXYGEN SATURATION: 100 % | HEART RATE: 99 BPM | DIASTOLIC BLOOD PRESSURE: 74 MMHG

## 2024-01-09 DIAGNOSIS — E66.8 OTHER OBESITY AFFECTING PREGNANCY IN THIRD TRIMESTER: ICD-10-CM

## 2024-01-09 DIAGNOSIS — R03.0 ELEVATED BP WITHOUT DIAGNOSIS OF HYPERTENSION: ICD-10-CM

## 2024-01-09 DIAGNOSIS — G90.A POTS (POSTURAL ORTHOSTATIC TACHYCARDIA SYNDROME): ICD-10-CM

## 2024-01-09 DIAGNOSIS — Z67.91 RH NEGATIVE STATE IN ANTEPARTUM PERIOD, THIRD TRIMESTER: ICD-10-CM

## 2024-01-09 DIAGNOSIS — O26.893 RH NEGATIVE STATE IN ANTEPARTUM PERIOD, THIRD TRIMESTER: ICD-10-CM

## 2024-01-09 DIAGNOSIS — O92.29 BREAST PAIN DURING PREGNANCY: ICD-10-CM

## 2024-01-09 DIAGNOSIS — O99.891 BACK PAIN AFFECTING PREGNANCY IN THIRD TRIMESTER: ICD-10-CM

## 2024-01-09 DIAGNOSIS — B37.2 CANDIDA INFECTION OF FLEXURAL SKIN: ICD-10-CM

## 2024-01-09 DIAGNOSIS — N64.4 BREAST PAIN DURING PREGNANCY: ICD-10-CM

## 2024-01-09 DIAGNOSIS — Z34.03 PRENATAL CARE, FIRST PREGNANCY, THIRD TRIMESTER: ICD-10-CM

## 2024-01-09 DIAGNOSIS — M54.9 BACK PAIN AFFECTING PREGNANCY IN THIRD TRIMESTER: ICD-10-CM

## 2024-01-09 DIAGNOSIS — O99.213 OTHER OBESITY AFFECTING PREGNANCY IN THIRD TRIMESTER: ICD-10-CM

## 2024-01-09 DIAGNOSIS — L81.9 HYPERPIGMENTATION: ICD-10-CM

## 2024-01-09 DIAGNOSIS — O99.810 ABNORMAL GLUCOSE AFFECTING PREGNANCY: ICD-10-CM

## 2024-01-09 DIAGNOSIS — Z3A.30 30 WEEKS GESTATION OF PREGNANCY: Primary | ICD-10-CM

## 2024-01-09 DIAGNOSIS — Z32.2 ENCOUNTER FOR CHILDBIRTH INSTRUCTION: Primary | ICD-10-CM

## 2024-01-09 DIAGNOSIS — R10.2 PELVIC CRAMPING: ICD-10-CM

## 2024-01-09 DIAGNOSIS — L30.9 NIPPLE DERMATITIS: ICD-10-CM

## 2024-01-09 LAB
BACTERIA UR QL AUTO: ABNORMAL /HPF
BILIRUB UR QL STRIP: NEGATIVE
CLARITY UR: CLEAR
COLOR UR: ABNORMAL
GLUCOSE UR STRIP-MCNC: NEGATIVE MG/DL
HGB UR QL STRIP.AUTO: NEGATIVE
KETONES UR STRIP-MCNC: NEGATIVE MG/DL
LEUKOCYTE ESTERASE UR QL STRIP: NEGATIVE
MUCOUS THREADS UR QL AUTO: ABNORMAL
NITRITE UR QL STRIP: NEGATIVE
NON-SQ EPI CELLS URNS QL MICRO: ABNORMAL /HPF
PH UR STRIP.AUTO: 7.5 [PH]
PROT UR STRIP-MCNC: ABNORMAL MG/DL
RBC #/AREA URNS AUTO: ABNORMAL /HPF
SL AMB  POCT GLUCOSE, UA: ABNORMAL
SL AMB POCT URINE PROTEIN: ABNORMAL
SP GR UR STRIP.AUTO: 1.02 (ref 1–1.03)
UROBILINOGEN UR STRIP-ACNC: <2 MG/DL
WBC #/AREA URNS AUTO: ABNORMAL /HPF

## 2024-01-09 PROCEDURE — 81001 URINALYSIS AUTO W/SCOPE: CPT | Performed by: PHYSICIAN ASSISTANT

## 2024-01-09 PROCEDURE — PNV: Performed by: PHYSICIAN ASSISTANT

## 2024-01-09 PROCEDURE — 81002 URINALYSIS NONAUTO W/O SCOPE: CPT | Performed by: PHYSICIAN ASSISTANT

## 2024-01-09 PROCEDURE — 87086 URINE CULTURE/COLONY COUNT: CPT | Performed by: PHYSICIAN ASSISTANT

## 2024-01-09 RX ORDER — NYSTATIN 100000 [USP'U]/G
POWDER TOPICAL 3 TIMES DAILY
Qty: 30 G | Refills: 0 | Status: SHIPPED | OUTPATIENT
Start: 2024-01-09

## 2024-01-09 RX ORDER — NYSTATIN 100000 [USP'U]/G
POWDER TOPICAL 3 TIMES DAILY
Qty: 30 G | Refills: 0 | Status: SHIPPED | OUTPATIENT
Start: 2024-01-09 | End: 2024-01-09 | Stop reason: SDUPTHER

## 2024-01-09 NOTE — PROGRESS NOTES
H&P reviewed.  The patient was examined and there are no changes to the H&P  Prenatal visit  GA 30w 1d  Patient complainting of pelvic cramping   Denies any vaginal bleeding or Leaking of fluid    Normal Fetal movement   28 wk labs completed, abnormal 1 HR GTT ( Daily glucose check  S/p Tdap / Flu vaccines and Rhoam   Delivery consent signed on 12/29/2023  Has  Breast pump   Has Pediatrician

## 2024-01-09 NOTE — TELEPHONE ENCOUNTER
Pediatric Referral from an OB Practice:    Is this the patients first baby?: YES, HAVING A BOY      Is it important for the pediatrician to have evening/weekend hours?: YES    Is it important that the pediatrician speaks a language other than English?: NO    Do you prefer the idea of a small practice or a larger practice? NO PREFERENCE     Pediatric practice chosen: [facility] [place]

## 2024-01-10 LAB — BACTERIA UR CULT: NORMAL

## 2024-01-10 NOTE — ASSESSMENT & PLAN NOTE
Suspect musculoskeletal given recent change in activity level and recent PT visit  Will obtain UA C&S to completely r/o urinary cause for her symptoms  Reviewed PTL precautions

## 2024-01-10 NOTE — ASSESSMENT & PLAN NOTE
Given appearance of nipples b/l suspect an element of dermatitis  Advised trial of BID hydrocortisone cream x 10-14 days with emollient such as aquaphor/vaseline   Given thickened appearance of nipple and reported persistent pain orders placed for b/l breast US given chronicity of this concern in pregnancy  Also advised pt to stop wearing bra at night

## 2024-01-10 NOTE — ASSESSMENT & PLAN NOTE
Elizabeth  is a 28 y.o.  @30w1d who presents for routine prenatal visit.        28 wk labs - complete; electing fingerstick checks instead of 3 hour; see separate diagnosis  Growth scan- scheduled  TDAP up to date  Delivery consent was signed previously  Delivery plan submitted and reviewed today    Reports good fetal movement. Meeting kick counts. Reassurance provided. Encouraged to eat or drink something sweet and lay down if ever worried about movement. If not meeting kick counts at that time call office  Denies LOF, vaginal bleeding, regular uterine contractions, cramping, headaches or visual changes.      Reviewed PTL/Labor precautions and FKC.

## 2024-01-10 NOTE — ASSESSMENT & PLAN NOTE
Completing fingerstick checks instead of 3 hour  First 5 days submitted today. Two elevated FBS. Remaining FBS and postprandial readings wnl  Advised pt check an additional full week of QID checks then report back

## 2024-01-15 ENCOUNTER — HOSPITAL ENCOUNTER (OUTPATIENT)
Facility: HOSPITAL | Age: 29
Discharge: HOME | End: 2024-01-15
Attending: OBSTETRICS & GYNECOLOGY | Admitting: OBSTETRICS & GYNECOLOGY
Payer: COMMERCIAL

## 2024-01-15 ENCOUNTER — TELEPHONE (OUTPATIENT)
Dept: OBGYN CLINIC | Facility: CLINIC | Age: 29
End: 2024-01-15

## 2024-01-15 VITALS
RESPIRATION RATE: 20 BRPM | TEMPERATURE: 98.4 F | HEART RATE: 93 BPM | SYSTOLIC BLOOD PRESSURE: 116 MMHG | DIASTOLIC BLOOD PRESSURE: 68 MMHG
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
## 2024-01-15 PROCEDURE — 99213 OFFICE O/P EST LOW 20 MIN: CPT

## 2024-01-19 ENCOUNTER — TELEPHONE (OUTPATIENT)
Dept: OBGYN CLINIC | Facility: CLINIC | Age: 29
End: 2024-01-19

## 2024-01-19 ENCOUNTER — ULTRASOUND (OUTPATIENT)
Dept: PERINATAL CARE | Facility: CLINIC | Age: 29
End: 2024-01-19
Payer: COMMERCIAL

## 2024-01-19 VITALS
HEART RATE: 114 BPM | BODY MASS INDEX: 39.58 KG/M2 | SYSTOLIC BLOOD PRESSURE: 118 MMHG | HEIGHT: 67 IN | DIASTOLIC BLOOD PRESSURE: 80 MMHG | WEIGHT: 252.2 LBS

## 2024-01-19 DIAGNOSIS — O99.810 ABNORMAL GLUCOSE AFFECTING PREGNANCY: ICD-10-CM

## 2024-01-19 DIAGNOSIS — Z3A.31 31 WEEKS GESTATION OF PREGNANCY: Primary | ICD-10-CM

## 2024-01-19 DIAGNOSIS — Z36.89 ENCOUNTER FOR ULTRASOUND TO CHECK FETAL GROWTH: ICD-10-CM

## 2024-01-19 DIAGNOSIS — O99.213 OBESITY AFFECTING PREGNANCY IN THIRD TRIMESTER, UNSPECIFIED OBESITY TYPE: ICD-10-CM

## 2024-01-19 PROCEDURE — 76816 OB US FOLLOW-UP PER FETUS: CPT | Performed by: STUDENT IN AN ORGANIZED HEALTH CARE EDUCATION/TRAINING PROGRAM

## 2024-01-19 PROCEDURE — 99213 OFFICE O/P EST LOW 20 MIN: CPT | Performed by: STUDENT IN AN ORGANIZED HEALTH CARE EDUCATION/TRAINING PROGRAM

## 2024-01-19 NOTE — PROGRESS NOTES
"[facility]: Ms. Bear was seen today for fetal growth assessment ultrasound.  See ultrasound report under \"OB Procedures\" tab.      MDM:   I. Diagnoses/Problems addressed:  Abnormal glucose   II.  Data:  glucose log, 1 hour glucola  III.  Risk of morbidity: Low    Please don't hesitate to contact our office with any concerns or questions.  -Kayleen Hernandez MD    "

## 2024-01-20 ENCOUNTER — CLINICAL SUPPORT (OUTPATIENT)
Age: 29
End: 2024-01-20

## 2024-01-20 DIAGNOSIS — Z32.2 ENCOUNTER FOR CHILDBIRTH INSTRUCTION: Primary | ICD-10-CM

## 2024-01-21 ENCOUNTER — NURSE TRIAGE (OUTPATIENT)
Dept: OTHER | Facility: OTHER | Age: 29
End: 2024-01-21

## 2024-01-21 NOTE — TELEPHONE ENCOUNTER
"  Reason for Disposition  • Health Information question, no triage required and triager able to answer question    Answer Assessment - Initial Assessment Questions  1. REASON FOR CALL or QUESTION: \"What is your reason for calling today?\" or \"How can I best help you?\" or \"What question do you have that I can help answer?\"      Patient calling in due to being 31w6d pregnant and just ate  parmesan cheese 30 minutes ago at her neighbors house. Stated the cheese was pinkish in color and when she checked the label it says it  in 2022.    Protocols used: Information Only Call - No Triage-ADULT-    "

## 2024-01-21 NOTE — TELEPHONE ENCOUNTER
"Regardin weeks preg / Ate  food  ----- Message from Halley Leon sent at 2024  5:20 PM EST -----  \" I am 31 weeks pregnant and my neighbor made us italian food. She didn't know the cheese  in 2022 I just want to make sure I am good\"    "

## 2024-01-21 NOTE — TELEPHONE ENCOUNTER
On call provider contacted- stated even moldy cheese is safe and won't harm pregnancy. Stated should watch out for signs of food poisoning such as vomiting, diarrhea, fever or chills. Recommends patient contact the office if any of those symptoms develop. Stated even with food poisoning during pregnancy a lot of the time the recommendation is to hydrate and watch symptoms. Patient made aware and verbalized understanding.

## 2024-01-23 ENCOUNTER — NURSE TRIAGE (OUTPATIENT)
Dept: OTHER | Facility: OTHER | Age: 29
End: 2024-01-23

## 2024-01-23 NOTE — TELEPHONE ENCOUNTER
"Regardin wks pregnant/ slid and fell on butt/ feeling okay  ----- Message from Destiny Canales sent at 2024  6:50 PM EST -----  Pt called, \" I am 32 wks pregnant and I recently slid and fell on my butt. I did not hit my stomach or head, and I feel okay.\"    "

## 2024-01-24 NOTE — TELEPHONE ENCOUNTER
"Reason for Disposition  • [1] Pregnant 20 or more weeks AND [2] fall to ground or floor (e.g., walking and tripped)    Answer Assessment - Initial Assessment Questions  1. MECHANISM: \"How did the fall happen?\"        Walking down icy hill in yard and fell backwards, landed on butt     2. DOMESTIC VIOLENCE SCREENING: \"Did you fall because someone pushed you or tried to hurt you?\"    denies    3. ONSET: \"When did the fall happen?\" (e.g., minutes, hours, or days ago)      10-15 minutes ago     4. LOCATION: \"What part of the body hit the ground?\" (e.g., back, buttocks, head, hips, knees, hands, head, stomach)      Buttocks     5. INJURY: \"Did you get hurt when you fell? Are there any obvious injuries?\" If Yes, ask: \"What does the injury look like?\"      Denies    6. PAIN: \"Is there any pain?\" If Yes, ask: \"How bad is the pain?\" (e.g., Scale 1-10; or mild,   moderate, severe)    - NONE (0): no pain    - MILD (1-3): doesn't interfere with normal activities     - MODERATE (4-7): interferes with normal activities or awakens from sleep     - SEVERE (8-10): excruciating pain, unable to do any normal activities       Denies    7. SIZE: For cuts, bruises, or swelling, ask: \"How large is it?\" (e.g., inches or centimeters)      N/A    8. KWAME: \"What date are you expecting to deliver?\"      3/18/24    9. FETAL MOVEMENT: \"Has the baby's movement decreased or changed significantly from   normal?\"      Yes    10. OTHER SYMPTOMS: \"Do you have any other symptoms?\" (e.g., stomach pain, contractions, vaginal bleeding, leaking of fluid from vagina)        Denies    Protocols used: Pregnancy - Fall-ADULT-AH    "

## 2024-01-25 ENCOUNTER — ROUTINE PRENATAL (OUTPATIENT)
Dept: OBGYN CLINIC | Facility: CLINIC | Age: 29
End: 2024-01-25
Payer: COMMERCIAL

## 2024-01-25 VITALS
BODY MASS INDEX: 40.25 KG/M2 | SYSTOLIC BLOOD PRESSURE: 126 MMHG | WEIGHT: 257 LBS | HEART RATE: 103 BPM | DIASTOLIC BLOOD PRESSURE: 64 MMHG

## 2024-01-25 DIAGNOSIS — Z34.03 PRENATAL CARE, FIRST PREGNANCY, THIRD TRIMESTER: Primary | ICD-10-CM

## 2024-01-25 DIAGNOSIS — Z3A.32 32 WEEKS GESTATION OF PREGNANCY: ICD-10-CM

## 2024-01-25 DIAGNOSIS — O99.810 ABNORMAL GLUCOSE AFFECTING PREGNANCY: ICD-10-CM

## 2024-01-25 DIAGNOSIS — O99.213 OBESITY AFFECTING PREGNANCY IN THIRD TRIMESTER, UNSPECIFIED OBESITY TYPE: ICD-10-CM

## 2024-01-25 DIAGNOSIS — R03.0 ELEVATED BP WITHOUT DIAGNOSIS OF HYPERTENSION: ICD-10-CM

## 2024-01-25 LAB
SL AMB  POCT GLUCOSE, UA: NORMAL
SL AMB POCT URINE PROTEIN: NORMAL

## 2024-01-25 PROCEDURE — PNV: Performed by: STUDENT IN AN ORGANIZED HEALTH CARE EDUCATION/TRAINING PROGRAM

## 2024-01-25 PROCEDURE — 81002 URINALYSIS NONAUTO W/O SCOPE: CPT | Performed by: STUDENT IN AN ORGANIZED HEALTH CARE EDUCATION/TRAINING PROGRAM

## 2024-01-25 NOTE — PROGRESS NOTES
32 weeks gestation of pregnancy  29yo  at 32.3wks presents for routine prenatal visit     Pt denies contractions, vaginal bleeding, leakage of fluid. Endorses fetal movement.     -continue PNVs   -s/p Tdap and rhogam, declined RSV   -delivery consent signed, has breast pump, has pediatrician   - labor precautions provided  -return in 2 weeks     Abnormal glucose affecting pregnancy  -passed one week of glucose monitoring, does not have GDM    Breech presentation  -repeat US scheduled 37wks   -discussed ECV if indicated    Elevated BP without diagnosis of hypertension  -BP today wnl

## 2024-01-25 NOTE — ASSESSMENT & PLAN NOTE
29yo  at 32.3wks presents for routine prenatal visit     Pt denies contractions, vaginal bleeding, leakage of fluid. Endorses fetal movement.     -continue PNVs   -s/p Tdap and rhogam, declined RSV   -delivery consent signed, has breast pump, has pediatrician   - labor precautions provided  -return in 2 weeks

## 2024-02-01 ENCOUNTER — TELEPHONE (OUTPATIENT)
Dept: OBGYN CLINIC | Facility: CLINIC | Age: 29
End: 2024-02-01

## 2024-02-01 ENCOUNTER — PATIENT MESSAGE (OUTPATIENT)
Dept: OBGYN CLINIC | Facility: CLINIC | Age: 29
End: 2024-02-01

## 2024-02-01 DIAGNOSIS — G56.03 BILATERAL CARPAL TUNNEL SYNDROME: Primary | ICD-10-CM

## 2024-02-01 NOTE — TELEPHONE ENCOUNTER
----- Message from Tarah Oconnor RN sent at 2/1/2024  1:19 PM EST -----  Regarding: FW: Carpal tunnel   Contact: [phone]    ----- Message -----  From: Elizabeth Bear  Sent: 2/1/2024   1:15 PM EST  To: [facility] [place] [facility]  Subject: Carpal tunnel                                    Good afternoon Dr Ordonez,    My Carpal Tunnel seems to have gotten progressively worse. I have it in both wrists and the pain is excruciating at times, even with braces on. Would I maybe be able to get a referral for steroid shots?       Thank you,  Nabila

## 2024-02-06 ENCOUNTER — OFFICE VISIT (OUTPATIENT)
Dept: OBGYN CLINIC | Facility: CLINIC | Age: 29
End: 2024-02-06
Payer: COMMERCIAL

## 2024-02-06 VITALS
HEIGHT: 67 IN | WEIGHT: 256 LBS | DIASTOLIC BLOOD PRESSURE: 70 MMHG | BODY MASS INDEX: 40.18 KG/M2 | SYSTOLIC BLOOD PRESSURE: 110 MMHG

## 2024-02-06 DIAGNOSIS — G56.00 CARPAL TUNNEL SYNDROME DURING PREGNANCY: Primary | ICD-10-CM

## 2024-02-06 DIAGNOSIS — O26.899 CARPAL TUNNEL SYNDROME DURING PREGNANCY: Primary | ICD-10-CM

## 2024-02-06 DIAGNOSIS — M65.4 DE QUERVAIN'S TENOSYNOVITIS, RIGHT: ICD-10-CM

## 2024-02-06 PROCEDURE — 20526 THER INJECTION CARP TUNNEL: CPT | Performed by: PHYSICIAN ASSISTANT

## 2024-02-06 PROCEDURE — 20550 NJX 1 TENDON SHEATH/LIGAMENT: CPT | Performed by: PHYSICIAN ASSISTANT

## 2024-02-06 PROCEDURE — 99203 OFFICE O/P NEW LOW 30 MIN: CPT | Performed by: PHYSICIAN ASSISTANT

## 2024-02-06 RX ORDER — LIDOCAINE HYDROCHLORIDE 10 MG/ML
1 INJECTION, SOLUTION INFILTRATION; PERINEURAL
Status: COMPLETED | OUTPATIENT
Start: 2024-02-06 | End: 2024-02-06

## 2024-02-06 RX ORDER — BETAMETHASONE SODIUM PHOSPHATE AND BETAMETHASONE ACETATE 3; 3 MG/ML; MG/ML
6 INJECTION, SUSPENSION INTRA-ARTICULAR; INTRALESIONAL; INTRAMUSCULAR; SOFT TISSUE
Status: COMPLETED | OUTPATIENT
Start: 2024-02-06 | End: 2024-02-06

## 2024-02-06 RX ADMIN — BETAMETHASONE SODIUM PHOSPHATE AND BETAMETHASONE ACETATE 6 MG: 3; 3 INJECTION, SUSPENSION INTRA-ARTICULAR; INTRALESIONAL; INTRAMUSCULAR; SOFT TISSUE at 15:30

## 2024-02-06 RX ADMIN — LIDOCAINE HYDROCHLORIDE 1 ML: 10 INJECTION, SOLUTION INFILTRATION; PERINEURAL at 15:30

## 2024-02-06 NOTE — PROGRESS NOTES
Patient Name:  Elizabeth Bear  MRN:  [id number]    Assessment & Plan     Bilateral Carpal tunnel syndrome during pregnancy.  Right de Quervain's tenosynovitis.  Possible right trigger thumb.  Patient was offered and accepted bilateral carpal tunnel corticosteroid injections today.  Patient was also offered and accepted a right first dorsal compartment corticosteroid junction as well.  Advise she monitor the symptoms at the volar base of the thumb as she is exhibiting signs of early onset trigger thumb.  If symptoms persist or worsen she may come in for a corticosteroid injection into the right thumb A1 pulley.  Continue night splinting and physical therapy.  Patient was also provided with a Comfort Cool thumb spica brace today for the right side to be worn while active during the day.  Follow-up as needed.    Chief Complaint     Bilateral hand numbness and tingling.    History of the Present Illness     Elizabeth Bear is a 28 y.o. right-hand-dominant female reports to the office today for evaluation of her bilateral hands.  Patient is currently 34 weeks pregnant she noted an onset of pain and numbness and tingling in the bilateral hands approximately 5 weeks ago.  She was diagnosed with carpal tunnel syndrome.  She has been utilizing night splints and performing occupational therapy with limited improvement.  She states the symptoms are worse in the right hand compared to the left.  She notes numbness and tingling primarily thumb index and long fingers with associated pain and discomfort.  Symptoms wake her up at night despite night splinting as well.  She denies dropping objects but notes difficulty picking up small objects.  Notes pain in the thumb.  Pain is localized to the volar base of the thumb.  Pain is worse with repetitive use and movement she does note occasional clicking but denies any locking or triggering.  She also notes pain at the radial aspect of the wrist.  Pain is also worse with  "repetitive use of the thumb.    Physical Exam     /70   Ht 5' 7\" (1.702 m)   Wt 116 kg (256 lb)   LMP 06/12/2023 (Exact Date)   BMI 40.10 kg/m²     Right hand: No gross deformity.  Skin intact.  No erythema ecchymosis or swelling.  No thenar atrophy.  There is tenderness along the first dorsal compartment.  Positive Finkelstein test.  No tenderness thumb CMC joint and negative CMC grind test.  Thumb range of motion is intact and limited with pain.  There is a small palpable nodule about the A1 pulley of the thumb with some clicking appreciated with range of motion.  No locking or triggering.  Full wrist range of motion with slight discomfort.  Full composite fist formation without significant pain.  Positive Tinel's and Durkan's compression test about the carpal tunnel.  Sensation is intact but diminished in the median nerve distribution.  Sensation intact ulnar and radial nerve distributions.  2+ radial pulse.  5 out of 5 APB strength.    Left hand: No gross deformity.  Skin is intact without erythema ecchymosis or swelling.  No thenar atrophy.  No tenderness first dorsal compartment.  No tenderness thumb CMC joint.  No tenderness about the A1 pulleys of the digits.  Full wrist range of motion with slight discomfort.  Full composite fist summation with slight discomfort as well.  Positive Tinel's and Durkan's compression test about the carpal tunnel.  Sensation intact but diminished in the median nerve distribution.  Sensation intact radial and ulnar nerves.  2+ radial pulse.  5 out of 5 APB strength.    Eyes: Anicteric sclerae.  ENT: Trachea midline.  Lungs: Normal respiratory effort.  CV: Capillary refill is less than 2 seconds.  Skin: Intact without erythema.  Lymph: No palpable lymphadenopathy.  Neuro: Sensation is grossly intact to light touch.  Psych: Mood and affect are appropriate.    Past Medical History:   Diagnosis Date    Abnormal Pap smear of cervix 01/26/2023    ASCUS HPV other    Allergic " rhinitis     Anxiety     Asthma     Cyst of right ovary 03/08/2022    Depression     well managed with therapy    Ectopic pregnancy 03/08/2022    Questional cyst vs ectopic    Endometriosis     Intractable migraine with status migrainosus 10/08/2022    Miscarriage 02/2022    POTS (postural orthostatic tachycardia syndrome)     PTSD (post-traumatic stress disorder)     S/P D&C (status post dilation and curettage) 03/13/2022    S/P ovarian cystectomy 03/13/2022       Past Surgical History:   Procedure Laterality Date    DILATION AND CURETTAGE OF UTERUS N/A 03/08/2022    Procedure: DILATATION AND CURETTAGE (D&C);  Surgeon: Serenity Livingston MD;  Location: [facility];  Service: Gynecology    LAPAROSCOPIC ENDOMETRIOSIS FULGURATION  03/03/2021    DC LAPS ABD PRTM&OMENTUM DX W/WO SPEC BR/WA SPX N/A 03/08/2022    Procedure: LAPAROSCOPY DIAGNOSTIC, right ovarian cystectomy;  Surgeon: Serenity Livingston MD;  Location: [facility];  Service: Gynecology       No Known Allergies    Current Outpatient Medications on File Prior to Visit   Medication Sig Dispense Refill    albuterol (2.5 mg/3 mL) 0.083 % nebulizer solution Take 3 mL (2.5 mg total) by nebulization every 6 (six) hours as needed for wheezing or shortness of breath 90 mL 0    albuterol (ProAir HFA) 90 mcg/act inhaler Inhale 2 puffs every 6 (six) hours as needed for wheezing 8.5 g 0    aspirin 81 mg chewable tablet Chew 2 tablets (162 mg total) daily 180 tablet 3    Elastic Bandages & Supports (Abdominal Binder/Elastic XL) MISC Use if needed (back pain) Pregnancy belly band 1 each 0    Elastic Bandages & Supports (Wrist Splint/Left Medium) MISC Use daily at bedtime 1 each 0    hydrocortisone 2.5 % cream Apply topically 2 (two) times a day Apply pea sized amount twice daily to nipple for 10-14 days 20 g 0    magnesium oxide (MAG-OX) 400 mg tablet Take 1 tablet (400 mg total) by mouth daily 90 tablet 1    nystatin (MYCOSTATIN) powder Apply topically 3 (three) times a day As  needed 30 g 0    Prenatal Vit-Fe Fumarate-FA (PRENATAL PO) Take by mouth      Blood Glucose Monitoring Suppl (Contour Next Gen Monitor) w/Device KIT Per insurance formulary. GDM (Patient not taking: Reported on 1/19/2024) 1 kit 0    Contour Next Test test strip Test 4 times a day. GDM (Patient not taking: Reported on 1/19/2024) 100 strip 0    Microlet Lancets MISC Use 4 a day. GDM (Patient not taking: Reported on 1/19/2024) 100 each 0     No current facility-administered medications on file prior to visit.       Social History     Tobacco Use    Smoking status: Never    Smokeless tobacco: Never   Vaping Use    Vaping status: Never Used   Substance Use Topics    Alcohol use: Not Currently    Drug use: Never       Family History   Problem Relation Age of Onset    Other Mother         hx od traumatic MVA-disabled now and partially paralized    Leukemia Maternal Grandmother 66    Stroke Maternal Grandmother     Heart attack Maternal Grandmother     No Known Problems Half-Sister     No Known Problems Half-Sister        Review of Systems     As stated in the HPI. All other systems reviewed and are negative.      Hand/upper extremity injection: bilateral carpal tunnel  Procedure Details  Condition:carpal tunnel syndrome Site: bilateral carpal tunnel   Needle size: 25 G  Ultrasound guidance: no  Approach: volar  Medications (Right): 1 mL lidocaine 1 %; 6 mg betamethasone acetate-betamethasone sodium phosphate 6 (3-3) mg/mLMedications (Left): 1 mL lidocaine 1 %; 6 mg betamethasone acetate-betamethasone sodium phosphate 6 (3-3) mg/mL   Patient tolerance: patient tolerated the procedure well with no immediate complications  Dressing:  Sterile dressing applied       Hand/upper extremity injection: R extensor compartment 1  Procedure Details  Condition:de Quervain's tenosynovitis Site: R extensor compartment 1   Needle size: 25 G  Ultrasound guidance: no  Approach: dorsal  Medications administered: 1 mL lidocaine 1 %; 6 mg  betamethasone acetate-betamethasone sodium phosphate 6 (3-3) mg/mL  Patient tolerance: patient tolerated the procedure well with no immediate complications  Dressing:  Sterile dressing applied

## 2024-02-08 ENCOUNTER — ROUTINE PRENATAL (OUTPATIENT)
Dept: OBGYN CLINIC | Facility: CLINIC | Age: 29
End: 2024-02-08
Payer: COMMERCIAL

## 2024-02-08 VITALS
WEIGHT: 256.6 LBS | SYSTOLIC BLOOD PRESSURE: 118 MMHG | BODY MASS INDEX: 40.19 KG/M2 | DIASTOLIC BLOOD PRESSURE: 74 MMHG | HEART RATE: 90 BPM

## 2024-02-08 DIAGNOSIS — J45.20 MILD INTERMITTENT ASTHMA WITHOUT COMPLICATION: ICD-10-CM

## 2024-02-08 DIAGNOSIS — Z3A.34 34 WEEKS GESTATION OF PREGNANCY: ICD-10-CM

## 2024-02-08 DIAGNOSIS — Z34.03 PRENATAL CARE, FIRST PREGNANCY, THIRD TRIMESTER: Primary | ICD-10-CM

## 2024-02-08 DIAGNOSIS — O26.893 RH NEGATIVE STATE IN ANTEPARTUM PERIOD, THIRD TRIMESTER: ICD-10-CM

## 2024-02-08 DIAGNOSIS — O99.810 ABNORMAL GLUCOSE AFFECTING PREGNANCY: ICD-10-CM

## 2024-02-08 DIAGNOSIS — M65.4 DE QUERVAIN'S TENOSYNOVITIS, RIGHT: ICD-10-CM

## 2024-02-08 DIAGNOSIS — Z67.91 RH NEGATIVE STATE IN ANTEPARTUM PERIOD, THIRD TRIMESTER: ICD-10-CM

## 2024-02-08 DIAGNOSIS — G90.A POTS (POSTURAL ORTHOSTATIC TACHYCARDIA SYNDROME): ICD-10-CM

## 2024-02-08 LAB
SL AMB  POCT GLUCOSE, UA: NEGATIVE
SL AMB POCT URINE PROTEIN: NEGATIVE

## 2024-02-08 PROCEDURE — PNV: Performed by: STUDENT IN AN ORGANIZED HEALTH CARE EDUCATION/TRAINING PROGRAM

## 2024-02-08 PROCEDURE — 81002 URINALYSIS NONAUTO W/O SCOPE: CPT | Performed by: STUDENT IN AN ORGANIZED HEALTH CARE EDUCATION/TRAINING PROGRAM

## 2024-02-08 NOTE — ASSESSMENT & PLAN NOTE
29 yo  at 34+3 here for ob visit. No contractions, leaking or bleeding. Good fetal movement. Having increasing pressure. Has some vaginal pain with insertion, we discussed exam vs treatment for yeast. Wants to try monistat and do exam at 36 wk visit if persistent. Return in 2 wks. Discussed GBS and exam option.

## 2024-02-08 NOTE — PROGRESS NOTES
Asthma  No current changes or concerns    POTS (postural orthostatic tachycardia syndrome)  BP normal, no new concerns    De Quervain's tenosynovitis, right  S/p steroid injections for carpal tunnel BL and right de quervains. Was not sleeping due to pain and discomfort.  Symptoms have improved some, sleep is still plus or minus with pregnancy but hand symptoms are not the source    34 weeks gestation of pregnancy  29 yo  at 34+3 here for ob visit. No contractions, leaking or bleeding. Good fetal movement. Having increasing pressure. Has some vaginal pain with insertion, we discussed exam vs treatment for yeast. Wants to try monistat and do exam at 36 wk visit if persistent. Return in 2 wks. Discussed GBS and exam option.    Abnormal glucose affecting pregnancy  Normal glucose for one week    Breech presentation  Vertex last week, starting fluid checks at 37 wks    Rh negative state in antepartum period, third trimester  S/p rhogam

## 2024-02-08 NOTE — PROGRESS NOTES
Patient here for prenatal visit.   GA: 34w3d    Denies leaking of fluid, bleeding, cramping  Normal Fetal Movement  Urine is negative  Labs/Rhogam/Tdap-Utd  RSV declined previously  Breast pump ordered previously  Delivery consent signed previously  Pediatrician referral given previously  No concerns at this time.

## 2024-02-08 NOTE — ASSESSMENT & PLAN NOTE
S/p steroid injections for carpal tunnel BL and right de quervains. Was not sleeping due to pain and discomfort.  Symptoms have improved some, sleep is still plus or minus with pregnancy but hand symptoms are not the source

## 2024-02-10 ENCOUNTER — NURSE TRIAGE (OUTPATIENT)
Dept: OTHER | Facility: OTHER | Age: 29
End: 2024-02-10

## 2024-02-10 ENCOUNTER — APPOINTMENT (EMERGENCY)
Dept: CT IMAGING | Facility: HOSPITAL | Age: 29
End: 2024-02-10
Payer: COMMERCIAL

## 2024-02-10 ENCOUNTER — HOSPITAL ENCOUNTER (EMERGENCY)
Facility: HOSPITAL | Age: 29
Discharge: HOME | End: 2024-02-10
Attending: EMERGENCY MEDICINE
Payer: COMMERCIAL

## 2024-02-10 VITALS
OXYGEN SATURATION: 96 % | TEMPERATURE: 97.9 F | SYSTOLIC BLOOD PRESSURE: 126 MMHG | RESPIRATION RATE: 18 BRPM | HEART RATE: 97 BPM | DIASTOLIC BLOOD PRESSURE: 73 MMHG

## 2024-02-10 DIAGNOSIS — N39.0 UTI (URINARY TRACT INFECTION): ICD-10-CM

## 2024-02-10 DIAGNOSIS — K44.9 HIATAL HERNIA: ICD-10-CM

## 2024-02-10 DIAGNOSIS — Z3A.34 34 WEEKS GESTATION OF PREGNANCY: ICD-10-CM

## 2024-02-10 DIAGNOSIS — R06.02 SHORTNESS OF BREATH: Primary | ICD-10-CM

## 2024-02-10 PROBLEM — O13.3 GESTATIONAL HYPERTENSION, THIRD TRIMESTER: Status: ACTIVE | Noted: 2024-02-10

## 2024-02-10 PROBLEM — O99.891 SHORTNESS OF BREATH DURING PREGNANCY: Status: ACTIVE | Noted: 2024-02-10

## 2024-02-10 LAB
ALBUMIN SERPL BCP-MCNC: 4 G/DL (ref 3.5–5)
ALP SERPL-CCNC: 105 U/L (ref 34–104)
ALT SERPL W P-5'-P-CCNC: 16 U/L (ref 7–52)
ANION GAP SERPL CALCULATED.3IONS-SCNC: 8 MMOL/L
AST SERPL W P-5'-P-CCNC: 18 U/L (ref 13–39)
BACTERIA UR QL AUTO: ABNORMAL /HPF
BASOPHILS # BLD AUTO: 0.05 THOUSANDS/ÂΜL (ref 0–0.1)
BASOPHILS NFR BLD AUTO: 0 % (ref 0–1)
BILIRUB SERPL-MCNC: 0.33 MG/DL (ref 0.2–1)
BILIRUB UR QL STRIP: NEGATIVE
BNP SERPL-MCNC: 30 PG/ML (ref 0–100)
BUN SERPL-MCNC: 11 MG/DL (ref 5–25)
CALCIUM SERPL-MCNC: 9.5 MG/DL (ref 8.4–10.2)
CARDIAC TROPONIN I PNL SERPL HS: 3 NG/L
CHLORIDE SERPL-SCNC: 103 MMOL/L (ref 96–108)
CLARITY UR: ABNORMAL
CO2 SERPL-SCNC: 24 MMOL/L (ref 21–32)
COLOR UR: YELLOW
CREAT SERPL-MCNC: 0.48 MG/DL (ref 0.6–1.3)
CREAT UR-MCNC: 65.2 MG/DL
D DIMER PPP FEU-MCNC: 0.81 UG/ML FEU
EOSINOPHIL # BLD AUTO: 0.12 THOUSAND/ÂΜL (ref 0–0.61)
EOSINOPHIL NFR BLD AUTO: 1 % (ref 0–6)
ERYTHROCYTE [DISTWIDTH] IN BLOOD BY AUTOMATED COUNT: 13.3 % (ref 11.6–15.1)
GFR SERPL CREATININE-BSD FRML MDRD: 133 ML/MIN/1.73SQ M
GLUCOSE SERPL-MCNC: 69 MG/DL (ref 65–140)
GLUCOSE UR STRIP-MCNC: NEGATIVE MG/DL
HCT VFR BLD AUTO: 34.6 % (ref 34.8–46.1)
HGB BLD-MCNC: 11.9 G/DL (ref 11.5–15.4)
HGB UR QL STRIP.AUTO: NEGATIVE
IMM GRANULOCYTES # BLD AUTO: 0.2 THOUSAND/UL (ref 0–0.2)
IMM GRANULOCYTES NFR BLD AUTO: 1 % (ref 0–2)
KETONES UR STRIP-MCNC: ABNORMAL MG/DL
LEUKOCYTE ESTERASE UR QL STRIP: ABNORMAL
LYMPHOCYTES # BLD AUTO: 2.18 THOUSANDS/ÂΜL (ref 0.6–4.47)
LYMPHOCYTES NFR BLD AUTO: 15 % (ref 14–44)
MCH RBC QN AUTO: 31.6 PG (ref 26.8–34.3)
MCHC RBC AUTO-ENTMCNC: 34.4 G/DL (ref 31.4–37.4)
MCV RBC AUTO: 92 FL (ref 82–98)
MONOCYTES # BLD AUTO: 0.82 THOUSAND/ÂΜL (ref 0.17–1.22)
MONOCYTES NFR BLD AUTO: 6 % (ref 4–12)
MUCOUS THREADS UR QL AUTO: ABNORMAL
NEUTROPHILS # BLD AUTO: 11.4 THOUSANDS/ÂΜL (ref 1.85–7.62)
NEUTS SEG NFR BLD AUTO: 77 % (ref 43–75)
NITRITE UR QL STRIP: NEGATIVE
NON-SQ EPI CELLS URNS QL MICRO: ABNORMAL /HPF
NRBC BLD AUTO-RTO: 0 /100 WBCS
PH UR STRIP.AUTO: 6.5 [PH]
PLATELET # BLD AUTO: 297 THOUSANDS/UL (ref 149–390)
PMV BLD AUTO: 9.7 FL (ref 8.9–12.7)
POTASSIUM SERPL-SCNC: 3.9 MMOL/L (ref 3.5–5.3)
PROT SERPL-MCNC: 7.3 G/DL (ref 6.4–8.4)
PROT UR STRIP-MCNC: NEGATIVE MG/DL
PROT UR-MCNC: 15 MG/DL
PROT/CREAT UR: 0.23 MG/G{CREAT} (ref 0–0.1)
RBC # BLD AUTO: 3.77 MILLION/UL (ref 3.81–5.12)
RBC #/AREA URNS AUTO: ABNORMAL /HPF
SODIUM SERPL-SCNC: 135 MMOL/L (ref 135–147)
SP GR UR STRIP.AUTO: 1.02 (ref 1–1.03)
UROBILINOGEN UR STRIP-ACNC: <2 MG/DL
WBC # BLD AUTO: 14.77 THOUSAND/UL (ref 4.31–10.16)
WBC #/AREA URNS AUTO: ABNORMAL /HPF

## 2024-02-10 PROCEDURE — 36415 COLL VENOUS BLD VENIPUNCTURE: CPT | Performed by: EMERGENCY MEDICINE

## 2024-02-10 PROCEDURE — 83880 ASSAY OF NATRIURETIC PEPTIDE: CPT | Performed by: EMERGENCY MEDICINE

## 2024-02-10 PROCEDURE — 99285 EMERGENCY DEPT VISIT HI MDM: CPT | Performed by: EMERGENCY MEDICINE

## 2024-02-10 PROCEDURE — 81001 URINALYSIS AUTO W/SCOPE: CPT | Performed by: EMERGENCY MEDICINE

## 2024-02-10 PROCEDURE — 84484 ASSAY OF TROPONIN QUANT: CPT | Performed by: EMERGENCY MEDICINE

## 2024-02-10 PROCEDURE — 84156 ASSAY OF PROTEIN URINE: CPT | Performed by: EMERGENCY MEDICINE

## 2024-02-10 PROCEDURE — 85379 FIBRIN DEGRADATION QUANT: CPT | Performed by: EMERGENCY MEDICINE

## 2024-02-10 PROCEDURE — G1004 CDSM NDSC: HCPCS

## 2024-02-10 PROCEDURE — 93005 ELECTROCARDIOGRAM TRACING: CPT

## 2024-02-10 PROCEDURE — 71275 CT ANGIOGRAPHY CHEST: CPT

## 2024-02-10 PROCEDURE — 87086 URINE CULTURE/COLONY COUNT: CPT | Performed by: EMERGENCY MEDICINE

## 2024-02-10 PROCEDURE — 80053 COMPREHEN METABOLIC PANEL: CPT | Performed by: EMERGENCY MEDICINE

## 2024-02-10 PROCEDURE — 85025 COMPLETE CBC W/AUTO DIFF WBC: CPT | Performed by: EMERGENCY MEDICINE

## 2024-02-10 PROCEDURE — 99243 OFF/OP CNSLTJ NEW/EST LOW 30: CPT | Performed by: OBSTETRICS & GYNECOLOGY

## 2024-02-10 PROCEDURE — 82570 ASSAY OF URINE CREATININE: CPT | Performed by: EMERGENCY MEDICINE

## 2024-02-10 PROCEDURE — 76815 OB US LIMITED FETUS(S): CPT | Performed by: EMERGENCY MEDICINE

## 2024-02-10 PROCEDURE — 99284 EMERGENCY DEPT VISIT MOD MDM: CPT

## 2024-02-10 PROCEDURE — 59025 FETAL NON-STRESS TEST: CPT | Performed by: OBSTETRICS & GYNECOLOGY

## 2024-02-10 RX ORDER — CEPHALEXIN 250 MG/1
500 CAPSULE ORAL ONCE
Status: COMPLETED | OUTPATIENT
Start: 2024-02-10 | End: 2024-02-10

## 2024-02-10 RX ORDER — CEPHALEXIN 500 MG/1
500 CAPSULE ORAL 3 TIMES DAILY
Qty: 12 CAPSULE | Refills: 0 | Status: SHIPPED | OUTPATIENT
Start: 2024-02-10 | End: 2024-02-14

## 2024-02-10 RX ADMIN — CEPHALEXIN 500 MG: 250 CAPSULE ORAL at 17:15

## 2024-02-10 RX ADMIN — IOHEXOL 55 ML: 350 INJECTION, SOLUTION INTRAVENOUS at 15:49

## 2024-02-10 NOTE — ASSESSMENT & PLAN NOTE
BP elevated on arrival 146/78 and had one other elevated BP earlier in the pregnancy, now meets criteria for GHTN -> new diagnosis not discussed with patient in ED, needs to be discussed in the office  Preeclampsia labs wnl, P:C pending

## 2024-02-10 NOTE — ED ATTENDING ATTESTATION
2/10/2024  I, Julieta Blackwood MD, saw and evaluated the patient. I have discussed the patient with the resident/non-physician practitioner and agree with the resident's/non-physician practitioner's findings, Plan of Care, and MDM as documented in the resident's/non-physician practitioner's note, except where noted. All available labs and Radiology studies were reviewed.  I was present for key portions of any procedure(s) performed by the resident/non-physician practitioner and I was immediately available to provide assistance.       At this point I agree with the current assessment done in the Emergency Department.  I have conducted an independent evaluation of this patient a history and physical is as follows:    28-year-old  female currently 34w5d pregnant with history of POTS.  Patient presents for evaluation of decreased oxygen level on her home pulse ox.  The patient states that over the last several days she occasionally feels like she cannot catch her breath, particularly with exertion.  Today while walking around [employer] she felt out of breath and checked her oxygen level on her home pulse ox.  Oxygen level read as low as 86%.  She denies any chest pain.  Experiences occasional palpitations that she associates with her POTS. No dizziness.  Denies cough, fevers, or chills.  She has been making an effort to remain well-hydrated.  No abdominal pain, vaginal bleeding, or loss of fluid and reports active fetal movement.    Physical Exam  Vitals and nursing note reviewed.   Constitutional:       General: She is not in acute distress.     Appearance: She is well-developed. She is not ill-appearing, toxic-appearing or diaphoretic.   HENT:      Head: Normocephalic and atraumatic.      Right Ear: External ear normal.      Left Ear: External ear normal.      Nose: Nose normal.   Eyes:      Conjunctiva/sclera: Conjunctivae normal.   Cardiovascular:      Rate and Rhythm: Regular rhythm. Tachycardia present.       Pulses: Normal pulses.      Heart sounds: Normal heart sounds. No murmur heard.     No friction rub. No gallop.      Comments:  bpm  Pulmonary:      Effort: Pulmonary effort is normal. No respiratory distress.      Breath sounds: Normal breath sounds. No wheezing or rales.   Abdominal:      General: Bowel sounds are normal. There is no distension.      Palpations: Abdomen is soft.      Tenderness: There is no abdominal tenderness. There is no guarding.      Comments: Appropriately gravid   Musculoskeletal:         General: No deformity. Normal range of motion.      Cervical back: Normal range of motion and neck supple.      Comments: Trace edema to the bilateral ankles. No calf tenderness.    Skin:     General: Skin is warm and dry.   Neurological:      Mental Status: She is alert and oriented to person, place, and time.      Motor: No abnormal muscle tone.   Psychiatric:         Mood and Affect: Mood normal.           ED Course  ED Course as of 02/10/24 1722   Sat Feb 10, 2024   1454 OB GYN was already at bedside- they will be sending a nurse down to perform an NST. Patient denies any Ob GYN-related complaints- no abdominal pain, vaginal bleeding, LOF, or decreased fetal movement.    1457 I personally interpreted the patient's EKG which reveals normal rate, normal sinus rhythm, normal axis, normal intervals, no ischemic changes.  Patient is satting 100% on room air here but reports pulse ox of 86% while walking around [employer] prior to arrival.  Her home pulse ox is correlating with our pulse ox here.  Discussed with patient recommendation to rule out PE.  I anticipate that her D-dimer will be elevated due to pregnancy-if this is the case we will proceed with CTA of the chest to rule out PE.  Risk versus benefits of CTA of the chest were discussed with the patient and her  at bedside.  They are agreeable to moving forward with this imaging if indicated based on laboratory results.  Patient has trace  edema to the bilateral ankles, no calf swelling or tenderness.  She denies headache or vision changes.  No chest pain.  Initial blood pressure on arrival was 146/78, with subsequent blood pressures of 134/74 and 121/63.  Will continue to trend every 15 minutes but low suspicion for preeclampsia as the cause of her symptoms at this time.   1512 Case discussed with Dr. Gorman, OB GYN resident on call who came down to evaluate the patient. Given 1 elevated BP recommends obtaining a clean catch urine with protein creatinine ratio. OB will perform NST. If NST is normal and there are no severe range blood pressures then the patient will be cleared from an OB perspective.    1524 BNP WNL   1527 D-dimer 0.81. Will proceed with CTA of the chest.      1621 CTA of the chest without evidence of PE. Lungs clear. SPO2 remained in the high 90s throughout the patient's stay in the ED. UA and protein/Cr ratio collected and pending. OB will follow-up on the results. No additional elevated blood pressures. Patient is stable for discharge home with return precautions discussed.    1628 NST unremarkable.    1657 UA with innumerable bacteria, 20-30 white blood cells.  Also has innumerable red blood cells.  Patient denies vaginal bleeding or visible blood in her urine.  No dysuria or frequency.  Will treat for asymptomatic bacteriuria with course of Keflex.  I recommend follow-up with OB/GYN for repeat UA to ensure resolution of microscopic hematuria as well as bacteriuria.  Protein creatinine ratio is less than 0.3.  Return precautions discussed.         Critical Care Time  Procedures

## 2024-02-10 NOTE — TELEPHONE ENCOUNTER
Reason for Disposition  • Patient sounds very sick or weak to the triager    Protocols used: Dizziness - Lightheadedness-ADULT-AH

## 2024-02-10 NOTE — ASSESSMENT & PLAN NOTE
Defer workup to ED provider who is ordering a CTA to rule out PE  Okay for discharge home from an OB standpoint

## 2024-02-10 NOTE — TELEPHONE ENCOUNTER
"Answer Assessment - Initial Assessment Questions  1. DESCRIPTION: \"Describe your dizziness.\"      Dizziness and sat down before she thought she may pass out   2. LIGHTHEADED: \"Do you feel lightheaded?\" (e.g., somewhat faint, woozy, weak upon standing)      Yes   3. VERTIGO: \"Do you feel like either you or the room is spinning or tilting?\" (i.e. vertigo)      no  4. SEVERITY: \"How bad is it?\"  \"Do you feel like you are going to faint?\" \"Can you stand and walk?\"    - MILD: Feels slightly dizzy, but walking normally.    - MODERATE: Feels very unsteady when walking, but not falling; interferes with normal activities (e.g., school, work) .    - SEVERE: Unable to walk without falling, or requires assistance to walk without falling; feels like passing out now.       Moderate   5. ONSET:  \"When did the dizziness begin?\"      Over a while   6. AGGRAVATING FACTORS: \"Does anything make it worse?\" (e.g., standing, change in head position)      Ambulating   7. HEART RATE: \"Can you tell me your heart rate?\" \"How many beats in 15 seconds?\"  (Note: not all patients can do this)        Heart rate at rest 110 and when up walking is 130  8. CAUSE: \"What do you think is causing the dizziness?\"      Unsure but does have POTS  9. RECURRENT SYMPTOM: \"Have you had dizziness before?\" If Yes, ask: \"When was the last time?\" \"What happened that time?\"      Yes   10. OTHER SYMPTOMS: \"Do you have any other symptoms?\" (e.g., fever, chest pain, vomiting, diarrhea, bleeding)        [place] of heaviness over body  11. PREGNANCY: \"Is there any chance you are pregnant?\" \"When was your last menstrual period?\"        34weeks/5 days       Normal oxygen is 98% but with pregnancy has been 96-97 is new good; Has been at 94% for last few weeks.     Today when ambulating around house and was 85% and HR was 130. She then sat down and she recovered to 94% and  after 3 min    Protocols used: Dizziness - Lightheadedness-ADULT-AH    "

## 2024-02-10 NOTE — CONSULTS
Consult - OB/GYN   Elizabeth Bear 28 y.o. female MRN: [id number]  Unit/Bed#: ED-04 Encounter: [id number]    Assessment:   28 y.o.  at 34w5d who presents to the ED with SOB, dizziness and hypoxia noted on home pulse ox.    Plan:   Gestational hypertension, third trimester  Assessment & Plan  BP elevated on arrival 146/78 and had one other elevated BP earlier in the pregnancy, now meets criteria for GHTN -> new diagnosis not discussed with patient in ED, needs to be discussed in the office  Preeclampsia labs wnl, P:C pending    34 weeks gestation of pregnancy  Assessment & Plan  Up to date on prenatal care  Denies contractions, LOF and vaginal bleeding. Has felt good fetal movement.  NST reactive, toco acontractile  Defer workup of SOB to ED provider, okay for discharge home from an OB standpoint    * Shortness of breath during pregnancy  Assessment & Plan  Defer workup to ED provider who is ordering a CTA to rule out PE  Okay for discharge home from an OB standpoint        HPI:  28-year-old female with history of POTS,  at 34w5d followed by [facility] presents to the ED with shortness of breath and hypoxia onset 1 to 2 hours prior to arrival. Patient reports she was shopping at [employer], when she fell acutely short of breath. She has a pulse ox and it was reading 86% on room air while ambulating, and 91% at rest. Patient denies prior history of DVT/PE, lung conditions such as COPD, smoking or ILD. She reports her shortness of breath has been worsening throughout her pregnancy, and was advised to use her pulse ox machine by her OB/GYN. She denies fever, chills, hemoptysis, neck pain/stiffness, cough, congestion, sore throat, chest pain, back pain, abdominal pain, nausea, vomiting, diarrhea, bloody/tarry stools, unilateral leg swelling, leg pain, urinary symptoms. She denies any OB complaints including decreased fetal movement, contractions, vaginal bleeding, LOF. Patient has prior history of miscarriage,  but this pregnancy has been uncomplicated.     Active Problems:  Patient Active Problem List   Diagnosis    Allergic rhinitis    Asthma    PTSD (post-traumatic stress disorder)    Endometriosis    Peripheral polyneuropathy    POTS (postural orthostatic tachycardia syndrome)    Chronic migraine without aura without status migrainosus, not intractable    Gross hematuria    Chronic neck pain    Vaginal bleeding in pregnancy, first trimester    Abnormal glucose affecting pregnancy    Obesity affecting pregnancy in third trimester    Rh negative state in antepartum period, third trimester    34 weeks gestation of pregnancy    ASCUS with positive high risk HPV cervical    Candida infection of flexural skin    Vulvovaginal candidiasis    Back pain affecting pregnancy in third trimester    Elevated BP without diagnosis of hypertension    Supervision of normal pregnancy    Nipple dermatitis    Pelvic cramping    Fall at home, initial encounter    Breech presentation    De Quervain's tenosynovitis, right    Shortness of breath during pregnancy    Gestational hypertension, third trimester       PMH:  Past Medical History:   Diagnosis Date    Abnormal Pap smear of cervix 01/26/2023    ASCUS HPV other    Allergic rhinitis     Anxiety     Asthma     Cyst of right ovary 03/08/2022    Depression     well managed with therapy    Ectopic pregnancy 03/08/2022    Questional cyst vs ectopic    Endometriosis     Intractable migraine with status migrainosus 10/08/2022    Miscarriage 02/2022    POTS (postural orthostatic tachycardia syndrome)     PTSD (post-traumatic stress disorder)     S/P D&C (status post dilation and curettage) 03/13/2022    S/P ovarian cystectomy 03/13/2022       PSH:  Past Surgical History:   Procedure Laterality Date    DILATION AND CURETTAGE OF UTERUS N/A 03/08/2022    Procedure: DILATATION AND CURETTAGE (D&C);  Surgeon: Serenity Livingston MD;  Location: [facility];  Service: Gynecology    LAPAROSCOPIC ENDOMETRIOSIS  FULGURATION  2021    [place] LAPS ABD PRTM&OMENTUM DX W/WO SPEC BR/WA SPX N/A 2022    Procedure: LAPAROSCOPY DIAGNOSTIC, right ovarian cystectomy;  Surgeon: Serenity Livingston MD;  Location: [facility];  Service: Gynecology       OB History  OB History    Para Term  AB Living   2       1     SAB IAB Ectopic Multiple Live Births   0   1          # Outcome Date GA Lbr Garcia/2nd Weight Sex Delivery Anes PTL Lv   2 Current            1 Ectopic 22     SAB         Birth Comments: question chemical?      Obstetric Comments   3/8/2022 Fluctuating beta HCG, resolved after ovarian cystectomy of hemorrhagic corpus luteal cyst and D+C         Meds:  No current facility-administered medications on file prior to encounter.     Current Outpatient Medications on File Prior to Encounter   Medication Sig Dispense Refill    albuterol (2.5 mg/3 mL) 0.083 % nebulizer solution Take 3 mL (2.5 mg total) by nebulization every 6 (six) hours as needed for wheezing or shortness of breath 90 mL 0    albuterol (ProAir HFA) 90 mcg/act inhaler Inhale 2 puffs every 6 (six) hours as needed for wheezing 8.5 g 0    aspirin 81 mg chewable tablet Chew 2 tablets (162 mg total) daily 180 tablet 3    Blood Glucose Monitoring Suppl (Contour Next Gen Monitor) w/Device KIT Per insurance formulary. GDM (Patient not taking: Reported on 2024) 1 kit 0    Contour Next Test test strip Test 4 times a day. GDM (Patient not taking: Reported on 2024) 100 strip 0    Elastic Bandages & Supports (Abdominal Binder/Elastic XL) MISC Use if needed (back pain) Pregnancy belly band 1 each 0    Elastic Bandages & Supports (Wrist Splint/Left Medium) MISC Use daily at bedtime 1 each 0    hydrocortisone 2.5 % cream Apply topically 2 (two) times a day Apply pea sized amount twice daily to nipple for 10-14 days 20 g 0    magnesium oxide (MAG-OX) 400 mg tablet Take 1 tablet (400 mg total) by mouth daily 90 tablet 1    Microlet Lancets MISC Use 4 a day.  GDM (Patient not taking: Reported on 1/19/2024) 100 each 0    nystatin (MYCOSTATIN) powder Apply topically 3 (three) times a day As needed 30 g 0    Prenatal Vit-Fe Fumarate-FA (PRENATAL PO) Take by mouth         Allergies:  No Known Allergies    Physical Exam:  /77   Pulse 97   Temp 97.9 °F (36.6 °C) (Oral)   Resp 16   LMP 06/12/2023 (Exact Date)   SpO2 99%       Physical Exam  GEN: The patient was alert and oriented x3, pleasant well-appearing female in no acute distress.   CV: Regular rate  PULM: Non-labored respirations  MSK: Normal gait  Skin: Warm, dry  Neuro: No focal deficits  Psych: Normal affect and judgement, cooperative      Sunita Gorman MD   OB/Gyn PGY-3  4:42 PM  02/10/24

## 2024-02-10 NOTE — TELEPHONE ENCOUNTER
"Regardin weeks and 5 days pregnant / dizzy /  ----- Message from Alejandra Cheung sent at 2/10/2024  1:28 PM EST -----  \"I  am 34  weeks and 5 days pregnant . Resting 90 to 95 for oxygen and 110 for resting . When ambulating 85 and pulse is @ 130\"    "

## 2024-02-10 NOTE — ASSESSMENT & PLAN NOTE
Up to date on prenatal care  Denies contractions, LOF and vaginal bleeding. Has felt good fetal movement.  NST reactive, toco acontractile  Defer workup of SOB to ED provider, okay for discharge home from an OB standpoint

## 2024-02-10 NOTE — DISCHARGE INSTRUCTIONS
Today you were seen in the emergency department for shortness of breath. Your workup included labs, CT imaging and ultrasound. I believe your symptoms to be the result of normal pregnancy versus faulty pulse ox reading. At this time there does not appear to be an emergent life threatening cause to explain your symptoms. You are stable for discharge home with outpatient follow up.     Your urine analysis showed signs of urine infection.  Your infections despite urinary symptoms are treated with antibiotics during pregnancy.  Will treat with Keflex to take 3 times a day for 4 days.     Please follow up with your OB/GYN in the next 2-3 days. Please review all results discussed today with your primary care provider.    Please return to the emergency department as soon as possible if you develop uncontrollable fevers (Temp >100.4), uncontrollable pain, vomiting, chest pain, trouble breathing, contractions, vaginal bleeding, leakage or loss of fluid, decreased fetal movement or any other concerning symptoms.     Thank you for choosing [facility] for your care.

## 2024-02-10 NOTE — PROGRESS NOTES
NST done at the bedside. Baseline  with accel's and no decel's noted. No contractions noted on toco. Patient has no complaints of abdominal pain nor any leakage or fluid from the vagina. Patient reports fetal movements.

## 2024-02-10 NOTE — ED PROVIDER NOTES
History  Chief Complaint   Patient presents with    Decreased Oxygen Level     Pt reports low oxygen levels when ambulating at home around 86%, resting 91%, HR in 130s at home, hx POTS, states currently denies SOB but states dizziness/heaviness, 34 weeks pregnant     28-year-old female with history of POTS,  at 34w5d followed by [facility] presents to the ED with complaints of shortness of breath and hypoxia onset 1 to 2 hours prior to arrival.  Patient reports she was shopping at [employer], when she fell acutely short of breath.  She has a pulse ox and it was reading 86% on room air while ambulating, and 91% at rest.  Patient denies prior history of DVT/PE, lung conditions such as COPD, smoking or ILD.  She reports her shortness of breath has been worsening throughout her pregnancy, and was advised to use her pulse ox machine by her OB/GYN.  She denies fever, chills, hemoptysis, neck pain/stiffness, cough, congestion, sore throat, chest pain, back pain, abdominal pain, nausea, vomiting, diarrhea, bloody/tarry stools, unilateral leg swelling, leg pain, urinary symptoms.  She denies any OB complaints including decreased fetal movement, contractions, vaginal bleeding, LOF.  Patient has prior history of miscarriage, but this pregnancy has been uncomplicated.        Prior to Admission Medications   Prescriptions Last Dose Informant Patient Reported? Taking?   Blood Glucose Monitoring Suppl (Contour Next Gen Monitor) w/Device KIT   No No   Sig: Per insurance formulary. GDM   Patient not taking: Reported on 2024   Contour Next Test test strip   No No   Sig: Test 4 times a day. GDM   Patient not taking: Reported on 2024   Elastic Bandages & Supports (Abdominal Binder/Elastic XL) MISC  Self No No   Sig: Use if needed (back pain) Pregnancy belly band   Elastic Bandages & Supports (Wrist Splint/Left Medium) MISC  Self No No   Sig: Use daily at bedtime   Microlet Lancets MISC   No No   Sig: Use 4 a day. GDM   Patient  not taking: Reported on 1/19/2024   Prenatal Vit-Fe Fumarate-FA (PRENATAL PO)  Self Yes No   Sig: Take by mouth   albuterol (2.5 mg/3 mL) 0.083 % nebulizer solution  Self No No   Sig: Take 3 mL (2.5 mg total) by nebulization every 6 (six) hours as needed for wheezing or shortness of breath   albuterol (ProAir HFA) 90 mcg/act inhaler  Self No No   Sig: Inhale 2 puffs every 6 (six) hours as needed for wheezing   aspirin 81 mg chewable tablet  Self No No   Sig: Chew 2 tablets (162 mg total) daily   hydrocortisone 2.5 % cream   No No   Sig: Apply topically 2 (two) times a day Apply pea sized amount twice daily to nipple for 10-14 days   magnesium oxide (MAG-OX) 400 mg tablet  Self No No   Sig: Take 1 tablet (400 mg total) by mouth daily   nystatin (MYCOSTATIN) powder   No No   Sig: Apply topically 3 (three) times a day As needed      Facility-Administered Medications: None       Past Medical History:   Diagnosis Date    Abnormal Pap smear of cervix 01/26/2023    ASCUS HPV other    Allergic rhinitis     Anxiety     Asthma     Cyst of right ovary 03/08/2022    Depression     well managed with therapy    Ectopic pregnancy 03/08/2022    Questional cyst vs ectopic    Endometriosis     Intractable migraine with status migrainosus 10/08/2022    Miscarriage 02/2022    POTS (postural orthostatic tachycardia syndrome)     PTSD (post-traumatic stress disorder)     S/P D&C (status post dilation and curettage) 03/13/2022    S/P ovarian cystectomy 03/13/2022       Past Surgical History:   Procedure Laterality Date    DILATION AND CURETTAGE OF UTERUS N/A 03/08/2022    Procedure: DILATATION AND CURETTAGE (D&C);  Surgeon: Serenity Livingston MD;  Location: [facility];  Service: Gynecology    LAPAROSCOPIC ENDOMETRIOSIS FULGURATION  03/03/2021    FL LAPS ABD PRTM&OMENTUM DX W/WO SPEC BR/WA SPX N/A 03/08/2022    Procedure: LAPAROSCOPY DIAGNOSTIC, right ovarian cystectomy;  Surgeon: Serenity Livingston MD;  Location: [facility];  Service: Gynecology        Family History   Problem Relation Age of Onset    Other Mother         hx od traumatic MVA-disabled now and partially paralized    Leukemia Maternal Grandmother 66    Stroke Maternal Grandmother     Heart attack Maternal Grandmother     No Known Problems Half-Sister     No Known Problems Half-Sister      I have reviewed and agree with the history as documented.    E-Cigarette/Vaping    E-Cigarette Use Never User      E-Cigarette/Vaping Substances    Nicotine No     THC No     CBD No     Flavoring No     Other No     Unknown No      Social History     Tobacco Use    Smoking status: Never    Smokeless tobacco: Never   Vaping Use    Vaping status: Never Used   Substance Use Topics    Alcohol use: Not Currently    Drug use: Never        Review of Systems   Constitutional:  Positive for fatigue. Negative for chills and fever.   HENT:  Negative for congestion and sore throat.    Eyes:  Negative for photophobia, pain, redness and visual disturbance.   Respiratory:  Positive for shortness of breath. Negative for cough and chest tightness.    Cardiovascular:  Negative for chest pain and palpitations.   Gastrointestinal:  Negative for abdominal pain, constipation, diarrhea, nausea and vomiting.   Genitourinary:  Negative for difficulty urinating, dysuria, flank pain, frequency, hematuria, pelvic pain, urgency, vaginal bleeding, vaginal discharge and vaginal pain.   Musculoskeletal:  Negative for arthralgias, back pain, neck pain and neck stiffness.   Skin:  Negative for color change and rash.   Neurological:  Negative for dizziness, seizures, syncope, light-headedness, numbness and headaches.   All other systems reviewed and are negative.      Physical Exam  ED Triage Vitals   Temperature Pulse Respirations Blood Pressure SpO2   02/10/24 1426 02/10/24 1425 02/10/24 1425 02/10/24 1425 02/10/24 1425   97.9 °F (36.6 °C) 104 18 146/78 100 %      Temp Source Heart Rate Source Patient Position - Orthostatic VS BP Location  FiO2 (%)   02/10/24 1426 02/10/24 1425 02/10/24 1425 02/10/24 1425 --   Oral Monitor Sitting Right arm       Pain Score       02/10/24 1425       No Pain             Orthostatic Vital Signs  Vitals:    02/10/24 1530 02/10/24 1615 02/10/24 1630 02/10/24 1700   BP: 109/62 123/80 120/77 126/73   Pulse: 96 97 97 97   Patient Position - Orthostatic VS:           Physical Exam  Vitals and nursing note reviewed.   Constitutional:       General: She is in acute distress.      Appearance: She is well-developed. She is not toxic-appearing.   HENT:      Head: Normocephalic and atraumatic.      Right Ear: External ear normal.      Left Ear: External ear normal.      Nose: Nose normal. No congestion.      Mouth/Throat:      Mouth: Mucous membranes are moist.      Pharynx: Oropharynx is clear. No oropharyngeal exudate or posterior oropharyngeal erythema.   Eyes:      Extraocular Movements: Extraocular movements intact.      Conjunctiva/sclera: Conjunctivae normal.      Pupils: Pupils are equal, round, and reactive to light.   Cardiovascular:      Rate and Rhythm: Regular rhythm. Tachycardia present.      Pulses: Normal pulses.      Heart sounds: Normal heart sounds. No murmur heard.  Pulmonary:      Effort: Pulmonary effort is normal. No respiratory distress.      Breath sounds: Normal breath sounds. No stridor. No wheezing, rhonchi or rales.   Abdominal:      General: Bowel sounds are normal.      Tenderness: There is no abdominal tenderness. There is no right CVA tenderness, left CVA tenderness, guarding or rebound.      Comments: Gravid uterus   Musculoskeletal:         General: No swelling, tenderness or deformity.      Cervical back: Normal range of motion and neck supple. No rigidity or tenderness.      Right lower leg: No edema.      Left lower leg: No edema.   Lymphadenopathy:      Cervical: No cervical adenopathy.   Skin:     General: Skin is warm and dry.      Capillary Refill: Capillary refill takes less than 2  seconds.      Coloration: Skin is not jaundiced or pale.      Findings: No bruising, erythema, lesion or rash.   Neurological:      General: No focal deficit present.      Mental Status: She is alert and oriented to person, place, and time. Mental status is at baseline.      GCS: GCS eye subscore is 4. GCS verbal subscore is 5. GCS motor subscore is 6.      Cranial Nerves: Cranial nerves 2-12 are intact. No cranial nerve deficit, dysarthria or facial asymmetry.      Sensory: Sensation is intact. No sensory deficit.      Motor: Motor function is intact. No weakness.      Coordination: Coordination is intact. Coordination normal.      Gait: Gait is intact.   Psychiatric:         Mood and Affect: Mood normal.         Behavior: Behavior normal.         Thought Content: Thought content normal.         ED Medications  Medications   iohexol (OMNIPAQUE) 350 MG/ML injection (MULTI-DOSE) 55 mL (55 mL Intravenous Given 2/10/24 1549)   cephalexin (KEFLEX) capsule 500 mg (500 mg Oral Given 2/10/24 1715)       Diagnostic Studies  Results Reviewed       Procedure Component Value Units Date/Time    Urine Microscopic [[id number]]  (Abnormal) Collected: 02/10/24 1610    Lab Status: Final result Specimen: Urine, Clean Catch Updated: 02/10/24 1655     RBC, UA Innumerable /hpf      WBC, UA 20-30 /hpf      Epithelial Cells Occasional /hpf      Bacteria, UA Innumerable /hpf      MUCUS THREADS Occasional     URINE COMMENT --    Protein / creatinine ratio, urine [[id number]]  (Abnormal) Collected: 02/10/24 1611    Lab Status: Final result Specimen: Urine, Clean Catch Updated: 02/10/24 1654     Creatinine, Ur 65.2 mg/dL      Protein Urine Random 15 mg/dL      Prot/Creat Ratio, Ur 0.23    UA w Reflex to Microscopic w Reflex to Culture [[id number]]  (Abnormal) Collected: 02/10/24 1610    Lab Status: Final result Specimen: Urine, Clean Catch Updated: 02/10/24 1628     Color, UA Yellow     Clarity, UA Turbid     Specific Gravity, UA 1.020     pH,  UA 6.5     Leukocytes, UA Small     Nitrite, UA Negative     Protein, UA Negative mg/dl      Glucose, UA Negative mg/dl      Ketones, UA 20 (1+) mg/dl      Urobilinogen, UA <2.0 mg/dl      Bilirubin, UA Negative     Occult Blood, UA Negative     URINE COMMENT --    Urine culture [[id number]] Collected: 02/10/24 1610    Lab Status: In process Specimen: Urine, Clean Catch Updated: 02/10/24 1628    D-dimer, quantitative [[id number]]  (Abnormal) Collected: 02/10/24 1446    Lab Status: Final result Specimen: Blood from Arm, Right Updated: 02/10/24 1527     D-Dimer, Quant 0.81 ug/ml FEU     B-Type Natriuretic Peptide(BNP) [[id number]]  (Normal) Collected: 02/10/24 1446    Lab Status: Final result Specimen: Blood from Arm, Right Updated: 02/10/24 1521     BNP 30 pg/mL     HS Troponin 0hr (reflex protocol) [[id number]]  (Normal) Collected: 02/10/24 1446    Lab Status: Final result Specimen: Blood from Arm, Right Updated: 02/10/24 1513     hs TnI 0hr 3 ng/L     Comprehensive metabolic panel [[id number]]  (Abnormal) Collected: 02/10/24 1446    Lab Status: Final result Specimen: Blood from Arm, Right Updated: 02/10/24 1509     Sodium 135 mmol/L      Potassium 3.9 mmol/L      Chloride 103 mmol/L      CO2 24 mmol/L      ANION GAP 8 mmol/L      BUN 11 mg/dL      Creatinine 0.48 mg/dL      Glucose 69 mg/dL      Calcium 9.5 mg/dL      AST 18 U/L      ALT 16 U/L      Alkaline Phosphatase 105 U/L      Total Protein 7.3 g/dL      Albumin 4.0 g/dL      Total Bilirubin 0.33 mg/dL      eGFR 133 ml/min/1.73sq m     Narrative:      National Kid[employer] Foundation guidelines for Chronic Kidney Disease (CKD):     Stage 1 with normal or high GFR (GFR > 90 mL/min/1.73 square meters)    Stage 2 Mild CKD (GFR = 60-89 mL/min/1.73 square meters)    Stage 3A Moderate CKD (GFR = 45-59 mL/min/1.73 square meters)    Stage 3B Moderate CKD (GFR = 30-44 mL/min/1.73 square meters)    Stage 4 Severe CKD (GFR = 15-29 mL/min/1.73 square meters)    Stage 5 End  Stage CKD (GFR <15 mL/min/1.73 square meters)  Note: GFR calculation is accurate only with a steady state creatinine    CBC and differential [[id number]]  (Abnormal) Collected: 02/10/24 1446    Lab Status: Final result Specimen: Blood from Arm, Right Updated: 02/10/24 1457     WBC 14.77 Thousand/uL      RBC 3.77 Million/uL      Hemoglobin 11.9 g/dL      Hematocrit 34.6 %      MCV 92 fL      MCH 31.6 pg      MCHC 34.4 g/dL      RDW 13.3 %      MPV 9.7 fL      Platelets 297 Thousands/uL      nRBC 0 /100 WBCs      Neutrophils Relative 77 %      Immat GRANS % 1 %      Lymphocytes Relative 15 %      Monocytes Relative 6 %      Eosinophils Relative 1 %      Basophils Relative 0 %      Neutrophils Absolute 11.40 Thousands/µL      Immature Grans Absolute 0.20 Thousand/uL      Lymphocytes Absolute 2.18 Thousands/µL      Monocytes Absolute 0.82 Thousand/µL      Eosinophils Absolute 0.12 Thousand/µL      Basophils Absolute 0.05 Thousands/µL                    CTA ED chest PE Study   Final Result by Miladis Daniel MD (02/10 1615)      No pulmonary embolus.      Lungs clear.      Small hiatal hernia.                           Workstation performed: [id number]               Procedures  ECG 12 Lead Documentation Only    Date/Time: 2/10/2024 2:53 PM    Performed by: Leland Martínez DO  Authorized by: Leland Martínez DO    ECG reviewed by me, the ED Provider: yes    Patient location:  ED  Previous ECG:     Previous ECG:  Compared to current    Comparison ECG info:  December 17, 2023    Similarity:  No change  Interpretation:     Interpretation: normal    Rate:     ECG rate:  94    ECG rate assessment: normal    Rhythm:     Rhythm: sinus rhythm    Ectopy:     Ectopy: none    QRS:     QRS axis:  Normal  Conduction:     Conduction: normal    ST segments:     ST segments:  Normal  T waves:     T waves: normal    POC Pelvic US    Date/Time: 2/10/2024 3:15 PM    Performed by: Leland Martínez DO  Authorized by: Leland Martínez DO    Patient  location:  ED  Procedure details:     Exam Type:  Diagnostic    Indications: evaluate for IUP      Assessment for: evaluate fetal viability      Technique:  Transabdominal obstetric (HCG+) exam    Views obtained: uterus (transverse and sagittal)      Image quality: limited diagnostic      Image availability:  Images available in PACS and video obtained  Uterine findings:     Intrauterine pregnancy: identified      Single gestation: identified      Fetal heart rate: identified      Fetal heart rate (bpm):  131  Other findings:     Free pelvic fluid: not identified      Free peritoneal fluid: not identified          ED Course  ED Course as of 02/10/24 1720   Sat Feb 10, 2024   1452 ECG 12 lead   1452 Independently interpreted patient's EKG showing normal sinus rhythm at 94.  Normal axis.  Normal intervals.  No acute ischemic changes.  No significant changes when compared to prior EKG done on December 17, 2023.   1509 WBC(!): 14.77  Nonspecific.    1513 CBC and differential(!)  Nonspecific leukocytosis of 14.8, hemoglobin stable at 11.9 no anemia.  Platelets WNL.   1514 Comprehensive metabolic panel(!)  No acute electrolyte abnormality.  Renal function within normal range of pregnancy.  Mild elevation in alk phos, but no significantly elevated LFTs.   1514 Patient's blood pressure improved to 112 systolic.   1514 hs TnI 0hr: 3  Patient without chest pain, and no ischemia on EKG.  Unlikely ACS will consult home for troponin.   1516 Blood Pressure: 112/62  Improved from 146/70, suspect initial elevation in BP due to anxiety from being in the emergency department however will check UA P:C ratio for further evaluation   1522 BNP: 30  WNL   1528 D-Dimer, Quant(!): 0.81  YEARS algorithm rules out PE (0.43% with symptomatic VTE during 3-month follow-up).  However given patient's report of significant hypoxia will evaluate further with CT PE study.   1604 Blood Pressure: 109/62   1605 Per OB RN note at 1553:    NST done at the  bedside. Baseline  with accel's and no decel's noted. No contractions noted on toco. Patient has no complaints of abdominal pain nor any leakage or fluid from the vagina. Patient reports fetal movements.    1622 CTA ED chest PE Study     No pulmonary embolus.     Lungs clear.     Small hiatal hernia.     1644 UA w Reflex to Microscopic w Reflex to Culture(!)  Small leukocyte Estrace, nitrite negative.  No hematuria.  No proteinuria. micro pending.   1654 Prot/Creat Ratio, Ur(!): 0.23  Less than 3, doubt preeclampsia   1710 Urine Microscopic(!)  Micro with a notable red blood cells, pyuria 20-30 and bacteria.  Will treat for asymptomatic bacteriuria given pregnancy.  First dose of Keflex given, prescription sent for 4-day course to take 3 times daily.   1717 Ambulatory pulse ox with saturation 100% throughout.  Patient without shortness of breath.  Reviewed strict return precautions with patient, wound care instructions at home. Advised outpatient follow-up with her OB/GYN.  Patient agreeable with plan.             HEART Risk Score      Flowsheet Row Most Recent Value   Heart Score Risk Calculator    History 0 Filed at: 02/10/2024 1524   ECG 0 Filed at: 02/10/2024 1524   Age 0 Filed at: 02/10/2024 1524   Risk Factors 0 Filed at: 02/10/2024 1524   Troponin 0 Filed at: 02/10/2024 1524   HEART Score 0 Filed at: 02/10/2024 1524                PERC Rule for PE      Flowsheet Row Most Recent Value   PERC Rule for PE    Age >=50 0 Filed at: 02/10/2024 1524   HR >=100 1 Filed at: 02/10/2024 1524   O2 Sat on room air < 95% 0 Filed at: 02/10/2024 1524   History of PE or DVT 0 Filed at: 02/10/2024 1524   Recent trauma or surgery 0 Filed at: 02/10/2024 1524   Hemoptysis 0 Filed at: 02/10/2024 1524   Exogenous estrogen 0 Filed at: 02/10/2024 1524   Unilateral leg swelling 0 Filed at: 02/10/2024 1524   PERC Rule for PE Results 1 Filed at: 02/10/2024 1524                SBIRT 22yo+      Flowsheet Row Most Recent Value  "  Initial Alcohol Screen: US AUDIT-C     1. How often do you have a drink containing alcohol? 0 Filed at: 02/10/2024 1456   2. How many drinks containing alcohol do you have on a typical day you are drinking?  0 Filed at: 02/10/2024 1456   3a. Male UNDER 65: How often do you have five or more drinks on one occasion? 0 Filed at: 02/10/2024 1456   3b. FEMALE Any Age, or MALE 65+: How often do you have 4 or more drinks on one occassion? 0 Filed at: 02/10/2024 1456   Audit-C Score 0 Filed at: 02/10/2024 1456   LISSETH: How many times in the past year have you...    Used an illegal drug or used a prescription medication for non-medical reasons? Never Filed at: 02/10/2024 1456            Wells' Criteria for PE      Flowsheet Row Most Recent Value   Wells' Criteria for PE    Clinical signs and symptoms of DVT 0 Filed at: 02/10/2024 1524   PE is primary diagnosis or equally likely 0 Filed at: 02/10/2024 1524   HR >100 1.5 Filed at: 02/10/2024 1524   Immobilization at least 3 days or Surgery in the previous 4 weeks 0 Filed at: 02/10/2024 1524   Previous, objectively diagnosed PE or DVT 0 Filed at: 02/10/2024 1524   Hemoptysis 0 Filed at: 02/10/2024 1524   Malignancy with treatment within 6 months or palliative 0 Filed at: 02/10/2024 1524   Wells' Criteria Total 1.5 Filed at: 02/10/2024 1524              Medical Decision Making  Patient with history as above presented to triage with CC of \" Patient presents with:  Decreased Oxygen Level: Pt reports low oxygen levels when ambulating at home around 86%, resting 91%, HR in 130s at home, hx POTS, states currently denies SOB but states dizziness/heaviness, 34 weeks pregnant   \"    Hx obtained from pt and spouse 28-year-old female with history of POTS, G2, P0 at 34 weeks and 5 days presenting with complaints of shortness of breath and reported hypoxia that onset today.  Oxygen saturation as low as 86% on personal pulse ox 1 hour prior to arrival.  Shortness of breath worsened with " exertion.  No prior history of DVT/PE, tachycardia initial however improved.  Patient satting greater than 99% on room air on arrival to the ED.  No longer short of breath.  Given significant reported hypoxia, shortness of breath and current pregnancy patient at increased risk for PE.  Vital signs stable, patient afebrile without symptoms of viral URI or cough.  No other obvious source of infection.  Differential diagnosis includes but is not limited to PE, pregnancy: Doubt sepsis, ACS, CHF, COPD, pneumothorax, pneumonia    CT PE study negative.  EKG normal sinus without ischemic changes.  Troponin WNL, BNP WNL.  No acute cardiopulmonary causes for patient's shortness of breath and reported hypoxia.  Pulse ox readings throughout ED stay greater than 95% on room air.  Ambulatory pulse ox greater than 95% on room air as well.  Patient's lab work significant for leukocytosis at 14.8, and UA positive for asymptomatic bacteriuria.  Will treat with Keflex 4-day course for UTI.  PC ratio otherwise unremarkable, doubt preeclampsia.  Will have patient follow-up with her OB/GYN as outpatient.  Reviewed strict return precautions with patient and she is agreeable with plan.    Patient was nontoxic appearing and stable. Exam as above. Ambulatory and Tolerating PO.     I have independently ordered, reviewed and interpreted the following: labs and/or imaging studies listed above, EKG above  Reviewed external records including notes, and prior labs/imaging results.     Consideration was given for admission, but the patient was stable for outpatient management.    Disposition: Discussed need for follow up with their primary doctor or specialist to review all results, including incidental findings as above. Patient discharged with explanation of ED workup and diagnosis, instructions on how to obtain outpatient follow up, care instructions at home, and strict return precautions if patient develops new or worsening symptoms. Patients  questions answered and agreeable with discharge plan.     See ED Course for further MDM.      PLEASE NOTE:  This encounter was completed utilizing the [employer] Direct Speech Voice Recognition Software. Grammatical errors, random word insertions, pronoun errors and incomplete sentences are occasional inherent consequences of the system due to software limitations, ambient noise and hardware issues.These may be missed by proof reading prior to affixing electronic signature. Any questions or concerns about the content, text or information contained within the body of this dictation should be directly addressed to the physician for clarification. Please do not hesitate to call me directly if you have any questions or concerns.      Amount and/or Complexity of Data Reviewed  Independent Historian: spouse  External Data Reviewed: labs and notes.  Labs: ordered. Decision-making details documented in ED Course.  Radiology: ordered. Decision-making details documented in ED Course.  ECG/medicine tests: ordered and independent interpretation performed. Decision-making details documented in ED Course.    Risk  Prescription drug management.          Disposition  Final diagnoses:   Shortness of breath   34 weeks gestation of pregnancy   Hiatal hernia   UTI (urinary tract infection)     Time reflects when diagnosis was documented in both MDM as applicable and the Disposition within this note       Time User Action Codes Description Comment    2/10/2024  3:25 PM Leland Martínez Add [R06.02] Shortness of breath     2/10/2024  3:25 PM Leland Martínez Add [Z3A.34] 34 weeks gestation of pregnancy     2/10/2024  4:22 PM Shea Martínezza Add [K44.9] Hiatal hernia     2/10/2024  4:58 PM Shea Martínezza Add [N39.0] UTI (urinary tract infection)           ED Disposition       ED Disposition   Discharge    Condition   Stable    Date/Time   Sat Feb 10, 2024 1655    Comment   Elizabeth Bear discharge to home/self care.                    Follow-up Information       Follow up With Specialties Details Why Contact Info Additional Information    Kaylene Leung MD Family Medicine  As needed [address]  [address]  [facility] [address] [phone]       [facility] Obstetric[facility] Gynecology [facility]  Please call tomorrow to schedule an appointment [address] [facility] [facility] [address]  [address]  [facility] [address] [phone] [facility]stetric[facility] & Gynecology [facility], [address], [place], [address] [phone]            Patient's Medications   Discharge Prescriptions    CEPHALEXIN (KEFLEX) 500 MG CAPSULE    Take 1 capsule (500 mg total) by mouth 3 (three) times a day for 4 days       Start Date: 2/10/2024 End Date: 2/14/2024       Order Dose: 500 mg       Quantity: 12 capsule    Refills: 0     No discharge procedures on file.    PDMP Review       None             ED Provider  Attending physically available and evaluated Elizabeth Bear. I managed the patient along with the ED Attending.    Electronically Signed by           Leland Martínez DO  02/10/24 6060

## 2024-02-11 LAB
ATRIAL RATE: 94 BPM
P AXIS: 34 DEGREES
PR INTERVAL: 144 MS
QRS AXIS: 51 DEGREES
QRSD INTERVAL: 86 MS
QT INTERVAL: 350 MS
QTC INTERVAL: 437 MS
T WAVE AXIS: 36 DEGREES
VENTRICULAR RATE: 94 BPM

## 2024-02-12 ENCOUNTER — TELEPHONE (OUTPATIENT)
Dept: OBGYN CLINIC | Facility: CLINIC | Age: 29
End: 2024-02-12

## 2024-02-13 LAB — BACTERIA UR CULT: NORMAL

## 2024-02-15 ENCOUNTER — TELEPHONE (OUTPATIENT)
Dept: OBGYN CLINIC | Facility: CLINIC | Age: 29
End: 2024-02-15

## 2024-02-19 ENCOUNTER — ROUTINE PRENATAL (OUTPATIENT)
Dept: OBGYN CLINIC | Facility: CLINIC | Age: 29
End: 2024-02-19
Payer: COMMERCIAL

## 2024-02-19 VITALS
DIASTOLIC BLOOD PRESSURE: 76 MMHG | HEART RATE: 109 BPM | SYSTOLIC BLOOD PRESSURE: 120 MMHG | BODY MASS INDEX: 40.31 KG/M2 | WEIGHT: 257.4 LBS

## 2024-02-19 DIAGNOSIS — Z3A.36 36 WEEKS GESTATION OF PREGNANCY: Primary | ICD-10-CM

## 2024-02-19 DIAGNOSIS — O13.3 GESTATIONAL HYPERTENSION, THIRD TRIMESTER: ICD-10-CM

## 2024-02-19 PROBLEM — O20.9 VAGINAL BLEEDING IN PREGNANCY, FIRST TRIMESTER: Status: RESOLVED | Noted: 2023-09-07 | Resolved: 2024-02-19

## 2024-02-19 LAB
SL AMB  POCT GLUCOSE, UA: NEGATIVE
SL AMB POCT URINE PROTEIN: NEGATIVE

## 2024-02-19 PROCEDURE — 81002 URINALYSIS NONAUTO W/O SCOPE: CPT | Performed by: STUDENT IN AN ORGANIZED HEALTH CARE EDUCATION/TRAINING PROGRAM

## 2024-02-19 PROCEDURE — PNV: Performed by: STUDENT IN AN ORGANIZED HEALTH CARE EDUCATION/TRAINING PROGRAM

## 2024-02-19 PROCEDURE — 87150 DNA/RNA AMPLIFIED PROBE: CPT | Performed by: STUDENT IN AN ORGANIZED HEALTH CARE EDUCATION/TRAINING PROGRAM

## 2024-02-19 NOTE — PROGRESS NOTES
Pt is here for routine ob visit   No concerns at this time  Urine neg/neg   No LOF,VB,Contractions  +FM   UTD flu/tdap/rhogam   Delivery Consent signed at previous visit   GBS collected today

## 2024-02-19 NOTE — PROGRESS NOTES
28 y.o.  at 36w0d, here for routine OB visit. Feeling well overall and without concerns. Good FM. Denies LOF, VB, consistent contractions.     Problem List Items Addressed This Visit          Cardiovascular and Mediastinum    Gestational hypertension, third trimester     Reviewed diagnosis and delivery timing today  - recommend 37 week IOL. Consent form signed            Other    36 weeks gestation of pregnancy - Primary     -precautions reviewed  -s/p Tdap/flu/Rhogam  -prepregnancy BMI 37 with goal weight gain 11-20#: TWG = 19#         Relevant Orders    POCT urine dip (Completed)    Strep B DNA probe, amplification

## 2024-02-19 NOTE — ASSESSMENT & PLAN NOTE
-precautions reviewed  -s/p Tdap/flu/Rhogam  -prepregnancy BMI 37 with goal weight gain 11-20#: TWG = 19#

## 2024-02-20 ENCOUNTER — TELEPHONE (OUTPATIENT)
Dept: OBGYN CLINIC | Facility: CLINIC | Age: 29
End: 2024-02-20

## 2024-02-20 NOTE — TELEPHONE ENCOUNTER
patients medical induction of labor , ghtn, scheduled for 8 pm on the 26th into the 27th . Pt is aware. Dr. Ott notified

## 2024-02-20 NOTE — TELEPHONE ENCOUNTER
----- Message from Janay Ott DO sent at 2/20/2024  8:50 AM EST -----  Procedure to be scheduled (IOL or CS): IOL    KWAME: Estimated Date of Delivery: 3/18/24     Indication for delivery: gHTN    Requested date (s) of delivery: 2/26/24 PM induction slot   If requested date is unavailable, is there a date by which the pt must be delivered?    Physician preference: Namrata on 2/27    If IOL, anticipated method: will need cervical ripening     If CS, with or without tubal: N/A

## 2024-02-21 LAB — GP B STREP DNA SPEC QL NAA+PROBE: POSITIVE

## 2024-02-26 ENCOUNTER — HOSPITAL ENCOUNTER (OUTPATIENT)
Dept: LABOR AND DELIVERY | Facility: HOSPITAL | Age: 29
Discharge: HOME | End: 2024-02-26
Payer: COMMERCIAL

## 2024-02-26 ENCOUNTER — HOSPITAL ENCOUNTER (INPATIENT)
Facility: HOSPITAL | Age: 29
LOS: 3 days | Discharge: HOME | End: 2024-02-29
Attending: OBSTETRICS & GYNECOLOGY | Admitting: OBSTETRICS & GYNECOLOGY
Payer: COMMERCIAL

## 2024-02-26 ENCOUNTER — ROUTINE PRENATAL (OUTPATIENT)
Dept: OBGYN CLINIC | Facility: CLINIC | Age: 29
End: 2024-02-26
Payer: COMMERCIAL

## 2024-02-26 VITALS
DIASTOLIC BLOOD PRESSURE: 78 MMHG | SYSTOLIC BLOOD PRESSURE: 126 MMHG | WEIGHT: 258 LBS | HEART RATE: 101 BPM | BODY MASS INDEX: 40.41 KG/M2

## 2024-02-26 DIAGNOSIS — Z3A.37 37 WEEKS GESTATION OF PREGNANCY: Primary | ICD-10-CM

## 2024-02-26 DIAGNOSIS — Z34.03 PRENATAL CARE, FIRST PREGNANCY, THIRD TRIMESTER: ICD-10-CM

## 2024-02-26 DIAGNOSIS — O26.893 RH NEGATIVE STATE IN ANTEPARTUM PERIOD, THIRD TRIMESTER: ICD-10-CM

## 2024-02-26 DIAGNOSIS — Z67.91 RH NEGATIVE STATE IN ANTEPARTUM PERIOD, THIRD TRIMESTER: ICD-10-CM

## 2024-02-26 DIAGNOSIS — O13.3 GESTATIONAL HYPERTENSION, THIRD TRIMESTER: ICD-10-CM

## 2024-02-26 PROBLEM — Z34.90 ENCOUNTER FOR ELECTIVE INDUCTION OF LABOR: Status: ACTIVE | Noted: 2024-02-26

## 2024-02-26 LAB
ABO GROUP BLD: NORMAL
ALBUMIN SERPL BCP-MCNC: 3.6 G/DL (ref 3.5–5)
ALP SERPL-CCNC: 112 U/L (ref 34–104)
ALT SERPL W P-5'-P-CCNC: 19 U/L (ref 7–52)
ANION GAP SERPL CALCULATED.3IONS-SCNC: 11 MMOL/L
AST SERPL W P-5'-P-CCNC: 20 U/L (ref 13–39)
BILIRUB SERPL-MCNC: 0.37 MG/DL (ref 0.2–1)
BLD GP AB SCN SERPL QL: POSITIVE
BLOOD GROUP ANTIBODIES SERPL: NORMAL
BUN SERPL-MCNC: 12 MG/DL (ref 5–25)
CALCIUM SERPL-MCNC: 8.6 MG/DL (ref 8.4–10.2)
CHLORIDE SERPL-SCNC: 105 MMOL/L (ref 96–108)
CO2 SERPL-SCNC: 21 MMOL/L (ref 21–32)
CREAT SERPL-MCNC: 0.96 MG/DL (ref 0.6–1.3)
CREAT UR-MCNC: 227.5 MG/DL
ERYTHROCYTE [DISTWIDTH] IN BLOOD BY AUTOMATED COUNT: 13.3 % (ref 11.6–15.1)
GFR SERPL CREATININE-BSD FRML MDRD: 80 ML/MIN/1.73SQ M
GLUCOSE SERPL-MCNC: 85 MG/DL (ref 65–140)
HCT VFR BLD AUTO: 33.3 % (ref 34.8–46.1)
HGB BLD-MCNC: 11.4 G/DL (ref 11.5–15.4)
MCH RBC QN AUTO: 31.4 PG (ref 26.8–34.3)
MCHC RBC AUTO-ENTMCNC: 34.2 G/DL (ref 31.4–37.4)
MCV RBC AUTO: 92 FL (ref 82–98)
PLATELET # BLD AUTO: 258 THOUSANDS/UL (ref 149–390)
PMV BLD AUTO: 9.6 FL (ref 8.9–12.7)
POTASSIUM SERPL-SCNC: 3.4 MMOL/L (ref 3.5–5.3)
PROT SERPL-MCNC: 6.7 G/DL (ref 6.4–8.4)
PROT UR-MCNC: 27 MG/DL
PROT/CREAT UR: 0.12 MG/G{CREAT} (ref 0–0.1)
RBC # BLD AUTO: 3.63 MILLION/UL (ref 3.81–5.12)
RH BLD: NEGATIVE
SL AMB  POCT GLUCOSE, UA: NEGATIVE
SL AMB POCT URINE PROTEIN: ABNORMAL
SODIUM SERPL-SCNC: 137 MMOL/L (ref 135–147)
SPECIMEN EXPIRATION DATE: NORMAL
WBC # BLD AUTO: 11.91 THOUSAND/UL (ref 4.31–10.16)

## 2024-02-26 PROCEDURE — 3E033VJ INTRODUCTION OF OTHER HORMONE INTO PERIPHERAL VEIN, PERCUTANEOUS APPROACH: ICD-10-PCS | Performed by: OBSTETRICS & GYNECOLOGY

## 2024-02-26 PROCEDURE — 0KQM0ZZ REPAIR PERINEUM MUSCLE, OPEN APPROACH: ICD-10-PCS | Performed by: OBSTETRICS & GYNECOLOGY

## 2024-02-26 PROCEDURE — PNV: Performed by: OBSTETRICS & GYNECOLOGY

## 2024-02-26 PROCEDURE — 82570 ASSAY OF URINE CREATININE: CPT

## 2024-02-26 PROCEDURE — 86870 RBC ANTIBODY IDENTIFICATION: CPT

## 2024-02-26 PROCEDURE — 86901 BLOOD TYPING SEROLOGIC RH(D): CPT

## 2024-02-26 PROCEDURE — 4A1HXCZ MONITORING OF PRODUCTS OF CONCEPTION, CARDIAC RATE, EXTERNAL APPROACH: ICD-10-PCS | Performed by: OBSTETRICS & GYNECOLOGY

## 2024-02-26 PROCEDURE — 84156 ASSAY OF PROTEIN URINE: CPT

## 2024-02-26 PROCEDURE — 80053 COMPREHEN METABOLIC PANEL: CPT

## 2024-02-26 PROCEDURE — 81002 URINALYSIS NONAUTO W/O SCOPE: CPT | Performed by: OBSTETRICS & GYNECOLOGY

## 2024-02-26 PROCEDURE — 85027 COMPLETE CBC AUTOMATED: CPT

## 2024-02-26 PROCEDURE — 86900 BLOOD TYPING SEROLOGIC ABO: CPT

## 2024-02-26 PROCEDURE — 3E0P7VZ INTRODUCTION OF HORMONE INTO FEMALE REPRODUCTIVE, VIA NATURAL OR ARTIFICIAL OPENING: ICD-10-PCS | Performed by: OBSTETRICS & GYNECOLOGY

## 2024-02-26 PROCEDURE — 10907ZC DRAINAGE OF AMNIOTIC FLUID, THERAPEUTIC FROM PRODUCTS OF CONCEPTION, VIA NATURAL OR ARTIFICIAL OPENING: ICD-10-PCS | Performed by: OBSTETRICS & GYNECOLOGY

## 2024-02-26 PROCEDURE — 86850 RBC ANTIBODY SCREEN: CPT

## 2024-02-26 PROCEDURE — 86780 TREPONEMA PALLIDUM: CPT

## 2024-02-26 RX ORDER — NYSTATIN 100000 [USP'U]/G
POWDER TOPICAL 3 TIMES DAILY
Status: DISCONTINUED | OUTPATIENT
Start: 2024-02-26 | End: 2024-02-27

## 2024-02-26 RX ORDER — ALBUTEROL SULFATE 2.5 MG/3ML
2.5 SOLUTION RESPIRATORY (INHALATION) EVERY 6 HOURS PRN
Status: DISCONTINUED | OUTPATIENT
Start: 2024-02-26 | End: 2024-02-27

## 2024-02-26 RX ORDER — BUPIVACAINE HYDROCHLORIDE 2.5 MG/ML
30 INJECTION, SOLUTION EPIDURAL; INFILTRATION; INTRACAUDAL ONCE AS NEEDED
Status: DISCONTINUED | OUTPATIENT
Start: 2024-02-26 | End: 2024-02-27

## 2024-02-26 RX ORDER — ONDANSETRON 2 MG/ML
4 INJECTION INTRAMUSCULAR; INTRAVENOUS EVERY 6 HOURS PRN
Status: DISCONTINUED | OUTPATIENT
Start: 2024-02-26 | End: 2024-02-27

## 2024-02-26 RX ORDER — SODIUM CHLORIDE, SODIUM LACTATE, POTASSIUM CHLORIDE, CALCIUM CHLORIDE 600; 310; 30; 20 MG/100ML; MG/100ML; MG/100ML; MG/100ML
125 INJECTION, SOLUTION INTRAVENOUS CONTINUOUS
Status: DISCONTINUED | OUTPATIENT
Start: 2024-02-26 | End: 2024-02-27

## 2024-02-26 RX ORDER — ALBUTEROL SULFATE 90 UG/1
2 AEROSOL, METERED RESPIRATORY (INHALATION) EVERY 6 HOURS PRN
Status: DISCONTINUED | OUTPATIENT
Start: 2024-02-26 | End: 2024-02-27

## 2024-02-26 RX ADMIN — SODIUM CHLORIDE, SODIUM LACTATE, POTASSIUM CHLORIDE, AND CALCIUM CHLORIDE 125 ML/HR: .6; .31; .03; .02 INJECTION, SOLUTION INTRAVENOUS at 20:47

## 2024-02-26 RX ADMIN — PENICILLIN G POTASSIUM 5 MILLION UNITS: 20000000 INJECTION, POWDER, FOR SOLUTION INTRAVENOUS at 20:47

## 2024-02-26 RX ADMIN — Medication 25 MCG: at 22:20

## 2024-02-26 NOTE — PROGRESS NOTES
Patient here for prenatal visit.   GA: 37w0d    Denies leaking of fluid, bleeding, cramping  Normal Fetal Movement  Patient having arabella callahan.  Urine is trace protein/neg glucose  GBS: positive  Will be admitted 24 at night for cervical ripening and induced 24.  Cervical check requested

## 2024-02-26 NOTE — PROGRESS NOTES
Problem List Items Addressed This Visit       Rh negative state in antepartum period, third trimester     Rp rhogam. F/u baby's blood type pp         37 weeks gestation of pregnancy - Primary     Doing well. +FM. Denies ctx, vb and lof.   Has IOL scheduled tonight  SVE today 3  Discussed induction process in detail, reviewed cervical ripening options.            Gestational hypertension, third trimester     Bp normotensive today  Pre-e labs on admission to L&D          Other Visit Diagnoses       Prenatal care, first pregnancy, third trimester        Relevant Orders    POCT urine dip (Completed)            Elizabeth Bear is a 28 y.o.  at 37w0d who presents today for routine prenatal visit.     /78 (BP Location: Left arm, Patient Position: Sitting, Cuff Size: Standard)   Pulse 101   Wt 117 kg (258 lb)   LMP 2023 (Exact Date)   BMI 40.41 kg/m²

## 2024-02-26 NOTE — ASSESSMENT & PLAN NOTE
Doing well. +FM. Denies ctx, vb and lof.   Has IOL scheduled tonight  SVE today FT/50/-3  Discussed induction process in detail, reviewed cervical ripening options.

## 2024-02-27 ENCOUNTER — ANESTHESIA EVENT (INPATIENT)
Dept: ANESTHESIOLOGY | Facility: HOSPITAL | Age: 29
End: 2024-02-27
Payer: COMMERCIAL

## 2024-02-27 ENCOUNTER — ANESTHESIA (INPATIENT)
Dept: ANESTHESIOLOGY | Facility: HOSPITAL | Age: 29
End: 2024-02-27
Payer: COMMERCIAL

## 2024-02-27 LAB
ALBUMIN SERPL BCP-MCNC: 3.6 G/DL (ref 3.5–5)
ALP SERPL-CCNC: 103 U/L (ref 34–104)
ALT SERPL W P-5'-P-CCNC: 19 U/L (ref 7–52)
ANION GAP SERPL CALCULATED.3IONS-SCNC: 7 MMOL/L
AST SERPL W P-5'-P-CCNC: 18 U/L (ref 13–39)
BASE EXCESS BLDCOA CALC-SCNC: -8 MMOL/L (ref 3–11)
BASE EXCESS BLDCOV CALC-SCNC: -4.5 MMOL/L (ref 1–9)
BASOPHILS # BLD AUTO: 0.04 THOUSANDS/ÂΜL (ref 0–0.1)
BASOPHILS NFR BLD AUTO: 0 % (ref 0–1)
BILIRUB SERPL-MCNC: 0.41 MG/DL (ref 0.2–1)
BUN SERPL-MCNC: 8 MG/DL (ref 5–25)
CALCIUM SERPL-MCNC: 8.8 MG/DL (ref 8.4–10.2)
CHLORIDE SERPL-SCNC: 105 MMOL/L (ref 96–108)
CO2 SERPL-SCNC: 24 MMOL/L (ref 21–32)
CREAT SERPL-MCNC: 0.51 MG/DL (ref 0.6–1.3)
EOSINOPHIL # BLD AUTO: 0.07 THOUSAND/ÂΜL (ref 0–0.61)
EOSINOPHIL NFR BLD AUTO: 1 % (ref 0–6)
ERYTHROCYTE [DISTWIDTH] IN BLOOD BY AUTOMATED COUNT: 13.4 % (ref 11.6–15.1)
GFR SERPL CREATININE-BSD FRML MDRD: 131 ML/MIN/1.73SQ M
GLUCOSE SERPL-MCNC: 83 MG/DL (ref 65–140)
HCO3 BLDCOA-SCNC: 19.4 MMOL/L (ref 17.3–27.3)
HCO3 BLDCOV-SCNC: 20.5 MMOL/L (ref 12.2–28.6)
HCT VFR BLD AUTO: 34.7 % (ref 34.8–46.1)
HGB BLD-MCNC: 11.8 G/DL (ref 11.5–15.4)
IMM GRANULOCYTES # BLD AUTO: 0.05 THOUSAND/UL (ref 0–0.2)
IMM GRANULOCYTES NFR BLD AUTO: 0 % (ref 0–2)
LYMPHOCYTES # BLD AUTO: 1.59 THOUSANDS/ÂΜL (ref 0.6–4.47)
LYMPHOCYTES NFR BLD AUTO: 12 % (ref 14–44)
MCH RBC QN AUTO: 31.6 PG (ref 26.8–34.3)
MCHC RBC AUTO-ENTMCNC: 34 G/DL (ref 31.4–37.4)
MCV RBC AUTO: 93 FL (ref 82–98)
MONOCYTES # BLD AUTO: 0.6 THOUSAND/ÂΜL (ref 0.17–1.22)
MONOCYTES NFR BLD AUTO: 5 % (ref 4–12)
NEUTROPHILS # BLD AUTO: 10.61 THOUSANDS/ÂΜL (ref 1.85–7.62)
NEUTS SEG NFR BLD AUTO: 82 % (ref 43–75)
NRBC BLD AUTO-RTO: 0 /100 WBCS
O2 CT VFR BLDCOA CALC: 17.2 ML/DL
OXYHGB MFR BLDCOA: 75.8 %
OXYHGB MFR BLDCOV: 74.1 %
PCO2 BLDCOA: 46.4 MM[HG] (ref 30–60)
PCO2 BLDCOV: 38 MM HG (ref 27–43)
PH BLDCOA: 7.24 [PH] (ref 7.23–7.43)
PH BLDCOV: 7.35 [PH] (ref 7.19–7.49)
PLATELET # BLD AUTO: 238 THOUSANDS/UL (ref 149–390)
PMV BLD AUTO: 9.4 FL (ref 8.9–12.7)
PO2 BLDCOA: 37.1 MM HG (ref 5–25)
PO2 BLDCOV: 31.4 MM HG (ref 15–45)
POTASSIUM SERPL-SCNC: 3.8 MMOL/L (ref 3.5–5.3)
PROT SERPL-MCNC: 6.8 G/DL (ref 6.4–8.4)
RBC # BLD AUTO: 3.73 MILLION/UL (ref 3.81–5.12)
SAO2 % BLDCOV: 15.9 ML/DL
SODIUM SERPL-SCNC: 136 MMOL/L (ref 135–147)
TREPONEMA PALLIDUM IGG+IGM AB [PRESENCE] IN SERUM OR PLASMA BY IMMUNOASSAY: NORMAL
WBC # BLD AUTO: 12.96 THOUSAND/UL (ref 4.31–10.16)

## 2024-02-27 PROCEDURE — 59400 OBSTETRICAL CARE: CPT | Performed by: OBSTETRICS & GYNECOLOGY

## 2024-02-27 PROCEDURE — 82805 BLOOD GASES W/O2 SATURATION: CPT | Performed by: OBSTETRICS & GYNECOLOGY

## 2024-02-27 PROCEDURE — 80053 COMPREHEN METABOLIC PANEL: CPT | Performed by: OBSTETRICS & GYNECOLOGY

## 2024-02-27 PROCEDURE — 85025 COMPLETE CBC W/AUTO DIFF WBC: CPT | Performed by: OBSTETRICS & GYNECOLOGY

## 2024-02-27 RX ORDER — IBUPROFEN 600 MG/1
600 TABLET ORAL EVERY 6 HOURS
Status: DISCONTINUED | OUTPATIENT
Start: 2024-02-27 | End: 2024-02-29 | Stop reason: HOSPADM

## 2024-02-27 RX ORDER — ACETAMINOPHEN 325 MG/1
650 TABLET ORAL EVERY 4 HOURS PRN
Status: DISCONTINUED | OUTPATIENT
Start: 2024-02-27 | End: 2024-02-29 | Stop reason: HOSPADM

## 2024-02-27 RX ORDER — ONDANSETRON 2 MG/ML
4 INJECTION INTRAMUSCULAR; INTRAVENOUS EVERY 8 HOURS PRN
Status: DISCONTINUED | OUTPATIENT
Start: 2024-02-27 | End: 2024-02-29 | Stop reason: HOSPADM

## 2024-02-27 RX ORDER — BENZOCAINE/MENTHOL 6 MG-10 MG
1 LOZENGE MUCOUS MEMBRANE DAILY PRN
Status: DISCONTINUED | OUTPATIENT
Start: 2024-02-27 | End: 2024-02-29 | Stop reason: HOSPADM

## 2024-02-27 RX ORDER — SENNOSIDES 8.6 MG
1 TABLET ORAL DAILY
Status: DISCONTINUED | OUTPATIENT
Start: 2024-02-28 | End: 2024-02-29 | Stop reason: HOSPADM

## 2024-02-27 RX ORDER — ROPIVACAINE HYDROCHLORIDE 2 MG/ML
INJECTION, SOLUTION EPIDURAL; INFILTRATION; PERINEURAL AS NEEDED
Status: DISCONTINUED | OUTPATIENT
Start: 2024-02-27 | End: 2024-02-28 | Stop reason: HOSPADM

## 2024-02-27 RX ORDER — LIDOCAINE HYDROCHLORIDE AND EPINEPHRINE 15; 5 MG/ML; UG/ML
INJECTION, SOLUTION EPIDURAL
Status: COMPLETED | OUTPATIENT
Start: 2024-02-27 | End: 2024-02-27

## 2024-02-27 RX ORDER — SIMETHICONE 80 MG
80 TABLET,CHEWABLE ORAL 4 TIMES DAILY PRN
Status: DISCONTINUED | OUTPATIENT
Start: 2024-02-27 | End: 2024-02-29 | Stop reason: HOSPADM

## 2024-02-27 RX ORDER — OXYTOCIN/RINGER'S LACTATE 30/500 ML
1-30 PLASTIC BAG, INJECTION (ML) INTRAVENOUS
Status: DISCONTINUED | OUTPATIENT
Start: 2024-02-27 | End: 2024-02-27

## 2024-02-27 RX ORDER — CALCIUM CARBONATE 500 MG/1
1000 TABLET, CHEWABLE ORAL DAILY PRN
Status: DISCONTINUED | OUTPATIENT
Start: 2024-02-27 | End: 2024-02-29 | Stop reason: HOSPADM

## 2024-02-27 RX ORDER — OXYTOCIN/RINGER'S LACTATE 30/500 ML
250 PLASTIC BAG, INJECTION (ML) INTRAVENOUS ONCE
Status: DISCONTINUED | OUTPATIENT
Start: 2024-02-27 | End: 2024-02-29 | Stop reason: HOSPADM

## 2024-02-27 RX ADMIN — ROPIVACAINE HYDROCHLORIDE 5 ML: 2 INJECTION EPIDURAL; INFILTRATION; PERINEURAL at 13:50

## 2024-02-27 RX ADMIN — ONDANSETRON 4 MG: 2 INJECTION INTRAMUSCULAR; INTRAVENOUS at 13:58

## 2024-02-27 RX ADMIN — ONDANSETRON 4 MG: 2 INJECTION INTRAMUSCULAR; INTRAVENOUS at 00:26

## 2024-02-27 RX ADMIN — IBUPROFEN 600 MG: 600 TABLET, FILM COATED ORAL at 23:06

## 2024-02-27 RX ADMIN — WITCH HAZEL 1 PAD: 500 SOLUTION RECTAL; TOPICAL at 23:07

## 2024-02-27 RX ADMIN — HYDROCORTISONE 1 APPLICATION: 1 CREAM TOPICAL at 23:07

## 2024-02-27 RX ADMIN — PENICILLIN G POTASSIUM 2.5 MILLION UNITS: 20000000 INJECTION, POWDER, FOR SOLUTION INTRAVENOUS at 00:27

## 2024-02-27 RX ADMIN — PENICILLIN G POTASSIUM 2.5 MILLION UNITS: 20000000 INJECTION, POWDER, FOR SOLUTION INTRAVENOUS at 20:56

## 2024-02-27 RX ADMIN — PENICILLIN G POTASSIUM 2.5 MILLION UNITS: 20000000 INJECTION, POWDER, FOR SOLUTION INTRAVENOUS at 13:00

## 2024-02-27 RX ADMIN — SODIUM CHLORIDE, SODIUM LACTATE, POTASSIUM CHLORIDE, AND CALCIUM CHLORIDE 125 ML/HR: .6; .31; .03; .02 INJECTION, SOLUTION INTRAVENOUS at 13:27

## 2024-02-27 RX ADMIN — LIDOCAINE HYDROCHLORIDE AND EPINEPHRINE 3 ML: 15; 5 INJECTION, SOLUTION EPIDURAL at 13:46

## 2024-02-27 RX ADMIN — PENICILLIN G POTASSIUM 2.5 MILLION UNITS: 20000000 INJECTION, POWDER, FOR SOLUTION INTRAVENOUS at 17:07

## 2024-02-27 RX ADMIN — PENICILLIN G POTASSIUM 2.5 MILLION UNITS: 20000000 INJECTION, POWDER, FOR SOLUTION INTRAVENOUS at 09:12

## 2024-02-27 RX ADMIN — Medication 2 MILLI-UNITS/MIN: at 08:19

## 2024-02-27 RX ADMIN — ROPIVACAINE HYDROCHLORIDE: 2 INJECTION, SOLUTION EPIDURAL; INFILTRATION at 21:05

## 2024-02-27 RX ADMIN — ROPIVACAINE HYDROCHLORIDE: 2 INJECTION, SOLUTION EPIDURAL; INFILTRATION at 13:50

## 2024-02-27 RX ADMIN — BENZOCAINE AND LEVOMENTHOL 1 APPLICATION: 200; 5 SPRAY TOPICAL at 23:07

## 2024-02-27 RX ADMIN — LIDOCAINE HYDROCHLORIDE AND EPINEPHRINE 2 ML: 15; 5 INJECTION, SOLUTION EPIDURAL at 13:50

## 2024-02-27 RX ADMIN — PENICILLIN G POTASSIUM 2.5 MILLION UNITS: 20000000 INJECTION, POWDER, FOR SOLUTION INTRAVENOUS at 05:07

## 2024-02-27 RX ADMIN — ONDANSETRON 4 MG: 2 INJECTION INTRAMUSCULAR; INTRAVENOUS at 20:53

## 2024-02-27 NOTE — OB LABOR/OXYTOCIN SAFETY PROGRESS
Oxytocin Safety Progress Check Note - Elizabeth Bear 28 y.o. female MRN: [id number]    Unit/Bed#: -01 Encounter: [id number]    Dose (judy-units/min) Oxytocin: 14 judy-units/min  Contraction Frequency (minutes): 2-3.5  Contraction Intensity: Moderate  Uterine Activity Characteristics: Regular  Cervical Dilation: 6        Cervical Effacement: 90  Fetal Station: -1  Baseline Rate (FHR): 140 bpm  Fetal Heart Rate (FHT): 141 BPM  FHR Category: I               Vital Signs:   Vitals:    02/27/24 1730   BP: 122/74   Pulse: 88   Resp:    Temp:    SpO2:        Notes/comments:   Elizabeth is feeling well and is comfortable with epidural. SVE as above, FHR category I, ham every 2 minutes on pitocin of 14. Continue pitocin titration and maternal position changes.    Maribeth Sanz MD 2/27/2024 5:54 PM

## 2024-02-27 NOTE — OB LABOR/OXYTOCIN SAFETY PROGRESS
Oxytocin Safety Progress Check Note - Elizabeth Bear 28 y.o. female MRN: [id number]    Unit/Bed#: -01 Encounter: [id number]    Dose (judy-units/min) Oxytocin: 10 judy-units/min  Contraction Frequency (minutes): 2.5-3.5  Contraction Intensity: Mild/Moderate  Uterine Activity Characteristics: Regular  Cervical Dilation: 5        Cervical Effacement: 70  Fetal Station: -2  Baseline Rate (FHR): 130 bpm  Fetal Heart Rate (FHT): 130 BPM  FHR Category: 1               Vital Signs:   Vitals:    02/27/24 1223   BP: 130/76   Pulse: 78   Resp:    Temp:    SpO2:        Patient getting more uncomfortable with contractions.  SVE as above. FHT cat 1. Simon q2.5-3.5. Amniotomy performed for copious amounts of clear fluid. Pitocin at 10, will continue to titrate as tolerated. D/w Dr. Ott.       Zofia Felton MD 2/27/2024 12:56 PM

## 2024-02-27 NOTE — ANESTHESIA PROCEDURE NOTES
Epidural Block    Patient location during procedure: OB/L&D  Start time: 2/27/2024 1:46 PM  Reason for block: procedure for pain  Staffing  Performed by: Jaki Mercer MD  Authorized by: Jaki Mercer MD    Preanesthetic Checklist  Completed: patient identified, IV checked, site marked, risks and benefits discussed, surgical consent, monitors and equipment checked, pre-op evaluation and timeout performed  Epidural  Patient position: sitting  Prep: ChloraPrep  Sedation Level: no sedation  Patient monitoring: frequent blood pressure checks, continuous pulse oximetry and heart rate  Approach: midline  Location: lumbar, L4-5  Injection technique: CAROL saline  Needle  Needle type: Tuohy   Needle gauge: 18 G  Needle insertion depth: 8 cm  Catheter type: multi-orifice  Catheter size: 20 G  Catheter at skin depth: 13 cm  Catheter securement method: stabilization device and clear occlusive dressing  Test dose: negativelidocaine-epinephrine (XYLOCAINE-MPF/EPINEPHRINE) 1.5 %-1:200,000 injection 3 mL - Epidural   3 mL - 2/27/2024 1:46:00 PM  Assessment  Sensory level: T10  Number of attempts: 1  Events: paresthesianegative aspiration for CSF, negative aspiration for heme and no paresthesia on injection  patient tolerated the procedure well with no immediate complications  Additional Notes  1 smooth attempt. Transient paresthesia with catheter advancement, resolved, no pain with medicine injection

## 2024-02-27 NOTE — ASSESSMENT & PLAN NOTE
Admission CBC, CMP wnl   Urine PC 0.12  Systolic (12hrs), Av , Min:115 , Max:119   Diastolic (12hrs), Av, Min:56, Max:74  Asymptomatic at this time  Continue to monitor

## 2024-02-27 NOTE — OB LABOR/OXYTOCIN SAFETY PROGRESS
Labor Progress Note - Elizabeth Bear 28 y.o. female MRN: [id number]    Unit/Bed#: LD TRIAGE 5-01 Encounter: [id number]                Cervical Dilation: Fingertip        Cervical Effacement: 50  Fetal Station: -3        FHR Category: I               Vital Signs:   Vitals:    02/26/24 2017   BP: 126/72   Pulse: 100   Resp: 18   Temp: 98 °F (36.7 °C)       Notes/comments:   Elizabeth is resting comfortably. Gutierrez balloon placed as below.    PROCEDURE:  GUTIERREZ BALLOON PLACEMENT    A 24F gutierrez with a 30cc balloon was selected and speculum was placed and cervical os was visualized. Balloon advanced through cervix beyond the internal cervical os with aid of ring forcep. A small amount amount of sterile saline solution was instilled in the balloon to confirm placement. Placement was confirmed to be beyond the internal cervical os. A total of 60cc of sterile saline solution was placed into the balloon. Pt tolerated well. Notify MD when gutierrez dislodged.    25 mcg vaginal cytotec placed after gutierrez balloon was placed.      Maribeth Sanz MD 2/26/2024 10:24 PM

## 2024-02-27 NOTE — PLAN OF CARE
Problem: PAIN - ADULT  Goal: Verbalizes/displays adequate comfort level or baseline comfort level  Description: Interventions:  - Encourage patient to monitor pain and request assistance  - Assess pain using appropriate pain scale  - Administer analgesics based on type and severity of pain and evaluate response  - Implement non-pharmacological measures as appropriate and evaluate response  - Consider cultural and social influences on pain and pain management  - Notify physician/advanced practitioner if interventions unsuccessful or patient reports new pain  Outcome: Progressing     Problem: INFECTION - ADULT  Goal: Absence or prevention of progression during hospitalization  Description: INTERVENTIONS:  - Assess and monitor for signs and symptoms of infection  - Monitor lab/diagnostic results  - Monitor all insertion sites, i.e. indwelling lines, tubes, and drains  - Monitor endotracheal if appropriate and nasal secretions for changes in amount and color  - [place] appropriate cooling/warming therapies per order  - Administer medications as ordered  - Instruct and encourage patient and family to use good hand hygiene technique  - Identify and instruct in appropriate isolation precautions for identified infection/condition  Outcome: Progressing  Goal: Absence of fever/infection during neutropenic period  Description: INTERVENTIONS:  - Monitor WBC    Outcome: Progressing     Problem: SAFETY ADULT  Goal: Patient will remain free of falls  Description: INTERVENTIONS:  - Educate patient/family on patient safety including physical limitations  - Instruct patient to call for assistance with activity   - Consult OT/PT to assist with strengthening/mobility   - Keep Call bell within reach  - Keep bed low and locked with side rails adjusted as appropriate  - Keep care items and personal belongings within reach  - Initiate and maintain comfort rounds  - Make Fall Risk Sign visible to staff  - Offer Toileting every 2 Hours,  in advance of need  - Initiate/Maintain bed alarm if needed for safety  - Obtain necessary fall risk management equipment: no-slip socks provided  - Apply yellow socks and bracelet for high fall risk patients  - Consider moving patient to room near nurses station  Outcome: Progressing  Goal: Maintain or return to baseline ADL function  Description: INTERVENTIONS:  -  Assess patient's ability to carry out ADLs; assess patient's baseline for ADL function and identify physical deficits which impact ability to perform ADLs (bathing, care of mouth/teeth, toileting, grooming, dressing, etc.)  - Assess/evaluate cause of self-care deficits   - Assess range of motion  - Assess patient's mobility; develop plan if impaired  - Assess patient's need for assistive devices and provide as appropriate  - Encourage maximum independence but intervene and supervise when necessary  - Involve family in performance of ADLs  - Assess for home care needs following discharge   - Consider OT consult to assist with ADL evaluation and planning for discharge  - Provide patient education as appropriate  Outcome: Progressing  Goal: Maintains/Returns to pre admission functional level  Description: INTERVENTIONS:  - Perform AM-PAC 6 Click Basic Mobility/ Daily Activity assessment daily.  - Set and communicate daily mobility goal to care team and patient/family/caregiver.   - Collaborate with rehabilitation services on mobility goals if consulted  - Perform Range of Motion PRN times a day.  - Reposition patient every PRN hours.  - Dangle patient PRN times a day  - Stand patient PRN times a day  - Ambulate patient PRN times a day  - Out of bed to chair PRN times a day   - Out of bed for meals PRN times a day  - Out of bed for toileting  - Record patient progress and toleration of activity level   Outcome: Progressing     Problem: Knowledge Deficit  Goal: Patient/family/caregiver demonstrates understanding of disease process, treatment plan,  medications, and discharge instructions  Description: Complete learning assessment and assess knowledge base.  Interventions:  - Provide teaching at level of understanding  - Provide teaching via preferred learning methods  Outcome: Progressing     Problem: DISCHARGE PLANNING  Goal: Discharge to home or other facility with appropriate resources  Description: INTERVENTIONS:  - Identify barriers to discharge w/patient and caregiver  - Arrange for needed discharge resources and transportation as appropriate  - Identify discharge learning needs (meds, wound care, etc.)  - Arrange for interpretive services to assist at discharge as needed  - Refer to Case Management Department for coordinating discharge planning if the patient needs post-hospital services based on physician/advanced practitioner order or complex needs related to functional status, cognitive ability, or social support system  Outcome: Progressing

## 2024-02-27 NOTE — OB LABOR/OXYTOCIN SAFETY PROGRESS
Oxytocin Safety Progress Check Note - Elizabeth Bear 28 y.o. female MRN: [id number]    Unit/Bed#: -01 Encounter: [id number]    Dose (judy-units/min) Oxytocin: 12 judy-units/min  Contraction Frequency (minutes): 2-3  Contraction Intensity: Moderate  Uterine Activity Characteristics: Regular  Cervical Dilation: 5        Cervical Effacement: 70  Fetal Station: -2  Baseline Rate (FHR): 135 bpm  Fetal Heart Rate (FHT): 142 BPM  FHR Category: 1  Keyes: q3-5 mins             Vital Signs:   Vitals:    02/27/24 1500   BP: 130/63   Pulse: 96   Resp: 18   Temp: 98.4 °F (36.9 °C)   SpO2:        Notes/comments:   Unchanged since last exam, maybe slightly thinner than last exam.   Continue to titrate pitocin as ordered.   Continue repositioning with peanut ball, feels OT this exam  Recheck prn    Janay Ott DO 2/27/2024 3:35 PM

## 2024-02-27 NOTE — OB LABOR/OXYTOCIN SAFETY PROGRESS
Labor Progress Note - Elizabeth Bear 28 y.o. female MRN: [id number]    Unit/Bed#: LD TRIAGE 5-01 Encounter: [id number]       Contraction Frequency (minutes): 0     Uterine Activity Characteristics: Irritability  Cervical Dilation: 4        Cervical Effacement: 60  Fetal Station: -3  Baseline Rate (FHR): 140 bpm  Fetal Heart Rate (FHT): 142 BPM  FHR Category: I               Vital Signs:   Vitals:    02/26/24 2017   BP: 126/72   Pulse: 100   Resp: 18   Temp: 98 °F (36.7 °C)       Notes/comments:   Goldman balloon expelled, SVE as above. FHR category I. Due to acuity of labor floor Elizabeth remains in triage at this time. Plan to start pitocin when she is able to be moved to a labor room.    Maribeth Sanz MD 2/27/2024 2:50 AM

## 2024-02-27 NOTE — H&P
H & P- Obstetrics   Elizabeth Bear 28 y.o. female MRN: [id number]  Unit/Bed#: LD TRIAGE  Encounter: [id number]    Assessment: 28 y.o.  at 37w0d admitted for induction of labor for gestational hypertension.  SVE: Cervical Dilation: Fingertip  Cervical Effacement: 50  Fetal Station: -3  OB Examiner: DEEP  FHT: baseline 135bpm, 15x15 accelerations, no decelerations, category I  Clinical EFW: 34%ile ; Cephalic confirmed by US  GBS status: positive   Postpartum contraception plan: barrier    Plan:   Encounter for induction of labor  Assessment & Plan  Admit   T&S, CBC, RPR  CLD  IV fluids  GBS prophylaxis is needed, PCN ordered  Induction with irby balloon and cytotec      Gestational hypertension, third trimester  Assessment & Plan  Follow up CBC, CMP, Urine P/C  Trend blood pressures    Rh negative state in antepartum period, third trimester  Assessment & Plan  Rhogam panel postpartum    Asthma  Assessment & Plan  Continue home albuterol  Avoid hemabate      Discussed case and plan w/ Dr. Dave      Chief Complaint: none    HPI: Elizabeth Bear is a 28 y.o.  with an KWAME of 3/18/2024, by Last Menstrual Period at 37w0d who is being admitted for induction of labor for gestational hypertension at term. She denies having uterine contractions, has no LOF, and reports no VB. She states she has felt good FM.    Patient Active Problem List   Diagnosis    Allergic rhinitis    Asthma    PTSD (post-traumatic stress disorder)    Endometriosis    Peripheral polyneuropathy    POTS (postural orthostatic tachycardia syndrome)    Chronic migraine without aura without status migrainosus, not intractable    Gross hematuria    Chronic neck pain    Abnormal glucose affecting pregnancy    Obesity affecting pregnancy in third trimester    Rh negative state in antepartum period, third trimester    37 weeks gestation of pregnancy    ASCUS with positive high risk HPV cervical    Candida infection of flexural skin     Vulvovaginal candidiasis    Back pain affecting pregnancy in third trimester    Elevated BP without diagnosis of hypertension    Supervision of normal pregnancy    Nipple dermatitis    Pelvic cramping    Fall at home, initial encounter    De Quervain's tenosynovitis, right    Shortness of breath during pregnancy    Gestational hypertension, third trimester    Encounter for induction of labor       Baby complications/comments: none    Review of Systems   Constitutional:  Negative for chills and fever.   Eyes:  Negative for photophobia and visual disturbance.   Respiratory:  Negative for cough and shortness of breath.    Cardiovascular:  Negative for chest pain and palpitations.   Gastrointestinal:  Negative for abdominal pain, constipation, diarrhea, nausea and vomiting.   Genitourinary:  Negative for dysuria, hematuria, pelvic pain, urgency, vaginal bleeding and vaginal discharge.   Musculoskeletal:  Negative for arthralgias and back pain.   Skin:  Negative for color change and rash.   Neurological:  Negative for dizziness, seizures, syncope, weakness and headaches.   All other systems reviewed and are negative.      OB Hx:  OB History    Para Term  AB Living   2       1     SAB IAB Ectopic Multiple Live Births   0   1          # Outcome Date GA Lbr Garcia/2nd Weight Sex Delivery Anes PTL Lv   2 Current            1 Ectopic 22     SAB         Birth Comments: question chemical?      Obstetric Comments   3/8/2022 Fluctuating beta HCG, resolved after ovarian cystectomy of hemorrhagic corpus luteal cyst and D+C       Past Medical Hx:  Past Medical History:   Diagnosis Date    Abnormal Pap smear of cervix 2023    ASCUS HPV other    Allergic rhinitis     Anxiety     Asthma     Cyst of right ovary 2022    Depression     well managed with therapy    Ectopic pregnancy 2022    Questional cyst vs ectopic    Endometriosis     Intractable migraine with status migrainosus 10/08/2022     Miscarriage 02/2022    POTS (postural orthostatic tachycardia syndrome)     PTSD (post-traumatic stress disorder)     S/P D&C (status post dilation and curettage) 03/13/2022    S/P ovarian cystectomy 03/13/2022       Past Surgical hx:  Past Surgical History:   Procedure Laterality Date    DILATION AND CURETTAGE OF UTERUS N/A 03/08/2022    Procedure: DILATATION AND CURETTAGE (D&C);  Surgeon: Serenity Livingston MD;  Location: [facility];  Service: Gynecology    LAPAROSCOPIC ENDOMETRIOSIS FULGURATION  03/03/2021    MD LAPS ABD PRTM&OMENTUM DX W/WO SPEC BR/WA SPX N/A 03/08/2022    Procedure: LAPAROSCOPY DIAGNOSTIC, right ovarian cystectomy;  Surgeon: Serenity Livingston MD;  Location: [facility];  Service: Gynecology       Social Hx:  Alcohol use: denies  Tobacco use: denies  Other substance use: denies    No Known Allergies    Medications Prior to Admission   Medication    magnesium oxide (MAG-OX) 400 mg tablet    nystatin (MYCOSTATIN) powder    Prenatal Vit-Fe Fumarate-FA (PRENATAL PO)    albuterol (2.5 mg/3 mL) 0.083 % nebulizer solution    albuterol (ProAir HFA) 90 mcg/act inhaler    Blood Glucose Monitoring Suppl (Contour Next Gen Monitor) w/Device KIT    Contour Next Test test strip    Elastic Bandages & Supports (Abdominal Binder/Elastic XL) MISC    Elastic Bandages & Supports (Wrist Splint/Left Medium) MISC    hydrocortisone 2.5 % cream    Microlet Lancets MISC       Objective:  Temp:  [98 °F (36.7 °C)] 98 °F (36.7 °C)  HR:  [100-101] 100  Resp:  [18] 18  BP: (126)/(72-78) 126/72  Body mass index is 40.41 kg/m².     Physical Exam:  Physical Exam  Constitutional:       General: She is not in acute distress.  Eyes:      Conjunctiva/sclera: Conjunctivae normal.      Pupils: Pupils are equal, round, and reactive to light.   Cardiovascular:      Rate and Rhythm: Normal rate and regular rhythm.      Pulses: Normal pulses.      Heart sounds: Normal heart sounds.   Pulmonary:      Effort: Pulmonary effort is normal. No  respiratory distress.      Breath sounds: Normal breath sounds.   Abdominal:      Comments: gravid   Musculoskeletal:      Right lower leg: No edema.      Left lower leg: No edema.   Neurological:      General: No focal deficit present.      Mental Status: She is alert and oriented to person, place, and time.   Skin:     General: Skin is warm and dry.      Capillary Refill: Capillary refill takes less than 2 seconds.            FHT:  Baseline Rate (FHR): 135 bpm  Accelerations: 15 x 15 or greater  Decelerations: None  FHR Category: Category I    TOCO:        Lab Results   Component Value Date    WBC 11.91 (H) 02/26/2024    HGB 11.4 (L) 02/26/2024    HCT 33.3 (L) 02/26/2024     02/26/2024     Lab Results   Component Value Date    K 3.4 (L) 02/26/2024     02/26/2024    CO2 21 02/26/2024    BUN 12 02/26/2024    CREATININE 0.96 02/26/2024    AST 20 02/26/2024    ALT 19 02/26/2024     Prenatal Labs: Reviewed      Bloodtype:O  Rh Factor (no units)   Date Value   08/26/2023 Negative     Strep Grp B PCR (no units)   Date Value   02/19/2024 Positive (A)     HIV-1/HIV-2 Ab (no units)   Date Value   01/12/2022 Non-Reactive     Rubella IgG Quant (IU/mL)   Date Value   06/29/2023 19.2     RPR (6/29/23) non-reactive  Hepatitis B Surface Ag (no units)   Date Value   06/29/2023 Non-reactive     Chlamydia trachomatis, DNA Probe (no units)   Date Value   06/29/2023 Negative     N gonorrhoeae, DNA Probe (no units)   Date Value   06/29/2023 Negative     Glucose (mg/dL)   Date Value   12/16/2023 173 (H)     GLUCOSE 1 HOUR: 173  Platelets (Thousands/uL)   Date Value   02/26/2024 258   02/10/2024 297   12/16/2023 327   08/26/2023 346     Hemoglobin (g/dL)   Date Value   02/26/2024 11.4 (L)   02/10/2024 11.9   12/16/2023 11.4 (L)   08/26/2023 12.7     >2 Midnights  INPATIENT       Signature/Title: Dieter Hector MD  Date: 2/26/2024  Time: 10:32 PM

## 2024-02-27 NOTE — OB LABOR/OXYTOCIN SAFETY PROGRESS
Oxytocin Safety Progress Check Note - Elizabeth Bear 28 y.o. female MRN: [id number]    Unit/Bed#: -01 Encounter: [id number]    Dose (judy-units/min) Oxytocin: 8 judy-units/min  Contraction Frequency (minutes): Occassional  Contraction Intensity: Mild  Uterine Activity Characteristics: Irregular  Cervical Dilation: 4        Cervical Effacement: 60  Fetal Station: -3  Baseline Rate (FHR): 140 bpm  Fetal Heart Rate (FHT): 149 BPM  FHR Category: 1               Vital Signs:   Vitals:    02/27/24 1037   BP: 129/78   Pulse: 82   Resp:    Temp:    SpO2:        Patient comfortable, able to walk and bounce on ball.  SVE as above. FHT cat 1. Simon irregularly. Pitocin at 6, will continue to titrate as tolerated. D/w Dr. Ott.       Zofia Felton MD 2/27/2024 10:45 AM

## 2024-02-27 NOTE — OB LABOR/OXYTOCIN SAFETY PROGRESS
Labor Progress Note - Elizabeth Bear 28 y.o. female MRN: [id number]    Unit/Bed#: -01 Encounter: [id number]     Contraction Frequency (minutes): irregular, 5-7 mins  Contraction Intensity: Mild  Uterine Activity Characteristics: Irregular  Cervical Dilation: 4      Cervical Effacement: 60  Fetal Station: -3  Baseline Rate (FHR): 145 bpm  Fetal Heart Rate (FHT): 120 BPM  FHR Category: 1         Vital Signs:   Vitals:    02/27/24 0733   BP: 141/89   Pulse: 92   Resp: 18   Temp: 98.5 °F (36.9 °C)   SpO2: 97%       Notes/comments:   Patient seen at bedside, partner present. Feeling well, ready to get pitocin started. IOL has been on hold since 0200 since FB was expelled due to acuity of the labor floor and lack of labor rooms.   Bps are normotensive to mild range, she is asymptomatic  Rpt cbc and cmp this AM.     Pit bundle completed and pitocin ordered.   Recheck in 2-4h or prn.  GBS pos- PCN ordered    Janay Ott DO 2/27/2024 7:54 AM

## 2024-02-27 NOTE — ANESTHESIA PREPROCEDURE EVALUATION
"Procedure:  LABOR ANALGESIA    Relevant Problems   CARDIO   (+) Chronic migraine without aura without status migrainosus, not intractable      GYN   (+) 37 weeks gestation of pregnancy   (+) Supervision of normal pregnancy      NEURO/PSYCH   (+) Chronic migraine without aura without status migrainosus, not intractable   (+) Chronic neck pain   (+) PTSD (post-traumatic stress disorder)      PULMONARY   (+) Asthma   (+) Shortness of breath during pregnancy      Recent labs personally reviewed:  Lab Results   Component Value Date    WBC 12.96 (H) 02/27/2024    HGB 11.8 02/27/2024     02/27/2024     Lab Results   Component Value Date    K 3.8 02/27/2024    BUN 8 02/27/2024    CREATININE 0.51 (L) 02/27/2024     No results found for: \"PTT\"   No results found for: \"INR\"    Lab Results   Component Value Date    HGBA1C 5.5 08/26/2023       Type and Screen:  O           Anesthesia Plan  ASA Score- 3     Anesthesia Type- epidural with ASA Monitors.         Additional Monitors:     Airway Plan:            Plan Factors-    Chart reviewed.   Existing labs reviewed. Patient summary reviewed.                  Induction- intravenous.    Postoperative Plan-     Informed Consent- Anesthetic plan and risks discussed with patient.  I personally reviewed this patient with the CRNA. Discussed and agreed on the Anesthesia Plan with the CRNA..                "

## 2024-02-27 NOTE — ASSESSMENT & PLAN NOTE
Continue routine post partum care  Pain well controlled: tylenol/motrin scheduled  Lochia within normal limits: continue to monitor   OOB: as able, encourage ambulation  Passing flatus  Voiding spontaneously  Breastfeeding  Baby in: room  Dispo: anticipate d/c home PPD2

## 2024-02-28 PROCEDURE — 88307 TISSUE EXAM BY PATHOLOGIST: CPT | Performed by: PATHOLOGY

## 2024-02-28 PROCEDURE — 99024 POSTOP FOLLOW-UP VISIT: CPT | Performed by: OBSTETRICS & GYNECOLOGY

## 2024-02-28 PROCEDURE — NC001 PR NO CHARGE: Performed by: STUDENT IN AN ORGANIZED HEALTH CARE EDUCATION/TRAINING PROGRAM

## 2024-02-28 RX ADMIN — ACETAMINOPHEN 650 MG: 325 TABLET, FILM COATED ORAL at 22:38

## 2024-02-28 RX ADMIN — ACETAMINOPHEN 650 MG: 325 TABLET, FILM COATED ORAL at 08:47

## 2024-02-28 RX ADMIN — SENNOSIDES 8.6 MG: 8.6 TABLET, FILM COATED ORAL at 08:39

## 2024-02-28 RX ADMIN — IBUPROFEN 600 MG: 600 TABLET, FILM COATED ORAL at 23:55

## 2024-02-28 RX ADMIN — IBUPROFEN 600 MG: 600 TABLET, FILM COATED ORAL at 18:02

## 2024-02-28 RX ADMIN — IBUPROFEN 600 MG: 600 TABLET, FILM COATED ORAL at 11:35

## 2024-02-28 RX ADMIN — IBUPROFEN 600 MG: 600 TABLET, FILM COATED ORAL at 05:26

## 2024-02-28 NOTE — LACTATION NOTE
This note was copied from a baby's chart.  Follow up Lactation: mom called for help with latching baby to the breast. Mom has been trying for 10 min., mom states deep suck is not occurred. Mom has baby in cross cradle hold. Mom does not have enough pillows to lift baby and breast. Alignment of baby is not for a deep latch.     Education and reinforcement on how to lift the breast and create a shelf for breast and baby. Alignment of chin to breast to achieve a deep latch.     Active, coordinated sucking noted after pauses. Ed. On how baby's muscles move at the breast. Ed. On practice make progress.     Enc. Mom that overnight if she doesn't feel that baby was successfully latched, After approx. 15 min. Unlatch baby and use manual pump to express colostrum. Mom transferred 1.4 mls of colostrum from the right breast. Syringe feeding was demonstrated to FOB.     Baby had stool and urine output. Demonstration and teach-back of how to change a diaper. FOB was slow in the process and baby had second urine output.    Brought baby up to the left breast in football hold. Active, coordinated sucking noted.     Enc. To call lactation for next latch.     Education of breastfeeding with large breasts. Demonstration and teach back of positioning and alignment. Use pillows, tables, rolled towels/blankets to lift breast. Lift baby up to breast level. Education on hand expression prior to latch, positioning of hand to compress the breast, and positioning and alignment of baby for deep latch with large breasts.     Education on positioning and alignment. Mom is encouraged to:     - Bring baby up to the breast (use of pillows to elevate so baby's torso is against mom's breasts)   - Skin to skin for feedings with top hand exposed to show signs of satiation   - Chin deep into breast tissue (make baby look up to the nipple)   - nose aligned to the nipple   -Wait for wide gape, drag chin on the breast so nipple is aimed at the upper, back  palate  - Cheek should be touching breast   - Deep, firm hold of baby with ear, shoulder, hip alignment    Provided demonstration, education and support of deep latch to breast by placing the nipple to the nose, dragging down to chin to achieve a wide latch. Bring baby to the breast, not breast to baby. Move your shoulders down and away from your ears. Look for ear, shoulder, hip alignment. Baby's upper and lower lip should be flanged on the breast.    Education on alternative feeding methods. Demonstration and teach back of ( syringe). Baby took 1.4 mls of expressed colostrum. Encouraged to call lactation for additional assistance with feedings.    Encouragement and reassurance provided.    .

## 2024-02-28 NOTE — LACTATION NOTE
This note was copied from a baby's chart.  CONSULT - LACTATION  Baby Boy (Jen Bear 1 days male MRN: [id number]    [facility] Room / Bed: (N)/(N) Encounter: [id number]    Maternal Information     MOTHER:  Elizabeth Bear  Maternal Age: 28 y.o.   OB History: # 1 - Date: 22, Sex: None, Weight: None, GA: None, Delivery: Spontaneous , Apgar1: None, Apgar5: None, Living: None, Birth Comments: question chemical?    # 2 - Date: 24, Sex: Male, Weight: 2920 g (6 lb 7 oz), GA: 37w1d, Delivery: Vaginal, Spontaneous, Apgar1: 9, Apgar5: 9, Living: Living, Birth Comments: None   Previouse breast reduction surgery? No    Lactation history:   Has patient previously breast fed: No   How long had patient previously breast fed:     Previous breast feeding complications:       Past Surgical History:   Procedure Laterality Date    DILATION AND CURETTAGE OF UTERUS N/A 2022    Procedure: DILATATION AND CURETTAGE (D&C);  Surgeon: Serenity Livingston MD;  Location: [facility];  Service: Gynecology    LAPAROSCOPIC ENDOMETRIOSIS FULGURATION  2021    HI LAPS ABD PRTM&OMENTUM DX W/WO SPEC BR/WA SPX N/A 2022    Procedure: LAPAROSCOPY DIAGNOSTIC, right ovarian cystectomy;  Surgeon: Serenity Livingston MD;  Location: [facility];  Service: Gynecology        Birth information:  YOB: 2024   Time of birth: 10:10 PM   Sex: male   Delivery type: Vaginal, Spontaneous   Birth Weight: 2920 g (6 lb 7 oz)   Percent of Weight Change: 0%     Gestational Age: 37w1d   [unfilled]    Assessment     Breast and nipple assessment:  no clinical assessment  - see note below      Assessment:  in family's arms    Feeding assessment:  latch difficulty; sleepy  LATCH:  Latch: Repeated attempts, hold nipple in mouth, stimulate to suck   Audible Swallowing: A few with stimulation   Type of Nipple: Everted (After stimulation)   Comfort (Breast/Nipple):  "Soft/non-tender   Hold (Positioning): Partial assist, teach one side, mother does other, staff holds   LATCH Score: 7          Feeding recommendations:  breast feed on demand. Mom wants to breastfeed. Baby has not been latching well. Mom states staff gave her a hand pump. Mom is concerned about possible yeast infection on the nipple. Mom states she began nystatin powder during pregnancy due to heat rash under the breast and against the rib cage. Mom sates the areola has grown, become fibrous, and has begun to have flaky skin.     Upon palpation and visual assessment: Bilateral rash under the breasts is reduced to not visible to the naked eye. Areola is lighter in color at base of nipple to 1\" into the areola. Full anterior areola, fibrous with touch. With reverse pressure softening, softer areola and less dense tissue noted. Slight dry skin noted on the areola. No redness, no irritation on nipple/areola noted.     Discussed drying affect of nystation powder in bra could reach nipple/areola. Ed. On no signs or symptoms of thrush on breast. Discussed what signs and symptoms would occur.    Enc. To call lactation for next latch as mom has family arriving and she does not want to feed.     Ed. On timing of feeds, signs of satiation.    Mom has s1 at home    Rsb/DC and handouts reviewed  Handouts:   Mother-led latch,   Latch Checklist  Large Breasts,     Information on hand expression given. Discussed benefits of knowing how to manually express breast including stimulating milk supply, softening nipple for latch and evacuating breast in the event of engorgement.    Mom is encouraged to place baby skin to skin for feedings. Skin to skin education provided for baby placement on mother's chest, baby only in diaper, blankets below shoulders on baby's back. Skin to skin is encouraged to continue at home for feedings and between feedings.    Worked on positioning infant up at chest level and starting to feed infant with nose " arriving at the nipple. Then, using areolar compression to achieve a deep latch that is comfortable and exchanges optimum amounts of milk.     - Start feedings on breast that last feeding ended   - allow no more than 3 hours between breast feeding sessions   - time between feedings is counted from the beginning of the first feed to the beginning of the next feeding session    Reviewed early signs of hunger, including tensing of hands and shoulders - no need to wait for open eyes.  Crying is a late hunger sign.  If baby is crying, soothe baby first and then attempt to latch.  Reviewed normal sucking patterns: transition from stimulation to nutritive to release or non-nutritive. The goal is to see and hear lots of swallowing.    Reviewed normal nursing pattern: infant could latch on one breast up to 30 minutes or until releases on own. Signs of satiation is open hand with fingers that do not grab your finger.  Discussed difference in sensation of non-nutritive v nutritive sucking    Met with mother. Provided mother with Ready, Set, Baby booklet.    Discussed Skin to Skin contact an benefits to mom and baby.  Talked about the delay of the first bath until baby has adjusted. Spoke about the benefits of rooming in. Feeding on cue and what that means for recognizing infant's hunger. Avoidance of pacifiers for the first month discussed. Talked about exclusive breastfeeding for the first 6 months.    Positioning and latch reviewed as well as showing images of other feeding positions.  Discussed the properties of a good latch in any position. Reviewed hand/manual expression.  Discussed s/s that baby is getting enough milk and some s/s that breastfeeding dyad may need further help.    Gave information on common concerns, what to expect the first few weeks after delivery, preparing for other caregivers, and how partners can help. Resources for support also provided.    Encouraged parents to call for assistance, questions, and  concerns about breastfeeding.  Extension provided.    Provided education on growth spurts, when to introduce bottles; paced bottle feeding, and non-nutritive suck at the breast. Provided education on Signs of satiation. Encouraged to call lactation to observe a latch prior to discharge for reassurance. Encouraged to call baby and me with any questions and closely monitor output.        Christy Hume, MA 2/28/2024 1:59 PM

## 2024-02-28 NOTE — OB LABOR/OXYTOCIN SAFETY PROGRESS
Oxytocin Safety Progress Check Note - Elizabeth Bear 28 y.o. female MRN: [id number]    Unit/Bed#: -01 Encounter: [id number]    Dose (judy-units/min) Oxytocin: 20 judy-units/min  Contraction Frequency (minutes): 2-4  Contraction Intensity: Moderate  Uterine Activity Characteristics: Regular  Cervical Dilation: Lip/rim (Comment)        Cervical Effacement: 100  Fetal Station: 1  Baseline Rate (FHR): 145 bpm  Fetal Heart Rate (FHT): 149 BPM  FHR Category: I               Vital Signs:   Vitals:    02/27/24 2030   BP: 132/84   Pulse: 105   Resp:    Temp:    SpO2:        Notes/comments:   Britni is resting comfortably, not feeling any increased pressure yet. SVE as above. Continue pitocin titration and position changes.     Maribeth Sanz MD 2/27/2024 8:44 PM

## 2024-02-28 NOTE — DISCHARGE INSTRUCTIONS
Self Care After Delivery   AMBULATORY CARE:   The postpartum period is the period of time from delivery to about 6 weeks. During this time you may experience many physical and emotional changes. It is important to understand what is normal and when you need to call your healthcare provider. It is also important to know how to care for yourself during this time.  Call your local emergency number (911 in the [place]) for any of the following:   You see or hear things that are not there, or have thoughts of harming yourself or your baby.     You soak through 1 pad in 15 minutes, have blurry vision, clammy or pale skin, and feel faint.     You faint or lose consciousness.     You have trouble breathing.     You cough up blood.     Your  incision comes apart.     Seek care immediately if:   Your heart is beating faster than usual.     You have a bad headache or changes in your vision.     Your perineal tear, episiotomy site, or  incision is red, swollen, bleeding, or draining pus.     You have severe abdominal pain.     Call your doctor or obstetrician if:   Your leg is painful, red, and larger than usual.     You soak through 1 or more pads in an hour, or pass blood clots larger than a quarter from your vagina.     You have a fever.     You have new or worsening pain in your abdomen or vagina.     You continue to have depression 1 to 2 weeks after you deliver.     You have trouble sleeping.     You have foul-smelling discharge from your vagina.     You have pain or burning when you urinate.     You do not have a bowel movement for 3 days or more.     You have nausea or are vomiting.     You have hard lumps or red streaks over your breasts.     You have cracked nipples or bleed from your nipples.     You have questions or concerns about your condition or care.     Physical changes: The following are normal changes after you give birth:  Pain in the area between your anus and vagina     Breast pain      Constipation or hemorrhoids     Hot or cold flashes     Vaginal bleeding or discharge     Mild to moderate abdominal cramping     Difficulty controlling bowel movements or urine     Emotional changes: A drop in hormone levels after you deliver may cause changes in your emotions. You may feel irritable, sad, or anxious. You may cry easily or for no reason. You may also feel depressed. Depression that continues can be a sign of postpartum depression, a condition that can be treated. Treatment may include talk therapy, medicines, or both. Healthcare providers will ask how you are feeling and if you have any depression. These talks can happen during appointments for your medical care and for your baby's care, such as well child visits. Providers can help you find ways to care for yourself and your baby. Talk to your providers about the following:  When emotional changes or depression started, and if it is getting worse over time     Problems you are having with daily activities, sleep, or caring for your baby     If anything makes you feel worse, or makes you feel better     Feeling that you are not bonding with your baby the way you want     Any problems your baby has with sleeping or feeding     Your baby is fussy or cries a lot     Support you have from friends, family, or others     Breast care for breastfeeding mothers: You may have sore breasts for 3 to 6 days after you give birth. This happens as your milk begins to fill your breasts. You may also have sore breasts if you do not breastfeed frequently. Do the following to care for your breasts:  Apply a moist, warm, compress to your breast as directed. This may help soothe your breasts. Make sure the washcloth is not too hot before you apply it to your breast.     Nurse your baby or pump your milk frequently. This may prevent clogged milk ducts. Ask your healthcare provider how often to nurse or pump.     Massage your breasts as directed. This may help increase  your milk flow. Gently rub your breasts in a circular motion before you breastfeed. You may need to gently squeeze your breast or nipple to help release milk. You can also use a breast pump to help release milk from your breast.     Wash your breasts with warm water only. Do not put soap on your nipples. Soap may cause your nipples to become dry.     Apply lanolin cream to your nipples as directed. Lanolin cream may add moisture to your skin and prevent nipple dryness. Always wash off lanolin cream with warm water before you breastfeed.     Place pads in your bra. Your nipples may leak milk when you are not breastfeeding. You can place pads inside of your bra to help prevent leaking onto your clothing. Ask your healthcare provider where to purchase bra pads.     Get breastfeeding support if needed. Healthcare providers can answer questions about breastfeeding and provide you with support. Ask your healthcare provider who you can contact if you need breastfeeding support.     Breast care for non-breastfeeding mothers: Milk will fill your breasts even if you bottle feed your baby. Do the following to help stop your milk from filling your breasts and causing pain:  Wear a bra with support at all times. A sports bra or a tight-fitting bra will help stop your milk from coming in.     Apply ice on each breast for 15 to 20 minutes every hour or as directed. Use an ice pack, or put crushed ice in a plastic bag. Cover it with a towel before you apply it to your breast. Ice helps your milk ducts shrink.     Keep your breasts away from warm water. Warm water will make it easier for milk to fill your breasts. Stand with your breasts away from warm water in the shower.     Limit how much you touch your breasts. This will prevent them from filling with milk.     Perineum care: Your perineum is the area between your rectum and vagina. It is normal to have swelling and pain in this area after you give birth. If you had an  episiotomy, your healthcare provider may give you special instructions.  Clean your perineum after you use the bathroom. This may prevent infection and help with healing. Use a spray bottle with warm water to clean your perineum. You may also gently spray warm water against your perineum when you urinate. Always wipe front to back.     Take a sitz bath as directed. A sitz bath may help relieve swelling and pain. Fill your bath tub or bucket with water up to your hips and sit in the water. Use cold water for 2 days after you deliver. Then use warm water. Ask your healthcare provider for more information about a sitz bath.     Apply ice packs for the first 24 hours or as directed. Use a plastic glove filled with ice or buy an ice pack. Wrap the ice pack or plastic glove in a small towel or wash cloth. Place the ice pack on your perineum for 20 minutes at a time.     Sit on a donut-shaped pillow. This may relieve pressure on your perineum when you sit.     Use wipes that contain medicine or take pills as directed. Your healthcare provider may tell you to use witch hazel pads. You can place witch hazel pads in the refrigerator before you apply them to your perineum. Your provider may also tell you to take NSAIDs. Ask him or her how often to take pills or use the wipes.     Do not go swimming or take tub baths for 4 to 6 weeks or as directed. This will help prevent an infection in your vagina or uterus.     Bowel and bladder care: It may take 3 to 5 days to have a bowel movement after you deliver your baby. You can do the following to prevent or manage constipation, and get control of your bowel or bladder:  Take stool softeners as directed. A stool softener is medicine that will make your bowel movements softer. This may prevent or relieve constipation. A stool softener may also make bowel movements less painful.     Drink plenty of liquids. Ask how much liquid to drink each day and which liquids are best for you.  Liquids may help prevent constipation.     Eat foods high in fiber. Examples include fruits, vegetables, grains, beans, and lentils. Ask your healthcare provider how much fiber you need each day. Fiber may prevent constipation.          Do Kegel exercises as directed. Kegel exercises will help strengthen the muscles that control bowel movements and urination. Ask your healthcare provider for more information on Kegel exercises.     Apply cold compresses or medicine to hemorrhoids as directed. This may relieve swelling and pain. Your healthcare provider may tell you to apply ice or wipes that contain medicine to your hemorrhoids. He or she may also tell you to use a sitz bath. Ask your provider for more information on how to manage hemorrhoids.     Nutrition: Good nutrition is important in the postpartum period. It will help you return to a healthy weight, increase your energy levels, and prevent constipation. It will also help you get enough nutrients and calories if you are going to breastfeed your baby.  Eat a variety of healthy foods. Healthy foods include fruits, vegetables, whole-grain breads, low-fat dairy products, beans, lean meats, and fish. You may need 500 to 700 extra calories each day if you breastfeed your baby. You may also need extra protein.          Limit foods with added sugar and high amounts of fat. These foods are high in calories and low in healthy nutrients. Read food labels so you know how much sugar and fat is in the food you want to eat.     Drink 8 to 10 glasses of water per day. Water will help you make plenty of milk for your baby. It will also help prevent constipation. Drink a glass of water every time you breastfeed your baby.     Take vitamins as directed. Ask your healthcare provider what vitamins you need.     Limit caffeine and alcohol if you are breastfeeding. Caffeine and alcohol can get into your breast milk. Caffeine and alcohol can make your baby fussy. They can also  interfere with your baby's sleep. Ask your healthcare provider if you can drink alcohol or caffeine.     Rest and sleep: You may feel very tired in the postpartum period. Enough sleep will help you heal and give you energy to care for your baby. The following may help you get sleep and rest:  Nap when your baby naps. Your baby may nap several times during the day. Get rest during this time.     Limit visitors. Many people may want to see you and your baby. Ask friends or family to visit on different days. This will give you time to rest.     Do not plan too much for one day. Put off household chores so that you have time to rest. Gradually do more each day.     Ask for help from family, friends, or neighbors. Ask them to help you with laundry, cleaning, or errands. Also ask someone to watch the baby while you take a nap or relax. Ask your partner to help with the care of your baby. Pump some of your breast milk so your partner can feed your baby during the night.     Exercise after delivery: Wait until your healthcare provider says it is okay to exercise. Exercise can help you lose weight, increase your energy levels, and manage your mood. It can also prevent constipation and blood clots. Start with gentle exercises such as walking. Do more as you have more energy. You may need to avoid abdominal exercises for 1 to 2 weeks after you deliver. Talk to your healthcare provider about an exercise plan that is right for you.     Sexual activity after delivery:   Do not have sex until your healthcare provider says it is okay. You may need to wait 4 to 6 weeks before you have sex. This may prevent infection and allow time to heal.     Your menstrual cycle may begin as soon as 3 weeks after you deliver. Your period may be delayed if you breastfeed your baby. You can become pregnant before you get your first postpartum period. Talk to your healthcare provider about birth control that is right for you. Some types of birth  control are not safe during breastfeeding.     For support and more information: Join a support group for new mothers. Ask for help from family and friends with chores, errands, and care of your baby.  Office of Women's Health,  Department of Health and Human Services  [address] [place] [address]  [address] [place] [address]  Phone: [phone]  Web Address: www.womenshealth.gov  [facility]partum [facility]  [address] [place] [address]  Web Address: [email or web address]  Follow up with your doctor or obstetrician as directed: You will need to follow up within 2 to 6 weeks of delivery. Write down your questions so you remember to ask them at your visits.  © Copyright [employer] 2020 Information is for End User's use only and may not be sold, redistributed or otherwise used for commercial purposes. All illustrations and images included in CareNotes® are the copyrighted property of [employer] or [employer]  The above information is an  only. It is not intended as medical advice for individual conditions or treatments. Talk to your doctor, nurse or pharmacist before following any medical regimen to see if it is safe and effective for you.

## 2024-02-28 NOTE — PLAN OF CARE
Problem: PAIN - ADULT  Goal: Verbalizes/displays adequate comfort level or baseline comfort level  Description: Interventions:  - Encourage patient to monitor pain and request assistance  - Assess pain using appropriate pain scale  - Administer analgesics based on type and severity of pain and evaluate response  - Implement non-pharmacological measures as appropriate and evaluate response  - Consider cultural and social influences on pain and pain management  - Notify physician/advanced practitioner if interventions unsuccessful or patient reports new pain  2/27/2024 2254 by Tarah Toribio RN  Outcome: Progressing  2/27/2024 1924 by Tarah Toribio RN  Outcome: Progressing     Problem: INFECTION - ADULT  Goal: Absence or prevention of progression during hospitalization  Description: INTERVENTIONS:  - Assess and monitor for signs and symptoms of infection  - Monitor lab/diagnostic results  - Monitor all insertion sites, i.e. indwelling lines, tubes, and drains  - Monitor endotracheal if appropriate and nasal secretions for changes in amount and color  - Parker appropriate cooling/warming therapies per order  - Administer medications as ordered  - Instruct and encourage patient and family to use good hand hygiene technique  - Identify and instruct in appropriate isolation precautions for identified infection/condition  2/27/2024 2254 by Tarah Toribio RN  Outcome: Progressing  2/27/2024 1924 by Tarah Toribio RN  Outcome: Progressing  Goal: Absence of fever/infection during neutropenic period  Description: INTERVENTIONS:  - Monitor WBC    2/27/2024 2254 by Tarah Toribio RN  Outcome: Progressing  2/27/2024 1924 by Tarah Toribio RN  Outcome: Progressing     Problem: SAFETY ADULT  Goal: Patient will remain free of falls  Description: INTERVENTIONS:  - Educate patient/family on patient safety including physical limitations  - Instruct patient to call for assistance with activity   - Consult OT/PT  to assist with strengthening/mobility   - Keep Call bell within reach  - Keep bed low and locked with side rails adjusted as appropriate  - Keep care items and personal belongings within reach  - Initiate and maintain comfort rounds  - Make Fall Risk Sign visible to staff  - Apply yellow socks and bracelet for high fall risk patients  - Consider moving patient to room near nurses station  2/27/2024 2254 by Tarah Toribio RN  Outcome: Progressing  2/27/2024 1924 by Tarah Toribio RN  Outcome: Progressing  Goal: Maintain or return to baseline ADL function  Description: INTERVENTIONS:  -  Assess patient's ability to carry out ADLs; assess patient's baseline for ADL function and identify physical deficits which impact ability to perform ADLs (bathing, care of mouth/teeth, toileting, grooming, dressing, etc.)  - Assess/evaluate cause of self-care deficits   - Assess range of motion  - Assess patient's mobility; develop plan if impaired  - Assess patient's need for assistive devices and provide as appropriate  - Encourage maximum independence but intervene and supervise when necessary  - Involve family in performance of ADLs  - Assess for home care needs following discharge   - Consider OT consult to assist with ADL evaluation and planning for discharge  - Provide patient education as appropriate  2/27/2024 2254 by Tarah Toribio RN  Outcome: Progressing  2/27/2024 1924 by Tarah Toribio RN  Outcome: Progressing  Goal: Maintains/Returns to pre admission functional level  Description: INTERVENTIONS:  - Perform AM-PAC 6 Click Basic Mobility/ Daily Activity assessment daily.  - Set and communicate daily mobility goal to care team and patient/family/caregiver.   - Collaborate with rehabilitation services on mobility goals if consulted  - Out of bed for toileting  - Record patient progress and toleration of activity level   2/27/2024 2254 by Tarah Toribio RN  Outcome: Progressing  2/27/2024 1924 by Tarah  ANDRE Toribio  Outcome: Progressing     Problem: Knowledge Deficit  Goal: Patient/family/caregiver demonstrates understanding of disease process, treatment plan, medications, and discharge instructions  Description: Complete learning assessment and assess knowledge base.  Interventions:  - Provide teaching at level of understanding  - Provide teaching via preferred learning methods  2/27/2024 2254 by Tarah Toribio RN  Outcome: Progressing  2/27/2024 1924 by Tarah Toribio RN  Outcome: Progressing  Goal: Verbalizes understanding of labor plan  Description: Assess patient/family/caregiver's baseline knowledge level and ability to understand information.  Provide education via patient/family/caregiver's preferred learning method at appropriate level of understanding.     1. Provide teaching at level of understanding.  2. Provide teaching via preferred learning method(s).  2/27/2024 2254 by Tarah Toribio RN  Outcome: Progressing  2/27/2024 1924 by Tarah Toribio RN  Outcome: Progressing     Problem: DISCHARGE PLANNING  Goal: Discharge to home or other facility with appropriate resources  Description: INTERVENTIONS:  - Identify barriers to discharge w/patient and caregiver  - Arrange for needed discharge resources and transportation as appropriate  - Identify discharge learning needs (meds, wound care, etc.)  - Arrange for interpretive services to assist at discharge as needed  - Refer to Case Management Department for coordinating discharge planning if the patient needs post-hospital services based on physician/advanced practitioner order or complex needs related to functional status, cognitive ability, or social support system  2/27/2024 2254 by Tarah Toribio RN  Outcome: Progressing  2/27/2024 1924 by Tarah Toribio RN  Outcome: Progressing     Problem: Labor & Delivery  Goal: Manages discomfort  Description: Assess and monitor for signs and symptoms of discomfort.  Assess patient's pain level  regularly and per hospital policy.  Administer medications as ordered. Support use of nonpharmacological methods to help control pain such as distraction, imagery, relaxation, and application of heat and cold.  Collaborate with interdisciplinary team and patient to determine appropriate pain management plan.    1. Include patient in decisions related to comfort.  2. Offer non-pharmacological pain management interventions.  3. Report ineffective pain management to physician.  2024 by Tarah Toribio RN  Outcome: Progressing  2024 by Tarah Toribio RN  Outcome: Progressing  Goal: Patient vital signs are stable  Description: 1. Assess vital signs - vaginal delivery.  2024 by Tarah Toribio RN  Outcome: Progressing  2024 by Tarah Toribio RN  Outcome: Progressing     Problem: BIRTH - VAGINAL/ SECTION  Goal: Fetal and maternal status remain reassuring during the birth process  Description: INTERVENTIONS:  - Monitor vital signs  - Monitor fetal heart rate  - Monitor uterine activity  - Monitor labor progression (vaginal delivery)  - DVT prophylaxis  - Antibiotic prophylaxis  2024 by Tarah Toribio RN  Outcome: Completed  2024 by Tarah Toribio RN  Outcome: Progressing  Goal: Emotionally satisfying birthing experience for mother/fetus  Description: Interventions:  - Assess, plan, implement and evaluate the nursing care given to the patient in labor  - Advocate the philosophy that each childbirth experience is a unique experience and support the family's chosen level of involvement and control during the labor process   - Actively participate in both the patient's and family's teaching of the birth process  - Consider cultural, Anab[employer]ism and age-specific factors and plan care for the patient in labor  2024 by Tarah Toribio RN  Outcome: Completed  2024 by Tarah Toribio RN  Outcome: Progressing     Problem:  POSTPARTUM  Goal: Experiences normal postpartum course  Description: INTERVENTIONS:  - Monitor maternal vital signs  - Assess uterine involution and lochia  Outcome: Progressing  Goal: Appropriate maternal -  bonding  Description: INTERVENTIONS:  - Identify family support  - Assess for appropriate maternal/infant bonding   -Encourage maternal/infant bonding opportunities  - Referral to  or  as needed  Outcome: Progressing  Goal: Establishment of infant feeding pattern  Description: INTERVENTIONS:  - Assess breast/bottle feeding  - Refer to lactation as needed  Outcome: Progressing  Goal: Incision(s), wounds(s) or drain site(s) healing without S/S of infection  Description: INTERVENTIONS  - Assess and document dressing, incision, wound bed, drain sites and surrounding tissue  - Provide patient and family education  Outcome: Progressing

## 2024-02-28 NOTE — DISCHARGE SUMMARY
Discharge Summary - Elizabeth Bear 28 y.o. female MRN: [id number]    Unit/Bed#: -01 Encounter: [id number]    ADMISSION  Admission Date: 2024   Admitting Attending: Dr. Dave  Admitting Diagnoses:   Patient Active Problem List   Diagnosis    Allergic rhinitis    Asthma    PTSD (post-traumatic stress disorder)    Endometriosis    Peripheral polyneuropathy    POTS (postural orthostatic tachycardia syndrome)    Chronic migraine without aura without status migrainosus, not intractable    Gross hematuria    Chronic neck pain    Abnormal glucose affecting pregnancy    Obesity affecting pregnancy in third trimester    Rh negative state in antepartum period, third trimester    37 weeks gestation of pregnancy    ASCUS with positive high risk HPV cervical    Candida infection of flexural skin    Vulvovaginal candidiasis    Back pain affecting pregnancy in third trimester    Elevated BP without diagnosis of hypertension    Supervision of normal pregnancy    Nipple dermatitis    Pelvic cramping    Fall at home, initial encounter    De Quervain's tenosynovitis, right    Shortness of breath during pregnancy    Gestational hypertension, third trimester    Encounter for induction of labor       DELIVERY  Delivery Method:     Delivery Date and Time: 2024  10:10 PM   Delivery Attending: Reema Johansen     DISCHARGE  Discharge Date: ***  Discharge Attending: ***  Discharge Diagnosis:   Same, Delivered  ***  Clinical course: Admission to Delivery  Elizabeth Bear is a 28 y.o. [id number] 37w2d . She presented to labor and delivery for induction of labor for gestational hypertension. Her pregnancy was complicated by gestational hypertension, asthma. On exam in triage she was noted to be 0.5/50/-3. She was admitted for induction. She was induced with irby balloon and cytotec and advance to pitocin. She received an epidural for pain control. Amniotomy was performed for clear fluid and she progressed to  "complete and started pushing.      Delivery  Route of Delivery:      Anesthesia: Epidural ,   QBL: Non-Surgical QBL (mL): 296      QBL:        Delivery:   at 2024  10:10 PM   ***Laceration: Perineal:   Repaired?      Baby's Weight: 2920 g (6 lb 7 oz) ; 103     Apgar scores: 9  and 9  at 1 and 5 minutes, respectively      Clinical Course: Post-Delivery:  The post delivery course was {beounremarkable:40932::\"unremarkable.\"}    On the day of discharge, the patient was ambulating, voiding spontaneously, tolerating oral intake, and hemodynamically stable. She was able to reasonably perform all ADLs. She had appropriate bowel function. Pain was well-controlled. She was discharged home on postpartum/postop day #*** without complications***. Patient was instructed to follow up with her OB as an outpatient and was given appropriate warnings to call her provider with problems or concerns.    Pertinent lab findings included:   [facility]***.     Last three Hgb values:  Lab Results   Component Value Date    HGB 11.8 2024    HGB 11.4 (L) 2024    HGB 11.9 02/10/2024        Problem-specific follow-up plans included the following:  Problem List       Allergic rhinitis    Asthma    Current Assessment & Plan     Continue home albuterol  Avoid hemabate         PTSD (post-traumatic stress disorder)    Endometriosis    Peripheral polyneuropathy    POTS (postural orthostatic tachycardia syndrome)    Chronic migraine without aura without status migrainosus, not intractable    Gross hematuria    Chronic neck pain    Abnormal glucose affecting pregnancy    Obesity affecting pregnancy in third trimester    Rh negative state in antepartum period, third trimester    Current Assessment & Plan     Rhogam panel postpartum         37 weeks gestation of pregnancy    Overview     First OB labs - complete passed early 3 hour  Gc/chlamydia - collected  Pap - due in postpartum  Blue folder - given    Genetic screening " -   AFP -screen negative     28 week labs -ordered 11/27/23  COVID vaccine -   TDAP -   Delivery consent -   Yellow folder -     Breast pump -   Pediatrician -   Perineal massage -   GBS -            ASCUS with positive high risk HPV cervical    Overview     1/2023 pap- ASCUS with + HPV  4/2023 colpo- normal appearing cervix; no biopsy taken  Plan for repeat pap in postpartum         Candida infection of flexural skin    Vulvovaginal candidiasis    Back pain affecting pregnancy in third trimester    Elevated BP without diagnosis of hypertension    Supervision of normal pregnancy    Nipple dermatitis    Pelvic cramping    Fall at home, initial encounter    De Quervain's tenosynovitis, right    Shortness of breath during pregnancy    Gestational hypertension, third trimester    Current Assessment & Plan     Follow up CBC, CMP, Urine P/C  Trend blood pressures         * (Principal) Encounter for induction of labor    Current Assessment & Plan     Admit   T&S, CBC, RPR  CLD  IV fluids  GBS prophylaxis is needed, PCN ordered  Induction with Pitocin titration  eIOL               Discharge med list:  Contraception: ***     Medication List      CONTINUE taking these medications     * Abdominal Binder/Elastic XL Misc; Use if needed (back pain) Pregnancy   belly band   * Wrist Splint/Left Medium Misc; Use daily at bedtime   Contour Next Gen Monitor w/Device Kit; Per insurance formulary. GDM   Microlet Lancets Misc; Use 4 a day. GDM  * This list has 2 medication(s) that are the same as other medications   prescribed for you. Read the directions carefully, and ask your doctor or   other care provider to review them with you.     ASK your doctor about these medications     * albuterol 90 mcg/act inhaler; Commonly known as: ProAir HFA; Inhale 2   puffs every 6 (six) hours as needed for wheezing   * albuterol (2.5 mg/3 mL) 0.083 % nebulizer solution; Take 3 mL (2.5 mg   total) by nebulization every 6 (six) hours as needed for  "wheezing or   shortness of breath   Contour Next Test test strip; Generic drug: glucose blood; Test 4 times   a day. GDM   hydrocortisone 2.5 % cream; Apply topically 2 (two) times a day Apply   pea sized amount twice daily to nipple for 10-14 days   magnesium oxide 400 mg tablet; Commonly known as: MAG-OX; Take 1 tablet   (400 mg total) by mouth daily   nystatin powder; Commonly known as: MYCOSTATIN; Apply topically 3   (three) times a day As needed   PRENATAL PO  * This list has 2 medication(s) that are the same as other medications   prescribed for you. Read the directions carefully, and ask your doctor or   other care provider to review them with you.       Condition at discharge:   {condition:77393}     Disposition:   {Discharge Disposition:26889}    Planned Readmission:   {EXAM; YES/NO:62942::\"No\"}      "

## 2024-02-28 NOTE — PLAN OF CARE
Problem: PAIN - ADULT  Goal: Verbalizes/displays adequate comfort level or baseline comfort level  Description: Interventions:  - Encourage patient to monitor pain and request assistance  - Assess pain using appropriate pain scale  - Administer analgesics based on type and severity of pain and evaluate response  - Implement non-pharmacological measures as appropriate and evaluate response  - Consider cultural and social influences on pain and pain management  - Notify physician/advanced practitioner if interventions unsuccessful or patient reports new pain  Outcome: Progressing     Problem: INFECTION - ADULT  Goal: Absence or prevention of progression during hospitalization  Description: INTERVENTIONS:  - Assess and monitor for signs and symptoms of infection  - Monitor lab/diagnostic results  - Monitor all insertion sites, i.e. indwelling lines, tubes, and drains  - Monitor endotracheal if appropriate and nasal secretions for changes in amount and color  - [place] appropriate cooling/warming therapies per order  - Administer medications as ordered  - Instruct and encourage patient and family to use good hand hygiene technique  - Identify and instruct in appropriate isolation precautions for identified infection/condition  Outcome: Progressing  Goal: Absence of fever/infection during neutropenic period  Description: INTERVENTIONS:  - Monitor WBC    Outcome: Progressing     Problem: SAFETY ADULT  Goal: Patient will remain free of falls  Description: INTERVENTIONS:  - Educate patient/family on patient safety including physical limitations  - Instruct patient to call for assistance with activity   - Consult OT/PT to assist with strengthening/mobility   - Keep Call bell within reach  - Keep bed low and locked with side rails adjusted as appropriate  - Keep care items and personal belongings within reach  - Initiate and maintain comfort rounds  - Make Fall Risk Sign visible to staff  - Apply yellow socks and bracelet  for high fall risk patients  - Consider moving patient to room near nurses station  Outcome: Progressing  Goal: Maintain or return to baseline ADL function  Description: INTERVENTIONS:  -  Assess patient's ability to carry out ADLs; assess patient's baseline for ADL function and identify physical deficits which impact ability to perform ADLs (bathing, care of mouth/teeth, toileting, grooming, dressing, etc.)  - Assess/evaluate cause of self-care deficits   - Assess range of motion  - Assess patient's mobility; develop plan if impaired  - Assess patient's need for assistive devices and provide as appropriate  - Encourage maximum independence but intervene and supervise when necessary  - Involve family in performance of ADLs  - Assess for home care needs following discharge   - Consider OT consult to assist with ADL evaluation and planning for discharge  - Provide patient education as appropriate  Outcome: Progressing  Goal: Maintains/Returns to pre admission functional level  Description: INTERVENTIONS:  - Perform AM-PAC 6 Click Basic Mobility/ Daily Activity assessment daily.  - Set and communicate daily mobility goal to care team and patient/family/caregiver.   - Collaborate with rehabilitation services on mobility goals if consulted  - Out of bed for toileting  - Record patient progress and toleration of activity level   Outcome: Progressing     Problem: Knowledge Deficit  Goal: Patient/family/caregiver demonstrates understanding of disease process, treatment plan, medications, and discharge instructions  Description: Complete learning assessment and assess knowledge base.  Interventions:  - Provide teaching at level of understanding  - Provide teaching via preferred learning methods  Outcome: Progressing  Goal: Verbalizes understanding of labor plan  Description: Assess patient/family/caregiver's baseline knowledge level and ability to understand information.  Provide education via patient/family/caregiver's  preferred learning method at appropriate level of understanding.     1. Provide teaching at level of understanding.  2. Provide teaching via preferred learning method(s).  Outcome: Progressing     Problem: DISCHARGE PLANNING  Goal: Discharge to home or other facility with appropriate resources  Description: INTERVENTIONS:  - Identify barriers to discharge w/patient and caregiver  - Arrange for needed discharge resources and transportation as appropriate  - Identify discharge learning needs (meds, wound care, etc.)  - Arrange for interpretive services to assist at discharge as needed  - Refer to Case Management Department for coordinating discharge planning if the patient needs post-hospital services based on physician/advanced practitioner order or complex needs related to functional status, cognitive ability, or social support system  Outcome: Progressing     Problem: Labor & Delivery  Goal: Manages discomfort  Description: Assess and monitor for signs and symptoms of discomfort.  Assess patient's pain level regularly and per hospital policy.  Administer medications as ordered. Support use of nonpharmacological methods to help control pain such as distraction, imagery, relaxation, and application of heat and cold.  Collaborate with interdisciplinary team and patient to determine appropriate pain management plan.    1. Include patient in decisions related to comfort.  2. Offer non-pharmacological pain management interventions.  3. Report ineffective pain management to physician.  Outcome: Progressing  Goal: Patient vital signs are stable  Description: 1. Assess vital signs - vaginal delivery.  Outcome: Progressing     Problem: BIRTH - VAGINAL/ SECTION  Goal: Fetal and maternal status remain reassuring during the birth process  Description: INTERVENTIONS:  - Monitor vital signs  - Monitor fetal heart rate  - Monitor uterine activity  - Monitor labor progression (vaginal delivery)  - DVT prophylaxis  -  Antibiotic prophylaxis  Outcome: Progressing  Goal: Emotionally satisfying birthing experience for mother/fetus  Description: Interventions:  - Assess, plan, implement and evaluate the nursing care given to the patient in labor  - Advocate the philosophy that each childbirth experience is a unique experience and support the family's chosen level of involvement and control during the labor process   - Actively participate in both the patient's and family's teaching of the birth process  - Consider cultural, Buddhism and age-specific factors and plan care for the patient in labor  Outcome: Progressing

## 2024-02-28 NOTE — ANESTHESIA POSTPROCEDURE EVALUATION
Post-Op Assessment Note    CV Status:  Stable  Pain Score: 0    Pain management: adequate      Post-op block assessment: catheter intact and no complications   Mental Status:  Alert and awake   Hydration Status:  Euvolemic   PONV Controlled:  Controlled   Airway Patency:  Patent     Post Op Vitals Reviewed: Yes    No anethesia notable event occurred.    Staff: CRNA               BP      Temp      Pulse     Resp      SpO2

## 2024-02-28 NOTE — L&D DELIVERY NOTE
Vaginal Delivery Summary - OB/GYN   Elizabeth Bear 28 y.o. female MRN: [id number]  Unit/Bed#: -01 Encounter: [id number]    Pre-delivery Diagnosis:   Pregnancy at 37w2d   Gestational hypertension  Rh negative  Asthma    Post-delivery Diagnosis: same, delivered    Procedure: Spontaneous Vaginal Delivery     [facility]    Attending Physician: Dr. Reema Johansen  Resident Physician: Dr. Maribeth Sanz    Anesthesia: Epidural ,     QBL: Non-Surgical QBL (mL): 296        Complications: none apparent    Specimens:   1. Arterial and venous cord gases  2. Cord blood  3. Segment of umbilical cord  4. Placenta to pathology    Findings:  1. Viable male  on 2024  10:10 PM  via    with APGAR scores of 9  and 9  at 1 and 5 minutes, respectively. Weight pending at time of dictation for skin to skin bonding.  2. Spontaneous delivery of intact placenta at 2216  3. 2 degree laceration repaired with 3-0 Vicryl Rapide      Gases:  Umbilical Artery  Recent Labs     24  2219   PHCART 7.239   BECART -8.0*       Umbilical Vein  Recent Labs     24  2219   PHCVEN 7.350   BECVEN -4.5*         Brief history and labor course:  Elizabeth Bear is a 28 y.o. [id number] 37w2d . She presented to labor and delivery for induction of labor for gestational hypertension. Her pregnancy was complicated by gestational hypertension, asthma. On exam in triage she was noted to be 0.5/50/-3. She was admitted for induction. She was induced with irby balloon and cytotec and advance to pitocin. She received an epidural for pain control. Amniotomy was performed for clear fluid and she progressed to complete and started pushing.    Description of delivery:    After pushing for 15 minutes, Elizabeth delivered a viable male , weight pending as mother is doing skin to skin bonding. The fetal vertex delivered GAY position spontaneously. There was one loop of nuchal cord which was  reduced. The anterior shoulder delivered atraumatically with maternal expulsive forces and the assistance of gentle downward traction. The posterior shoulder delivered with maternal expulsive forces and the assistance of gentle upward traction. The remainder of the fetus delivered spontaneously.      Upon delivery, the infant was placed on Elizabeth's abdomen and the cord was doubly clamped and cut. Delayed cord clamping was achieved. The infant was noted to cry spontaneously and was moving all extremities appropriately. There was no evidence for injury. Awaiting nurse resuscitators evaluated the . Arterial and venous cord blood gases and cord blood was collected for analysis. These were promptly sent to the lab. In the immediate post-partum, active management of the 3rd stage of labor was performed with massage, the administration of 30 units of IV pitocin, and gentle traction on the umbilical cord. The placenta delivered spontaneously and was noted to have a centrally inserted 3 vessel cord.  The placenta was sent to pathology.     Laceration Repair  The vagina, cervix, perineum, and rectum were inspected and there was noted to be a second degree perineal laceration.    The patient was comfortable with epidural for analgesia. The vaginal mucosa and submucosa were then re approximated using 3-0 vicryl rapide to the point of the hymenal ring. The superficial perineal fascia was then re approximated using 3-0 vicryl in a running non locked stitch downward followed by a running subcuticular stitch upward to the level of the introitus.    At the conclusion of the procedure, all needle, sponge, and instrument counts were noted to be correct. Dr. Johansen was present and participated in all key portions of the case.    Disposition:  The patient and the  both tolerated the procedure well and are recovering in labor and delivery room       Maribeth Sanz MD  PGY 1, Obstetrics and  Gynecology  2/28/2024  1:19 AM

## 2024-02-28 NOTE — PLAN OF CARE
Problem: PAIN - ADULT  Goal: Verbalizes/displays adequate comfort level or baseline comfort level  Description: Interventions:  - Encourage patient to monitor pain and request assistance  - Assess pain using appropriate pain scale  - Administer analgesics based on type and severity of pain and evaluate response  - Implement non-pharmacological measures as appropriate and evaluate response  - Consider cultural and social influences on pain and pain management  - Notify physician/advanced practitioner if interventions unsuccessful or patient reports new pain  Outcome: Progressing     Problem: INFECTION - ADULT  Goal: Absence or prevention of progression during hospitalization  Description: INTERVENTIONS:  - Assess and monitor for signs and symptoms of infection  - Monitor lab/diagnostic results  - Monitor all insertion sites, i.e. indwelling lines, tubes, and drains  - Monitor endotracheal if appropriate and nasal secretions for changes in amount and color  - [place] appropriate cooling/warming therapies per order  - Administer medications as ordered  - Instruct and encourage patient and family to use good hand hygiene technique  - Identify and instruct in appropriate isolation precautions for identified infection/condition  Outcome: Progressing  Goal: Absence of fever/infection during neutropenic period  Description: INTERVENTIONS:  - Monitor WBC    Outcome: Progressing     Problem: SAFETY ADULT  Goal: Patient will remain free of falls  Description: INTERVENTIONS:  - Educate patient/family on patient safety including physical limitations  - Instruct patient to call for assistance with activity   - Consult OT/PT to assist with strengthening/mobility   - Keep Call bell within reach  - Keep bed low and locked with side rails adjusted as appropriate  - Keep care items and personal belongings within reach  - Initiate and maintain comfort rounds  - Make Fall Risk Sign visible to staff  - Offer Toileting every  Hours,  in advance of need  - Initiate/Maintain alarm  - Obtain necessary fall risk management equipment:   - Apply yellow socks and bracelet for high fall risk patients  - Consider moving patient to room near nurses station  Outcome: Progressing  Goal: Maintain or return to baseline ADL function  Description: INTERVENTIONS:  -  Assess patient's ability to carry out ADLs; assess patient's baseline for ADL function and identify physical deficits which impact ability to perform ADLs (bathing, care of mouth/teeth, toileting, grooming, dressing, etc.)  - Assess/evaluate cause of self-care deficits   - Assess range of motion  - Assess patient's mobility; develop plan if impaired  - Assess patient's need for assistive devices and provide as appropriate  - Encourage maximum independence but intervene and supervise when necessary  - Involve family in performance of ADLs  - Assess for home care needs following discharge   - Consider OT consult to assist with ADL evaluation and planning for discharge  - Provide patient education as appropriate  Outcome: Progressing  Goal: Maintains/Returns to pre admission functional level  Description: INTERVENTIONS:  - Perform AM-PAC 6 Click Basic Mobility/ Daily Activity assessment daily.  - Set and communicate daily mobility goal to care team and patient/family/caregiver.   - Collaborate with rehabilitation services on mobility goals if consulted  - Perform Range of Motion  times a day.  - Reposition patient every  hours.  - Dangle patient  times a day  - Stand patient  times a day  - Ambulate patient  times a day  - Out of bed to chair  times a day   - Out of bed for meals  times a day  - Out of bed for toileting  - Record patient progress and toleration of activity level   Outcome: Progressing     Problem: Knowledge Deficit  Goal: Patient/family/caregiver demonstrates understanding of disease process, treatment plan, medications, and discharge instructions  Description: Complete learning  assessment and assess knowledge base.  Interventions:  - Provide teaching at level of understanding  - Provide teaching via preferred learning methods  Outcome: Progressing  Goal: Verbalizes understanding of labor plan  Description: Assess patient/family/caregiver's baseline knowledge level and ability to understand information.  Provide education via patient/family/caregiver's preferred learning method at appropriate level of understanding.     1. Provide teaching at level of understanding.  2. Provide teaching via preferred learning method(s).  Outcome: Progressing     Problem: DISCHARGE PLANNING  Goal: Discharge to home or other facility with appropriate resources  Description: INTERVENTIONS:  - Identify barriers to discharge w/patient and caregiver  - Arrange for needed discharge resources and transportation as appropriate  - Identify discharge learning needs (meds, wound care, etc.)  - Arrange for interpretive services to assist at discharge as needed  - Refer to Case Management Department for coordinating discharge planning if the patient needs post-hospital services based on physician/advanced practitioner order or complex needs related to functional status, cognitive ability, or social support system  Outcome: Progressing     Problem: Labor & Delivery  Goal: Manages discomfort  Description: Assess and monitor for signs and symptoms of discomfort.  Assess patient's pain level regularly and per hospital policy.  Administer medications as ordered. Support use of nonpharmacological methods to help control pain such as distraction, imagery, relaxation, and application of heat and cold.  Collaborate with interdisciplinary team and patient to determine appropriate pain management plan.    1. Include patient in decisions related to comfort.  2. Offer non-pharmacological pain management interventions.  3. Report ineffective pain management to physician.  Outcome: Progressing  Goal: Patient vital signs are  stable  Description: 1. Assess vital signs - vaginal delivery.  Outcome: Progressing     Problem: POSTPARTUM  Goal: Experiences normal postpartum course  Description: INTERVENTIONS:  - Monitor maternal vital signs  - Assess uterine involution and lochia  Outcome: Progressing  Goal: Appropriate maternal -  bonding  Description: INTERVENTIONS:  - Identify family support  - Assess for appropriate maternal/infant bonding   -Encourage maternal/infant bonding opportunities  - Referral to  or  as needed  Outcome: Progressing  Goal: Establishment of infant feeding pattern  Description: INTERVENTIONS:  - Assess breast/bottle feeding  - Refer to lactation as needed  Outcome: Progressing  Goal: Incision(s), wounds(s) or drain site(s) healing without S/S of infection  Description: INTERVENTIONS  - Assess and document dressing, incision, wound bed, drain sites and surrounding tissue  - Provide patient and family education  - Perform skin care/dressing changes every   Outcome: Progressing

## 2024-02-28 NOTE — LACTATION NOTE
This note was copied from a baby's chart.  Follow up Lactation: mom called for help with latch.     Mom states baby is demonstrating early feeding cues.    Demonstration and teach back of hand expression and nipple rolling techniques as well as how to support large breasts. Reverse pressure softening techniques demonstrated with teach back.     Handout on large breasts provided. Mom is in a laid-back positioning. Ed. On lifting the breast to see the nipple. Modified koala hold on the left breast. Demonstration and teach back of breast compressions, how baby can breathe at the breast, and how to complete feeding log with FOB. Baby latched and actively sucked for 7 min. Before unlatching.     Changed to football hold on the right breast with pillows to support to see latch. Deep latch achieved with active, coordinated sucking. Mom is more comfortable in this position.    Answered questions to FOB about timing of feeds, signs of satiation, cluster feeding, how to know how much baby is getting, etc.     Ed. On output and refer back to booklets.    Enc. To call lactation for support.     Education of breastfeeding with large breasts. Demonstration and teach back of positioning and alignment. Use pillows, tables, rolled towels/blankets to lift breast. Lift baby up to breast level. Education on hand expression prior to latch, positioning of hand to compress the breast, and positioning and alignment of baby for deep latch with large breasts.     Provided demonstration, education and support of deep latch to breast by placing the nipple to the nose, dragging down to chin to achieve a wide latch. Bring baby to the breast, not breast to baby. Move your shoulders down and away from your ears. Look for ear, shoulder, hip alignment. Baby's upper and lower lip should be flanged on the breast.    Education on creating a snug hold of your infant to the breast by verifying the infant's cheek is touching the breast, your infant's chin  "is deep into the breast tissue, your infant's arms are \"hugging\" the breast, and your infant's lips are flanged on the areola. Bring infant to the breast, not your breast to the infant. Latch should feel like a tugging sensation on the nipple.    Demonstrated with teach back breast compressions during a feeding to increase milk transfer and stimulate suckling after a breathing/muscle break.     Discussed with MOB how to utilize feeding log & monitor baby's output. Education on signs of satiation during a feeding, size of baby's belly, and offering both breasts at every feeding session provided.    All babies are born to breastfeed due to upturned nose and flared nostrils. Mom will always see tip of nose. Babies will unlatch when they can't breathe.     - Start feedings on breast that last feeding ended   - allow no more than 3 hours between breast feeding sessions   - time between feedings is counted from the beginning of the first feed to the beginning of the next feeding session    Reviewed early signs of hunger, including tensing of hands and shoulders - no need to wait for open eyes.  Crying is a late hunger sign.  If baby is crying, soothe baby first and then attempt to latch.  Reviewed normal sucking patterns: transition from stimulation to nutritive to release or non-nutritive. The goal is to see and hear lots of swallowing.  Reviewed normal nursing pattern: infant could latch on one breast up to 30 minutes or until releases on own. Signs of satiation is open hand with fingers that do not grab your finger.  Discussed difference in sensation of non-nutritive v nutritive sucking    Mom is encouraged to meet early feeding cues, allow for non-nutritive suck, use breast compressions, and monitor baby's output. Mom wants to review feeding logs to verify if baby is meeting daily output of wet and soiled diapers.     Nurse on demand: when baby gives hunger cues; when your breasts feel full, or at least every 3 hours " "during the day and every 5 hours at night counting from the beginning of one feeding to the beginning of the next; which ever comes first. When sucking and swallowing slow, gently compress the breast to restart flow. If active suck-swallow does not restart, gently remove the baby and offer the other breast; offering up to \"four\" breasts per feeding.    Discussed 2nd night syndrome and ways to calm infant. Hand out given. Information on hand expression given. Discussed benefits of knowing how to manually express breast including stimulating milk supply, softening nipple for latch and evacuating breast in the event of engorgement.    "

## 2024-02-28 NOTE — PROGRESS NOTES
Obstetrics Progress Note  Elizabeth Bear 28 y.o. female MRN: [id number]  Unit/Bed#: -01 Encounter: [id number]    Assessment/Plan:    Postpartum Day #1 s/p , currently stable. Baby in room. By issue:    *  (spontaneous vaginal delivery)  Assessment & Plan       Continue routine post partum care  Pain well controlled: tylenol/motrin scheduled  Lochia within normal limits: continue to monitor   OOB: as able, encourage ambulation  Passing flatus  Voiding spontaneously  Breastfeeding  Baby in: room  Dispo: anticipate d/c home PPD2      Gestational hypertension, third trimester  Assessment & Plan  Admission CBC, CMP wnl   Urine PC 0.12  Systolic (12hrs), Av , Min:106 , Max:143   Diastolic (12hrs), Av, Min:56, Max:86  Asymptomatic at this time  Continue to monitor    Rh negative state in antepartum period, third trimester  Assessment & Plan  Rhogam panel postpartum    Asthma  Assessment & Plan  Continue home albuterol  Avoid hemabate        Subjective/Objective     Subjective:   No acute events overnight. Pain: controlled. Tolerating PO: yes. Voiding: spontaneously. Flatus: passing. Ambulating: yes. Chest pain: no. Shortness of breath: no. Leg pain: no. Lochia: within normal limits. Baby in room, feeding via breast.    Objective:   Vitals:   Temp:  [97.7 °F (36.5 °C)-98.5 °F (36.9 °C)] 98.1 °F (36.7 °C)  HR:  [] 77  Resp:  [16-20] 16  BP: (106-145)/(56-92) 118/70       Intake/Output Summary (Last 24 hours) at 2024 0615  Last data filed at 2024 0501  Gross per 24 hour   Intake 1481.41 ml   Output 2096 ml   Net -614.59 ml       Lab Results   Component Value Date    WBC 12.96 (H) 2024    HGB 11.8 2024    HCT 34.7 (L) 2024    MCV 93 2024     2024       Physical Exam:   General: alert and oriented x3, in no apparent distress  Cardiovascular: regular rate  Pulmonary: normal effort  Abdomen: Soft, non-tender, non-distended, no rebound or guarding.  Uterine fundus firm and non-tender, at the umbilicus  Extremities: Non tender with no edema      Zofia Felton MD  PGY-I, OBGYN  2/28/2024, 6:15 AM

## 2024-02-29 VITALS
RESPIRATION RATE: 20 BRPM | SYSTOLIC BLOOD PRESSURE: 121 MMHG | HEIGHT: 67 IN | DIASTOLIC BLOOD PRESSURE: 75 MMHG | TEMPERATURE: 98.5 F | OXYGEN SATURATION: 99 % | BODY MASS INDEX: 40.49 KG/M2 | HEART RATE: 96 BPM | WEIGHT: 258 LBS

## 2024-02-29 PROCEDURE — 99024 POSTOP FOLLOW-UP VISIT: CPT | Performed by: STUDENT IN AN ORGANIZED HEALTH CARE EDUCATION/TRAINING PROGRAM

## 2024-02-29 RX ORDER — ACETAMINOPHEN 325 MG/1
650 TABLET ORAL EVERY 4 HOURS PRN
Start: 2024-02-29

## 2024-02-29 RX ORDER — IBUPROFEN 600 MG/1
600 TABLET ORAL EVERY 6 HOURS
Start: 2024-02-29

## 2024-02-29 RX ADMIN — IBUPROFEN 600 MG: 600 TABLET, FILM COATED ORAL at 05:07

## 2024-02-29 RX ADMIN — IBUPROFEN 600 MG: 600 TABLET, FILM COATED ORAL at 13:51

## 2024-02-29 RX ADMIN — ACETAMINOPHEN 650 MG: 325 TABLET, FILM COATED ORAL at 13:51

## 2024-02-29 RX ADMIN — SENNOSIDES 8.6 MG: 8.6 TABLET, FILM COATED ORAL at 10:01

## 2024-02-29 NOTE — LACTATION NOTE
"CONSULT - LACTATION  Elizabeth Bear 28 y.o. female MRN: [id number]    [facility] AN L&D Room / Bed:  319/ 319-01 Encounter: [id number]    Maternal Information     MOTHER:  N/A  Maternal Age: This patient's mother is not on file.  OB History: This patient's mother is not on file.  Previouse breast reduction surgery? {YES/NO:20926}    Lactation history:   Has patient previously breast fed: No   How long had patient previously breast fed:     Previous breast feeding complications:     This patient's mother is not on file.    Birth information:  YOB: 1995   Time of birth:     Sex: female   Delivery type:     Birth Weight: No birth weight on file.   Percent of Weight Change: Birth weight not on file     Gestational Age: <None>   [unfilled]    Assessment     Breast and nipple assessment: { Lactation breast assessment:42782::\"normal assessment\"}    [place] Assessment: { LACTATION NBRN ASSESS:86538::\"normal assessment\"}    Feeding assessment: { LACTATION FEEDING ASSESS:80398::\"feeding well\"}  LATCH:  Latch: Grasps breast, tongue down, lips flanged, rhythmic sucking   Audible Swallowing: Spontaneous and intermittent (24 hours old)   Type of Nipple: Everted (After stimulation)   Comfort (Breast/Nipple): Soft/non-tender   Hold (Positioning): No assist from staff, mother able to position/hold infant   LATCH Score: 10          Feeding recommendations:  { LACTATION FEEDING RECOMMENDATIONS:97789::\"breast feed on demand\"}    Christy Hume, MA 2024 1:43 PM  "

## 2024-02-29 NOTE — PLAN OF CARE
Problem: PAIN - ADULT  Goal: Verbalizes/displays adequate comfort level or baseline comfort level  Description: Interventions:  - Encourage patient to monitor pain and request assistance  - Assess pain using appropriate pain scale  - Administer analgesics based on type and severity of pain and evaluate response  - Implement non-pharmacological measures as appropriate and evaluate response  - Consider cultural and social influences on pain and pain management  - Notify physician/advanced practitioner if interventions unsuccessful or patient reports new pain  Outcome: Completed     Problem: INFECTION - ADULT  Goal: Absence or prevention of progression during hospitalization  Description: INTERVENTIONS:  - Assess and monitor for signs and symptoms of infection  - Monitor lab/diagnostic results  - Monitor all insertion sites, i.e. indwelling lines, tubes, and drains  - Monitor endotracheal if appropriate and nasal secretions for changes in amount and color  - [place] appropriate cooling/warming therapies per order  - Administer medications as ordered  - Instruct and encourage patient and family to use good hand hygiene technique  - Identify and instruct in appropriate isolation precautions for identified infection/condition  Outcome: Completed  Goal: Absence of fever/infection during neutropenic period  Description: INTERVENTIONS:  - Monitor WBC    Outcome: Completed     Problem: SAFETY ADULT  Goal: Patient will remain free of falls  Description: INTERVENTIONS:  - Educate patient/family on patient safety including physical limitations  - Instruct patient to call for assistance with activity   - Consult OT/PT to assist with strengthening/mobility   - Keep Call bell within reach  - Keep bed low and locked with side rails adjusted as appropriate  - Keep care items and personal belongings within reach  - Initiate and maintain comfort rounds  Outcome: Completed  Goal: Maintain or return to baseline ADL  function  Description: INTERVENTIONS:  -  Assess patient's ability to carry out ADLs; assess patient's baseline for ADL function and identify physical deficits which impact ability to perform ADLs (bathing, care of mouth/teeth, toileting, grooming, dressing, etc.)  - Assess/evaluate cause of self-care deficits   - Assess range of motion  - Assess patient's mobility; develop plan if impaired  - Assess patient's need for assistive devices and provide as appropriate  - Encourage maximum independence but intervene and supervise when necessary  - Involve family in performance of ADLs  - Assess for home care needs following discharge   - Consider OT consult to assist with ADL evaluation and planning for discharge  - Provide patient education as appropriate  Outcome: Completed  Goal: Maintains/Returns to pre admission functional level  Description: INTERVENTIONS:  - Perform AM-PAC 6 Click Basic Mobility/ Daily Activity assessment daily.  - Set and communicate daily mobility goal to care team and patient/family/caregiver.   - Collaborate with rehabilitation services on mobility goals if consulted  - Out of bed for toileting  - Record patient progress and toleration of activity level   Outcome: Completed     Problem: Knowledge Deficit  Goal: Patient/family/caregiver demonstrates understanding of disease process, treatment plan, medications, and discharge instructions  Description: Complete learning assessment and assess knowledge base.  Interventions:  - Provide teaching at level of understanding  - Provide teaching via preferred learning methods  Outcome: Completed  Goal: Verbalizes understanding of labor plan  Description: Assess patient/family/caregiver's baseline knowledge level and ability to understand information.  Provide education via patient/family/caregiver's preferred learning method at appropriate level of understanding.     1. Provide teaching at level of understanding.  2. Provide teaching via preferred  learning method(s).  Outcome: Completed     Problem: DISCHARGE PLANNING  Goal: Discharge to home or other facility with appropriate resources  Description: INTERVENTIONS:  - Identify barriers to discharge w/patient and caregiver  - Arrange for needed discharge resources and transportation as appropriate  - Identify discharge learning needs (meds, wound care, etc.)  - Arrange for interpretive services to assist at discharge as needed  - Refer to Case Management Department for coordinating discharge planning if the patient needs post-hospital services based on physician/advanced practitioner order or complex needs related to functional status, cognitive ability, or social support system  Outcome: Completed     Problem: Labor & Delivery  Goal: Manages discomfort  Description: Assess and monitor for signs and symptoms of discomfort.  Assess patient's pain level regularly and per hospital policy.  Administer medications as ordered. Support use of nonpharmacological methods to help control pain such as distraction, imagery, relaxation, and application of heat and cold.  Collaborate with interdisciplinary team and patient to determine appropriate pain management plan.    1. Include patient in decisions related to comfort.  2. Offer non-pharmacological pain management interventions.  3. Report ineffective pain management to physician.  Outcome: Completed  Goal: Patient vital signs are stable  Description: 1. Assess vital signs - vaginal delivery.  Outcome: Completed     Problem: POSTPARTUM  Goal: Experiences normal postpartum course  Description: INTERVENTIONS:  - Monitor maternal vital signs  - Assess uterine involution and lochia  Outcome: Completed  Goal: Appropriate maternal -  bonding  Description: INTERVENTIONS:  - Identify family support  - Assess for appropriate maternal/infant bonding   -Encourage maternal/infant bonding opportunities  - Referral to  or  as needed  Outcome:  Completed  Goal: Establishment of infant feeding pattern  Description: INTERVENTIONS:  - Assess breast/bottle feeding  - Refer to lactation as needed  Outcome: Completed

## 2024-02-29 NOTE — LACTATION NOTE
This note was copied from a baby's chart.  Discharge Lactation: parents called for feeding. Mom states last feeding in the am was deep latch and long feeding session.     Mom wants to attempt the laid back position. Demonstration and teach back of breast compressions, and how to reduce swelling of the areola. Used pillows and rolled blankets to lift the arm and the breast. Once mom can see the latch, alignment of nipple to nose. Deep latch achieved with assistance, active, coordinated sucking.     Mom wants to attempt the side lying position. Deep latch achieved on both breasts. FOB and mom latched baby on the left breast by themselves. [place] and encouragement provided.     Called baby and me and left vm to call    Reviewed what to expect at home.     D/c reviewed    Education on positioning and alignment. Mom is encouraged to:     - Bring baby up to the breast (use of pillows to elevate so baby's torso is against mom's breasts)   - Skin to skin for feedings with top hand exposed to show signs of satiation   - Chin deep into breast tissue (make baby look up to the nipple)   - nose aligned to the nipple   -Wait for wide gape, drag chin on the breast so nipple is aimed at the upper, back palate  - Cheek should be touching breast   - Deep, firm hold of baby with ear, shoulder, hip alignment    Provided demonstration, education and support of deep latch to breast by placing the nipple to the nose, dragging down to chin to achieve a wide latch. Bring baby to the breast, not breast to baby. Move your shoulders down and away from your ears. Look for ear, shoulder, hip alignment. Baby's upper and lower lip should be flanged on the breast.    Reviewed early signs of hunger, including tensing of hands and shoulders - no need to wait for open eyes.  Crying is a late hunger sign.  If baby is crying, soothe baby first and then attempt to latch.  Reviewed normal sucking patterns: transition from stimulation to nutritive to  release or non-nutritive. The goal is to see and hear lots of swallowing.  Reviewed normal nursing pattern: infant could latch on one breast up to 30 minutes or until releases on own. Signs of satiation is open hand with fingers that do not grab your finger.  Discussed difference in sensation of non-nutritive v nutritive sucking    - Start feedings on breast that last feeding ended   - allow no more than 3 hours between breast feeding sessions   - time between feedings is counted from the beginning of the first feed to the beginning of the next feeding session      Education of breastfeeding with large breasts. Demonstration and teach back of positioning and alignment. Use pillows, tables, rolled towels/blankets to lift breast. Lift baby up to breast level. Education on hand expression prior to latch, positioning of hand to compress the breast, and positioning and alignment of baby for deep latch with large breasts.     Provided education on growth spurts, when to introduce bottles; paced bottle feeding, and non-nutritive suck at the breast. Provided education on Signs of satiation. Encouraged to call lactation to observe a latch prior to discharge for reassurance. Encouraged to call baby and me with any questions and closely monitor output.      .

## 2024-02-29 NOTE — UTILIZATION REVIEW
"Mother and baby discharged on 2024      NOTIFICATION OF INPATIENT ADMISSION   MATERNITY/DELIVERY AUTHORIZATION REQUEST   SERVICING FACILITY:   [facility] - L&D, , NICU  [address] [place] [address]  Tax ID: [id number]  NPI: [id number]   ATTENDING PROVIDER:  Attending Name and NPI#: Reema Johansen Md [[id number]]  Address: [address] [place] [address]  Phone: [phone]   ADMISSION INFORMATION:  Place of Service: Inpatient [facility]  Place of Service Code: 21  Inpatient Admission Date/Time: 24  7:23 PM  Discharge Date/Time: 2024  3:42 PM  Admitting Diagnosis Code/Description:  Encounter for induction of labor [Z34.90]  Encounter for full-term uncomplicated delivery [O80]     Mother: Elizabeth Bear 1995 Estimated Date of Delivery: 3/18/24  Delivering clinician: Reema Johansen    OB History          2    Para   1    Term   1            AB   1    Living   1         SAB   0    IAB        Ectopic   1    Multiple   0    Live Births   1           Obstetric Comments   3/8/2022 Fluctuating beta HCG, resolved after ovarian cystectomy of hemorrhagic corpus luteal cyst and D+C             Henry Name & MRN:   Information for the patient's :  Chema, Baby Boy (Elizabeth) [[id number]]    Delivery Information:  Sex: male  Delivered 2024 10:10 PM by Vaginal, Spontaneous; Gestational Age: 37w1d    Henry Measurements:  Weight: 6 lb 7 oz (2920 g);  Height: 19.25\"    APGAR 1 minute 5 minutes 10 minutes   Totals: 9 9       UTILIZATION REVIEW CONTACT:  Cherelle Trujillo Utilization   Network Utilization Review Department  Phone: [phone]  Fax [phone]  Email: [email or web address]  Contact for approvals/pending authorizations, clinical reviews, and discharge.     PHYSICIAN ADVISORY SERVICES:  Medical Necessity Denial & Mock-qa-Ltri Review  Phone: [phone]  " Fax: [phone]  Email: [email or web address]     DISCHARGE SUPPORT TEAM:  For Patients Discharge Needs & Updates  Phone: [phone] opt. 2 Fax: [phone]  Email: [email or web address]

## 2024-02-29 NOTE — PLAN OF CARE
Problem: PAIN - ADULT  Goal: Verbalizes/displays adequate comfort level or baseline comfort level  Description: Interventions:  - Encourage patient to monitor pain and request assistance  - Assess pain using appropriate pain scale  - Administer analgesics based on type and severity of pain and evaluate response  - Implement non-pharmacological measures as appropriate and evaluate response  - Consider cultural and social influences on pain and pain management  - Notify physician/advanced practitioner if interventions unsuccessful or patient reports new pain  Outcome: Progressing     Problem: INFECTION - ADULT  Goal: Absence or prevention of progression during hospitalization  Description: INTERVENTIONS:  - Assess and monitor for signs and symptoms of infection  - Monitor lab/diagnostic results  - Monitor all insertion sites, i.e. indwelling lines, tubes, and drains  - Monitor endotracheal if appropriate and nasal secretions for changes in amount and color  - [place] appropriate cooling/warming therapies per order  - Administer medications as ordered  - Instruct and encourage patient and family to use good hand hygiene technique  - Identify and instruct in appropriate isolation precautions for identified infection/condition  Outcome: Progressing  Goal: Absence of fever/infection during neutropenic period  Description: INTERVENTIONS:  - Monitor WBC    Outcome: Progressing     Problem: SAFETY ADULT  Goal: Patient will remain free of falls  Description: INTERVENTIONS:  - Educate patient/family on patient safety including physical limitations  - Instruct patient to call for assistance with activity   - Consult OT/PT to assist with strengthening/mobility   - Keep Call bell within reach  - Keep bed low and locked with side rails adjusted as appropriate  - Keep care items and personal belongings within reach  - Initiate and maintain comfort rounds  - Make Fall Risk Sign visible to staff- Apply yellow socks and bracelet  for high fall risk patients  - Consider moving patient to room near nurses station  Outcome: Progressing  Goal: Maintain or return to baseline ADL function  Description: INTERVENTIONS:  -  Assess patient's ability to carry out ADLs; assess patient's baseline for ADL function and identify physical deficits which impact ability to perform ADLs (bathing, care of mouth/teeth, toileting, grooming, dressing, etc.)  - Assess/evaluate cause of self-care deficits   - Assess range of motion  - Assess patient's mobility; develop plan if impaired  - Assess patient's need for assistive devices and provide as appropriate  - Encourage maximum independence but intervene and supervise when necessary  - Involve family in performance of ADLs  - Assess for home care needs following discharge   - Consider OT consult to assist with ADL evaluation and planning for discharge  - Provide patient education as appropriate  Outcome: Progressing  Goal: Maintains/Returns to pre admission functional level  Description: INTERVENTIONS:  - Perform AM-PAC 6 Click Basic Mobility/ Daily Activity assessment daily.  - Set and communicate daily mobility goal to care team and patient/family/caregiver.   - Collaborate with rehabilitation services on mobility goals if consulted  - Out of bed for toileting  - Record patient progress and toleration of activity level   Outcome: Progressing     Problem: Knowledge Deficit  Goal: Patient/family/caregiver demonstrates understanding of disease process, treatment plan, medications, and discharge instructions  Description: Complete learning assessment and assess knowledge base.  Interventions:  - Provide teaching at level of understanding  - Provide teaching via preferred learning methods  Outcome: Progressing  Goal: Verbalizes understanding of labor plan  Description: Assess patient/family/caregiver's baseline knowledge level and ability to understand information.  Provide education via patient/family/caregiver's  preferred learning method at appropriate level of understanding.     1. Provide teaching at level of understanding.  2. Provide teaching via preferred learning method(s).  Outcome: Progressing     Problem: DISCHARGE PLANNING  Goal: Discharge to home or other facility with appropriate resources  Description: INTERVENTIONS:  - Identify barriers to discharge w/patient and caregiver  - Arrange for needed discharge resources and transportation as appropriate  - Identify discharge learning needs (meds, wound care, etc.)  - Arrange for interpretive services to assist at discharge as needed  - Refer to Case Management Department for coordinating discharge planning if the patient needs post-hospital services based on physician/advanced practitioner order or complex needs related to functional status, cognitive ability, or social support system  Outcome: Progressing     Problem: Labor & Delivery  Goal: Manages discomfort  Description: Assess and monitor for signs and symptoms of discomfort.  Assess patient's pain level regularly and per hospital policy.  Administer medications as ordered. Support use of nonpharmacological methods to help control pain such as distraction, imagery, relaxation, and application of heat and cold.  Collaborate with interdisciplinary team and patient to determine appropriate pain management plan.    1. Include patient in decisions related to comfort.  2. Offer non-pharmacological pain management interventions.  3. Report ineffective pain management to physician.  Outcome: Progressing  Goal: Patient vital signs are stable  Description: 1. Assess vital signs - vaginal delivery.  Outcome: Progressing     Problem: POSTPARTUM  Goal: Experiences normal postpartum course  Description: INTERVENTIONS:  - Monitor maternal vital signs  - Assess uterine involution and lochia  Outcome: Progressing  Goal: Appropriate maternal -  bonding  Description: INTERVENTIONS:  - Identify family support  - Assess for  appropriate maternal/infant bonding   -Encourage maternal/infant bonding opportunities  - Referral to  or  as needed  Outcome: Progressing  Goal: Establishment of infant feeding pattern  Description: INTERVENTIONS:  - Assess breast/bottle feeding  - Refer to lactation as needed  Outcome: Progressing  Goal: Incision(s), wounds(s) or drain site(s) healing without S/S of infection  Description: INTERVENTIONS  - Assess and document dressing, incision, wound bed, drain sites and surrounding tissue  - Provide patient and family education  Outcome: Progressing

## 2024-02-29 NOTE — PROGRESS NOTES
Obstetrics Progress Note  Elizabeth Bear 28 y.o. female MRN: [id number]  Unit/Bed#: -01 Encounter: [id number]    Assessment/Plan:    Postpartum Day #2 s/p , currently stable. Baby in room. By issue:    *  (spontaneous vaginal delivery)  Assessment & Plan       Continue routine post partum care  Pain well controlled: tylenol/motrin scheduled  Lochia within normal limits: continue to monitor   OOB: as able, encourage ambulation  Passing flatus  Voiding spontaneously  Breastfeeding  Baby in: room  Dispo: anticipate d/c home PPD2      Gestational hypertension, third trimester  Assessment & Plan  Admission CBC, CMP wnl   Urine PC 0.12  Systolic (12hrs), Av , Min:115 , Max:119   Diastolic (12hrs), Av, Min:56, Max:74  Asymptomatic at this time  Continue to monitor    Rh negative state in antepartum period, third trimester  Assessment & Plan  Rhogam panel postpartum    Asthma  Assessment & Plan  Continue home albuterol  Avoid hemabate        Subjective/Objective     Subjective:   No acute events overnight. Pain: controlled. Tolerating PO: yes. Voiding: spontaneously. Flatus: passing. Ambulating: yes. Chest pain: no. Shortness of breath: no. Leg pain: no. Lochia: within normal limits. Baby in room, feeding via breast.    Objective:   Vitals:   Temp:  [97.7 °F (36.5 °C)-98.3 °F (36.8 °C)] 97.7 °F (36.5 °C)  HR:  [80-85] 85  Resp:  [16-20] 18  BP: (115-127)/(56-81) 115/74     No intake or output data in the 24 hours ending 24 0653    Lab Results   Component Value Date    WBC 12.96 (H) 2024    HGB 11.8 2024    HCT 34.7 (L) 2024    MCV 93 2024     2024       Physical Exam:   General: alert and oriented x3, in no apparent distress  Cardiovascular: regular rate  Pulmonary: normal effort  Abdomen: Soft, non-tender, non-distended, no rebound or guarding. Uterine fundus firm and non-tender, at the umbilicus  Extremities: Non tender with no edema      Zofia LOVE  MD Jose Francisco  PGY-I, OBN  2/29/2024, 6:53 AM

## 2024-03-04 PROCEDURE — 88307 TISSUE EXAM BY PATHOLOGIST: CPT | Performed by: PATHOLOGY

## 2024-03-06 ENCOUNTER — OFFICE VISIT (OUTPATIENT)
Dept: POSTPARTUM | Facility: CLINIC | Age: 29
End: 2024-03-06

## 2024-03-06 ENCOUNTER — OFFICE VISIT (OUTPATIENT)
Dept: OBGYN CLINIC | Facility: CLINIC | Age: 29
End: 2024-03-06

## 2024-03-06 ENCOUNTER — TELEPHONE (OUTPATIENT)
Dept: POSTPARTUM | Facility: CLINIC | Age: 29
End: 2024-03-06

## 2024-03-06 VITALS — SYSTOLIC BLOOD PRESSURE: 118 MMHG | DIASTOLIC BLOOD PRESSURE: 86 MMHG

## 2024-03-06 VITALS — WEIGHT: 240 LBS | SYSTOLIC BLOOD PRESSURE: 122 MMHG | BODY MASS INDEX: 37.59 KG/M2 | DIASTOLIC BLOOD PRESSURE: 86 MMHG

## 2024-03-06 PROBLEM — Z3A.37 37 WEEKS GESTATION OF PREGNANCY: Status: RESOLVED | Noted: 2023-09-10 | Resolved: 2024-03-06
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
## 2024-03-06 PROBLEM — O99.810 ABNORMAL GLUCOSE AFFECTING PREGNANCY: Status: RESOLVED | Noted: 2023-09-07 | Resolved: 2024-03-06

## 2024-03-06 PROBLEM — O99.891 BACK PAIN AFFECTING PREGNANCY IN THIRD TRIMESTER: Status: RESOLVED | Noted: 2023-10-04 | Resolved: 2024-03-06

## 2024-03-06 PROBLEM — B37.31 VULVOVAGINAL CANDIDIASIS: Status: RESOLVED | Noted: 2023-09-29 | Resolved: 2024-03-06

## 2024-03-06 PROBLEM — R06.02 SHORTNESS OF BREATH DURING PREGNANCY: Status: RESOLVED | Noted: 2024-02-10 | Resolved: 2024-03-06

## 2024-03-06 PROBLEM — O99.891 SHORTNESS OF BREATH DURING PREGNANCY: Status: RESOLVED | Noted: 2024-02-10 | Resolved: 2024-03-06

## 2024-03-06 PROBLEM — Z34.90 SUPERVISION OF NORMAL PREGNANCY: Status: RESOLVED | Noted: 2023-11-26 | Resolved: 2024-03-06
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
## 2024-03-06 PROBLEM — O99.213 OBESITY AFFECTING PREGNANCY IN THIRD TRIMESTER: Status: RESOLVED | Noted: 2023-09-07 | Resolved: 2024-03-06

## 2024-03-06 PROBLEM — M54.9 BACK PAIN AFFECTING PREGNANCY IN THIRD TRIMESTER: Status: RESOLVED | Noted: 2023-10-04 | Resolved: 2024-03-06

## 2024-03-06 PROBLEM — R03.0 ELEVATED BP WITHOUT DIAGNOSIS OF HYPERTENSION: Status: RESOLVED | Noted: 2023-11-14 | Resolved: 2024-03-06

## 2024-03-06 PROCEDURE — 99024 POSTOP FOLLOW-UP VISIT: CPT | Performed by: STUDENT IN AN ORGANIZED HEALTH CARE EDUCATION/TRAINING PROGRAM

## 2024-03-06 NOTE — TELEPHONE ENCOUNTER
Elizabeth called - said she forgot to ask while here - she is asking about pacifiers.  She was previously told not to use due to nipple confusion - but she's wondering if it might help Francesco (8d) with his sucking.  She would like to see what your thoughts / recommendations might be on this question.     She said can either call or send [employer] message - which ever works best for you.

## 2024-03-06 NOTE — TELEPHONE ENCOUNTER
"I reviewed with Elizabeth that the concern with pacifiers with an infant like Francesco is not necessarily \"nipple confusion\" but concerns that we can \"fool\" a hungry baby into sleeping with a pacifier.  I suggested that she assures that Francesco is well fed first before offering a pacifier. I encouraged her to use a pacifier that encourages Francesco to open his mouth wide and flange his lips as he sucks.  "

## 2024-03-06 NOTE — PROGRESS NOTES
INITIAL BREAST FEEDING EVALUATION    Informant/Relationship: Elizabeth and Zeus LOPEZ    Discussion of General Lactation Issues: Francesco was latching while in the hospital with help from the IBCLC but Elizabeth has never successfully achieved a latch on her own. After discharge, Francesco lost weight so Elizabeth began pumping and bottle feeding.  She desires to directly breastfeed.    Infant is 8 days old today.        History:  Fertility Problem:yes - had a twin pregnancy loss (one twin was ectopic) prior to conceiving this pregnancy.  Took 18 months to conceive this pregnancy.  Underwent testing without any diagnosis found.  Conceived naturally.  Breast changes:yes - breasts were larger and tender, areolas were larger and darker  : yes - induced due to some high BP's throughout the pregnancy  Full term: 37 1/7 weeks   labor:no  First nursing/attempt < 1 hour after birth:yes - attempted during STS in the delivery room.  Baby was positioned at the breast by hospital staff.  Skin to skin following delivery:yes - Francesco was positioned on Elizabeth's chest at delivery  Breast changes after delivery:yes - breasts are jensen.  Milk can in by day 4  Rooming in (infant in room with mother with exception of procedures, eg. Circumcision:  yes  Blood sugar issues:no  NICU stay:no  Jaundice: yes  Phototherapy:no  Supplement given: (list supplement and method used as well as reason(s):no    Past Medical History:   Diagnosis Date    Abnormal Pap smear of cervix 2023    ASCUS HPV other    Allergic rhinitis     Anxiety     Asthma     Cyst of right ovary 2022    Depression     well managed with therapy    Ectopic pregnancy 2022    Questional cyst vs ectopic    Endometriosis     Intractable migraine with status migrainosus 10/08/2022    Miscarriage 2022    POTS (postural orthostatic tachycardia syndrome)     PTSD (post-traumatic stress disorder)     S/P D&C (status post dilation and  curettage) 03/13/2022    S/P ovarian cystectomy 03/13/2022         Current Outpatient Medications:     acetaminophen (TYLENOL) 325 mg tablet, Take 2 tablets (650 mg total) by mouth every 4 (four) hours as needed for mild pain, Disp: , Rfl:     albuterol (2.5 mg/3 mL) 0.083 % nebulizer solution, Take 3 mL (2.5 mg total) by nebulization every 6 (six) hours as needed for wheezing or shortness of breath, Disp: 90 mL, Rfl: 0    albuterol (ProAir HFA) 90 mcg/act inhaler, Inhale 2 puffs every 6 (six) hours as needed for wheezing, Disp: 8.5 g, Rfl: 0    benzocaine-menthol-lanolin-aloe (DERMOPLAST) 20-0.5 % topical spray, Apply 1 Application topically every 6 (six) hours as needed for irritation or mild pain, Disp: , Rfl:     Blood Glucose Monitoring Suppl (Contour Next Gen Monitor) w/Device KIT, Per insurance formulary. GDM (Patient not taking: Reported on 1/19/2024), Disp: 1 kit, Rfl: 0    Contour Next Test test strip, Test 4 times a day. GDM (Patient not taking: Reported on 1/19/2024), Disp: 100 strip, Rfl: 0    Elastic Bandages & Supports (Abdominal Binder/Elastic XL) MISC, Use if needed (back pain) Pregnancy belly band, Disp: 1 each, Rfl: 0    Elastic Bandages & Supports (Wrist Splint/Left Medium) MISC, Use daily at bedtime, Disp: 1 each, Rfl: 0    hydrocortisone 2.5 % cream, Apply topically 2 (two) times a day Apply pea sized amount twice daily to nipple for 10-14 days, Disp: 20 g, Rfl: 0    ibuprofen (MOTRIN) 600 mg tablet, Take 1 tablet (600 mg total) by mouth every 6 (six) hours, Disp: , Rfl:     magnesium oxide (MAG-OX) 400 mg tablet, Take 1 tablet (400 mg total) by mouth daily, Disp: 90 tablet, Rfl: 1    Microlet Lancets MISC, Use 4 a day. GDM (Patient not taking: Reported on 1/19/2024), Disp: 100 each, Rfl: 0    nystatin (MYCOSTATIN) powder, Apply topically 3 (three) times a day As needed, Disp: 30 g, Rfl: 0    Prenatal Vit-Fe Fumarate-FA (PRENATAL PO), Take by mouth, Disp: , Rfl:     witch hazel-glycerin (TUCKS)  "topical pad, Apply 1 Pad topically every 4 (four) hours as needed for irritation, Disp: , Rfl:     No Known Allergies    Social History     Substance and Sexual Activity   Drug Use Never       Social History Never a smoker    Interval Breastfeeding History:  Frequency of breast feeding: Attempting a couple of times a day  Does mother feel breastfeeding is effective: No  Does infant appear satisfied after nursing:No  Stooling pattern normal: Yes  Urinating frequently:Yes  Using shield or shells: No    Alternative/Artificial Feedings:   Bottle: Yes, at this time Francesco is exclusively bottle fed.  Using paced feeding technique  Cup: No  Syringe/Finger: No           Formula Type: Enfamil Neuro Pro                     Amount: 2-3 ounces when expressed milk is not available.  Needing formula less and less frequently as Elizabeth's milk production is established.            Breast Milk:                      Amount: 2-3 ounces            Frequency Q 2-3 Hr between feedings  Elimination Problems: No      Equipment:  Nipple Shield             Type: none             Size: n/a             Frequency of Use: n/a  Pump            Type: Spectra S2            Frequency of Use: Every 2-3 hours ATC.  Expresses 2-3 ounces per session and most recently expressed 4 ounces  Shells            Type: none            Frequency of use: n/a    Equipment Problems: yes Elizabeth is unsure if her flanges fit well    Mom:  Breast: Very large symmetrical breasts.  Rounded shape.  Closely spaced.  Very firm and full.  There is dependent edema in the lower quadrants and areolas making the tissue firm and \"stiff\".    Nipple Assessment in General: Everted nipples bilaterally.  Very small, shallow cracks on the face and shaft of both.  Mother's Awareness of Feeding Cues                 Recognizes: Yes                  Verbalizes: Yes  Support System: FOB, extended family  History of Breastfeeding: none  Changes/Stressors/Violence: Elizabeth is concerned " that Francesco does not latch or feed effectively at the breast.  Concerns/Goals: Elizabeth desires to exclusively breast milk feed.  She would like to directly breastfeed if possible    Problems with Mom: none    Physical Exam  Constitutional:       Appearance: Normal appearance.   HENT:      Head: Normocephalic and atraumatic.   Cardiovascular:      Rate and Rhythm: Normal rate and regular rhythm.      Pulses: Normal pulses.   Pulmonary:      Effort: Pulmonary effort is normal.      Breath sounds: Normal breath sounds.   Musculoskeletal:         General: Normal range of motion.      Cervical back: Normal range of motion.   Neurological:      Mental Status: She is alert and oriented to person, place, and time.   Skin:     General: Skin is warm and dry.   Psychiatric:         Mood and Affect: Mood normal.         Behavior: Behavior normal.         Thought Content: Thought content normal.         Judgment: Judgment normal.         Infant:  Behaviors: Sleepy  Color: Jaundice  Birth weight: 2920 grams  Current weight: 2690 grams    Problems with infant: LPTI, jaundice, weight loss, not latching or nursing effectively      General Appearance:  Alert, active, no distress                             Head:  Normocephalic, AFOF, sutures opposed                             Eyes:  Conjunctiva clear, no drainage                              Ears:  Normally placed, no anomolies                             Nose:  No drainage or erythema                           Mouth:  No lesions. Slightly narrow gape.  Tongue did not extend beyond the lower alveolar ridge, lateralized well and tip lifted without restriction.  Poor cupping with very little suction generated while he sucked on my finger.  Poor peristalsis.  The tongue did not extend over the lower alveolar ridge and Francesco bit down on my finger.  His upper lip curled under tightly  as he sucked.  Over time, cupping and suction improved but there was very little movement of the  "tongue as he sucked.  He just compressed my finger with his jaw.                    Neck:  Supple, symmetrical, trachea midline                 Respiratory:  No grunting, flaring, retractions, breath sounds clear and equal            Cardiovascular:  Regular rate and rhythm. No murmur. Adequate perfusion/capillary refill. Femoral pulse present                    Abdomen:   Soft, non-tender, no masses, bowel sounds present, no HSM             Genitourinary:  Normal male, testes descended, no discharge, swelling, or pain, anus patent                          Spine:   No abnormalities noted        Musculoskeletal:  Full range of motion          Skin/Hair/Nails:   Skin warm, dry, and intact, erythema toxicum on torso and extremities, jaundice to nipple line.                Neurologic:   No abnormal movement, tone appropriate for gestational age    [place] Latch:  Efficiency:               Lips Flanged: after manual adjustment when feeding on the bottle.  Flanged when briefly latched on the breast              Depth of latch: shallow, just on the tip of the nipple.  Was able to adjust to a deep latch on the bottle nipple.              Audible Swallow: only when bottle feeding but he only swallowed intermittently when bottle fed.  No sustained              Visible Milk: Yes, Roses breasts were leaking copiously during the attempt              Wide Open/ Asymmetrical: unmaintainable on the breast. Narrow gape on the bottle nipple initially              Suck Swallow Cycle: Breathing: unlabored, Coordinated: yes  Nipple Assessment after latch: Normal: elongated/eraser, no discoloration and no damage noted.  Latch Problems: Francesco attempted many times to latch on both breasts.  His mouth did not open wide enough to latch deeply on the breast.  This was compounded by the edema in Roses nipples which made her areolas firm and \"stiff\".  He would latch just onto the nipple, suck a few times and then release the " breast.  When he was offered a bottle, he latched just onto the end of the nipple, his lips were curled into his mouth.  He did not establish SSB, he just waited for the milk to drip into his mouth.    Position:  Infant's Ergonomics/Body               Body Alignment: Yes               Head Supported: Yes               Close to Mom's body/ Lifted/ Supported: Yes               Mom's Ergonomics/Body: Yes                           Supported: Yes                           Sitting Back: Yes                           Brings Baby to her breast: Yes  Positioning Problems: Elizabeth positioned Francesco effectively in cross cradle and football holds.  She just needed a little help with her hand positioning to more effectively compress her breast in alignment with Francesco's mouth      Handouts:   Engorgement, Paced bottle feeding, Hands on pumping, Hand expression, and Latch Check List    Education:  Reviewed Latch: Demonstrated how to gently compress the breast and align the baby so that his nose is just above the nipple with his lower lip and chin touching the breast to encourage the deepest, widest, off-center latch. Discussed why Francesco is having trouble latching deeply on the breast  Reviewed Positioning for Dyad: Demonstrated how to position mom comfortably and supported with her baby belly to belly prior to bringing her baby to her breast. Demonstrated biological positioning with a baby led latch  Reviewed Frequency/Supply & Demand: Discussed how milk production is established and   Reviewed Alternative/Artificial Feedings: Discussed and demonstrated paced bottle feeding  Reviewed Mom/Breast care: Discussed appropriate handling of the breasts to avoid inflammation, pain and injury  Reviewed Equipment: Discussed and demonstrated the use and features of the Spectra S2 and gentle hands on technique.  Discussed how to determine what size flange to use.      Plan:    Reassurance and support given.  I acknowledged Elizabeth's  concerns and her desire to breastfeed.  I explained that her baby is not ready to breastfeed yet but we will continue to support her towards that goal.  I encouraged lots of skin to skin and attempts at direct breastfeeding when she desires.  We worked on positioning to give her options that she feels comfortable and confident with when feeding at the breast.  I suggested that she pump every 3 hours during the day and every 4 hours at night to allow her more time to rest and enjoy her baby.  She was taught effective use of her pump and how to determine what size flange to use.  She was taught gentle techniques that prevent injury to the breast.  I recommended that she wear a more supportive bra and use lymphatic drainage techniques to resolve the dependent edema noted on today's exam. I explained the edema is making it even more difficult for Francesco to latch effectively.  A follow up appointment was scheduled to monitor progress.   I encouraged her to call with any questions or concerns.    I have spent 90 minutes with Patient and family today in which greater than 50% of this time was spent in counseling/coordination of care regarding Instructions for management, Patient and family education, and Counseling / Coordination of care.

## 2024-03-06 NOTE — PATIENT INSTRUCTIONS
"Continue to spend as much time as you can skin to skin with Francesco and attempt latch when you desire.   Continue pumping every 3 hours during the day and every 4 hours at night until effective breastfeeding is established.  When pumping, cycle your pump through stimulation and expression mode several times in a session to stimulate several let downs until you have expressed enough milk to feed the baby and to achieve breast comfort.  There is no need to \"empty\" the breast completely. Use gentle hands on pumping and hand expression   Maintain your pump as recommended. Use flange that fits comfortably and allows the breast to empty effectively.    To resolve the edema in your breasts and areolas, wear a bra that supports your breasts effectively. Use gentle lymphatic massage to help resolve he edema as well.  Softening the areolas and nipple will make latching effectively easier for Francesco.  Follow up as scheduled.  Please call with any questions or concerns.  "

## 2024-03-06 NOTE — PROGRESS NOTES
PP Visit and BP check    Feeling well overall, but with perineal pain and what feels like a bulge in the vagina.     Vitals:    03/06/24 1004   BP: 122/86     Gen: NAD  Pelvic: well healing perineal laceration, friable and tender to touch    A/P: PP visit, recovering appropriately  - reassured regarding healing  - BP ok today, f/u at routine PP visit

## 2024-03-12 ENCOUNTER — TELEPHONE (OUTPATIENT)
Dept: POSTPARTUM | Facility: CLINIC | Age: 29
End: 2024-03-12

## 2024-03-12 NOTE — TELEPHONE ENCOUNTER
Elizabeth called - was seen last week - has follow up next week - but having concerns with her supply at this time - she doesn't feel she can keep up with what Francesco (2w) needs.  She is pumping 3-4hrs as previously instructed - but not getting much - would like to discuss.

## 2024-03-12 NOTE — TELEPHONE ENCOUNTER
Elizabeth is exclusively pumping for her 2 weeks old, she is trying to latch his but has been unsuccessful.  She is pumping Q3hrs during the day and Q4hrs at night.      She yields 3-5oz when pumps q3 and  yields 4-6oz when pumps q4 hrs.     Baby feds about q1-1.5hrs and feds 1.5-2oz followed sometimes by another 2oz 30 minutes later.  She is keeping up with his needs but would like a little extra milk.     She has a Spectra S2 and is pumping one breast at a time, she uses gentle  massage and is using hands on pumping technique, she is appropriately cycling through the pump settings in response to the flow of her milk.  She was sized for flanges and does feel that they are appropriate. Her right nipple is a little sore and is cracked, she believes that this is from baby trying to latch baby and him biting down.  She denies any other breast pain, fever, chills or flu like symptoms and knows to call if these symptoms develop.      Reviewed moist would healing for nipple damage.  Apply organic coconut or olive oil, or lanolin/nipple cream to the nipple and cover with a small piece of wax/parcment paper.  Remove before feeding/pumping and apply new cream and paper after each feeding/pumping.    I encouraged her to consider a pumping bra so that she can pump both breasts at the same time, to maximize her time and milk production.  By saving time at each pumping session she may be able to add a session in daily as desires to achieve the 4-6 additional oz that she desires.  I encouraged her to continue to spend lots of time skin to skin with her baby and be sure that she is paced bottle feeding as not to over feed baby.      She has an appt scheduled for follow up, I encouraged her to call back with any questions or if she desires an earlier appt.     Elizabeth verbalizes understanding and denies any further questions at this time.

## 2024-03-17 NOTE — PROGRESS NOTES
I have reviewed the notes, assessments, and/or procedures performed by Carmela Chen RN, IBCLC, I concur with her/his documentation of Elizabeth Casillas MD 03/16/24

## 2024-03-18 ENCOUNTER — POSTPARTUM VISIT (OUTPATIENT)
Dept: OBGYN CLINIC | Facility: CLINIC | Age: 29
End: 2024-03-18

## 2024-03-18 ENCOUNTER — OFFICE VISIT (OUTPATIENT)
Dept: POSTPARTUM | Facility: CLINIC | Age: 29
End: 2024-03-18
Payer: COMMERCIAL

## 2024-03-18 VITALS
WEIGHT: 230.4 LBS | BODY MASS INDEX: 36.16 KG/M2 | DIASTOLIC BLOOD PRESSURE: 74 MMHG | SYSTOLIC BLOOD PRESSURE: 102 MMHG | HEIGHT: 67 IN

## 2024-03-18 VITALS — DIASTOLIC BLOOD PRESSURE: 76 MMHG | SYSTOLIC BLOOD PRESSURE: 110 MMHG

## 2024-03-18 PROCEDURE — 99024 POSTOP FOLLOW-UP VISIT: CPT | Performed by: OBSTETRICS & GYNECOLOGY

## 2024-03-18 PROCEDURE — 99404 PREV MED CNSL INDIV APPRX 60: CPT | Performed by: PEDIATRICS

## 2024-03-18 NOTE — PROGRESS NOTES
"BREAST FEEDING FOLLOW UP VISIT    Informant/Relationship: Elizabeth    Discussion of General Lactation Issues: Francesco continues to bite when he breastfeeds.  She does not feel he drinks effectively while at the breast.  Elizabeth continues to pump to provide milk for feedings.  Pumping typically goes well.  She had an \"off\" session last night after a day of using her portable pump.  In the middle of the night, she developed a firm tender area in her left breast.  She is feeling better after using heat and massage to work it out.    Infant is 2 weeks old today.    Interval Breastfeeding History:  Frequency of breast feeding: Attempting a couple of times a day most days.  Does mother feel breastfeeding is effective: No  Does infant appear satisfied after nursing:No  Stooling pattern normal: Yes  Urinating frequently:Yes  Using shield or shells: No     Alternative/Artificial Feedings:   Bottle: Yes, at this time Francesco is exclusively bottle fed.  Using paced feeding technique. He dribbles as he feeds and has vomited after feedings at times.  Cup: No  Syringe/Finger: No           Formula Type: Enfamil Neuro Pro                     Amount: none currently            Breast Milk:                      Amount:up to 6 ounces            Frequency Q 3-4 Hr between feedings  Elimination Problems: No        Equipment:  Nipple Shield             Type: none             Size: n/a             Frequency of Use: n/a  Pump            Type: Spectra S2 and Aster            Frequency of Use: Every 3-4 hours ATC.  Expresses 25-35 ounces a day  Shells            Type: none            Frequency of use: n/a     Equipment Problems: No     Mom:  Breast: Very large symmetrical breasts.  Rounded shape.  Closely spaced.  Very firm and full.  There dependent edema noted on the last exam has resolved.  Nipple Assessment in General: Everted nipples bilaterally.  Very small, shallow cracks on the face and shaft of both.  After Elizabeth removed her " bra for the exam, very quickly fluid filled the areolas around the nipple, making the areolas stiff.  Prior to the exam, the nipples and areolas were soft and supple.  Mother's Awareness of Feeding Cues                 Recognizes: Yes                  Verbalizes: Yes  Support System: FOB, extended family  History of Breastfeeding: none  Changes/Stressors/Violence: Elizabeth is concerned that Francesco does not latch or feed effectively at the breast.  Concerns/Goals: Elizabeth desires to exclusively breast milk feed.  She would like to directly breastfeed if possible     Problems with Mom: none    Physical Exam  Constitutional:       Appearance: Normal appearance.   Cardiovascular:      Rate and Rhythm: Normal rate.      Pulses: Normal pulses.      Heart sounds: Normal heart sounds.   Pulmonary:      Effort: Pulmonary effort is normal.      Breath sounds: Normal breath sounds.   Musculoskeletal:         General: Normal range of motion.      Cervical back: Normal range of motion and neck supple.   Neurological:      Mental Status: She is alert and oriented to person, place, and time.   Skin:     General: Skin is warm and dry.   Psychiatric:         Mood and Affect: Mood normal.         Behavior: Behavior normal.         Thought Content: Thought content normal.         Judgment: Judgment normal.         Infant:  Behaviors: Alert  Color: Pink  Birth weight: 2920 grams  Current weight: 3195 grams    Problems with infant: not latching or feeding effectively at the breast.    General Appearance:  Alert, active, no distress                             Head:  Normocephalic, AFOF, sutures opposed                             Eyes:  Conjunctiva clear, no drainage                              Ears:  Normally placed, no anomolies                             Nose:  No drainage or erythema                           Mouth:  No lesions. Slightly narrow gape.  Tongue did not extend beyond the lower alveolar ridge, lateralized well and  tip lifted without restriction.  Poor cupping with very little suction generated while he sucked on my finger.  Poor peristalsis.  The tongue did not extend over the lower alveolar ridge and Francesco bit down on my finger.  His upper lip curled under tightly  as he sucked. There is a thick, even white coating on the tongue.                             Neck:  Supple, symmetrical, trachea midline                 Respiratory:  No grunting, flaring, retractions, breath sounds clear and equal            Cardiovascular:  Regular rate and rhythm. No murmur. Adequate perfusion/capillary refill.                    Abdomen:   Soft, non-tender, no masses, bowel sounds present, no HSM             Genitourinary:  Normal male, testes descended, no discharge, swelling, or pain, anus patent                          Spine:   No abnormalities noted        Musculoskeletal:  Full range of motion          Skin/Hair/Nails:   Skin warm, dry, and intact, erythema toxicum on torso and extremities, jaundice to nipple line.                Neurologic:   No abnormal movement, tone appropriate for gestational age     Latch:  Efficiency:               Lips Flanged: when latched at the breast yes, needed to manually flanged when feeding from the bottle.               Depth of latch: shallow, just on the nipple when at the breast, shallow, just on the nipple until manually adjusted when bottle feeding.              Audible Swallow: none when latched at the breast, intermittently when feeding from the bottle              Visible Milk: Yes              Wide Open/ Asymmetrical: not while at the breast.  Wide after manual adjustment when feeding from the bottle              Suck Swallow Cycle: Breathing: unlabored, Coordinated: yes  Nipple Assessment after latch: Normal: elongated/eraser, no discoloration and no damage noted.  Latch Problems: Francesco attempted several times to latch.  He appeared interested and willing.  He was unable to open his  "mouth wide enough to grasp the areola in his mouth.  He latched only on the nipple.  The nipple telescoped in and out of his mouth as he sucked.  He would release the nipple within a few sucks. Part of his challenge may have been due to the edema that Elizabeth develops in the areolas around the base of her nipples when she removes her bra. When he became frustrated, the attempt was stopped and he was offered a bottle.  His lips needed to manually flanged on the treat and he needed to be encouraged to open his mouth wider to latch deeply.  He needed fairly fast flow to establish SSB.  Otherwise, 5-6 sucks were noted prior to each swallow.      Position:  Infant's Ergonomics/Body               Body Alignment: Yes               Head Supported: Yes               Close to Mom's body/ Lifted/ Supported: Yes               Mom's Ergonomics/Body: Yes                           Supported: Yes                           Sitting Back: Yes                           Brings Baby to her breast: Yes  Positioning Problems: Elizabeth positioned Francesco effectively in football hold with a pillow to support him and her breast.  She needed a little guidance with hand positioning for more effective shaping of her breast.      Handouts:   None    Education:  Reviewed Latch: Discussed Francesco's physical exam and potential reasons why he continues to struggle to latch and feed effectively from the breast and from a bottle.  Reviewed Mom/Breast care: Discussed appropriate handling of the breasts to avoid inflammation, pain and injury        Plan:    Reassurance and support given.  I acknowledged Elizabeth's concerns and her commitment to providing milk for her baby.  I encouraged her to continue with her current pumping routine.  I reviewed realistic expectations for milk production.  I recommended that she avoid aggressive massage of her breasts to treat \"blocked ducts\" as this can increase inflammation and pain.  I encouraged her to continue " to attempt direct breastfeeding as often as she feels comfortable.  An appointment was scheduled with Dr Casillas for more evaluation of Francesco's feeding issues and also to get more support for the dependent edema that is contributing to latch issues.   I encouraged Elizabeth to call with any questions or concerns.    I have spent 60 minutes with Patient and family today in which greater than 50% of this time was spent in counseling/coordination of care regarding Instructions for management, Patient and family education, and Counseling / Coordination of care.

## 2024-03-18 NOTE — PROGRESS NOTES
"Elizabeth Bear  1995    S:  28 y.o. female here for postpartum visit.  She is s/p Uncomplicated vaginal delivery on 2/28/24 .       Gender: male   Apgars: 9/9  Weight: 2920 g  Her lochia has resolved.  Clear mucous discharge,sometimes brown or red tinged.   She is Using breast pump without problems.   She is eating normally, denies constipation, diarrhea, urinary dysfunction.   She denies postpartum blues/depression.  Her EPDS is 0.      Normal urination and BM  Last Pap: 1/6/23, ASCUS, HPV positive    We discussed contraceptive options in detail including birth control pills, patches, NuvaRing, DepoProvera, Mirena/Kyleena IUD, Nexplanon in detail.  At this point she plans to use condoms.     O:   /74 (BP Location: Right arm, Patient Position: Sitting, Cuff Size: Large)   Ht 5' 7\" (1.702 m)   Wt 105 kg (230 lb 6.4 oz)   LMP 06/12/2023 (Exact Date)   Breastfeeding Yes Comment: pumping  BMI 36.09 kg/m²   She appears well and in no distress  Breast exam deferred  Abdomen is soft and nontender   External genitals are normal  Vagina is normal, well healed perineal laceration  Cervix, uterus and adnexa are nontender, no masses palpable.       A/P:  Postpartum visit.  She will return 3-4 months for her yearly exam, sooner PRN.   - due for pap at 3 month visit  - gHTN: normotensive today  -planning condoms for contraception, f/u if desire alternative method  "

## 2024-03-18 NOTE — PATIENT INSTRUCTIONS
Continue with your current pumping routine.  Avoid aggressive massage of your breasts as this can cause increased pain, inflammation and injury.  Use cool compresses and OTC pain relievers as needed for any pain and call if your pain does not resolve quickly.  Follow up with Dr Casillas as scheduled.  Please call with any questions or concerns.

## 2024-03-22 ENCOUNTER — TELEPHONE (OUTPATIENT)
Dept: POSTPARTUM | Facility: CLINIC | Age: 29
End: 2024-03-22

## 2024-03-22 ENCOUNTER — OFFICE VISIT (OUTPATIENT)
Dept: OBGYN CLINIC | Facility: CLINIC | Age: 29
End: 2024-03-22
Payer: COMMERCIAL

## 2024-03-22 VITALS
DIASTOLIC BLOOD PRESSURE: 60 MMHG | SYSTOLIC BLOOD PRESSURE: 120 MMHG | WEIGHT: 230 LBS | HEIGHT: 67 IN | BODY MASS INDEX: 36.1 KG/M2

## 2024-03-22 DIAGNOSIS — M65.4 DE QUERVAIN'S TENOSYNOVITIS, LEFT: Primary | ICD-10-CM

## 2024-03-22 PROCEDURE — 20550 NJX 1 TENDON SHEATH/LIGAMENT: CPT | Performed by: PHYSICIAN ASSISTANT

## 2024-03-22 PROCEDURE — 99214 OFFICE O/P EST MOD 30 MIN: CPT | Performed by: PHYSICIAN ASSISTANT

## 2024-03-22 RX ORDER — LIDOCAINE HYDROCHLORIDE 10 MG/ML
1 INJECTION, SOLUTION INFILTRATION; PERINEURAL
Status: COMPLETED | OUTPATIENT
Start: 2024-03-22 | End: 2024-03-22

## 2024-03-22 RX ORDER — BETAMETHASONE SODIUM PHOSPHATE AND BETAMETHASONE ACETATE 3; 3 MG/ML; MG/ML
6 INJECTION, SUSPENSION INTRA-ARTICULAR; INTRALESIONAL; INTRAMUSCULAR; SOFT TISSUE
Status: COMPLETED | OUTPATIENT
Start: 2024-03-22 | End: 2024-03-22

## 2024-03-22 RX ADMIN — LIDOCAINE HYDROCHLORIDE 1 ML: 10 INJECTION, SOLUTION INFILTRATION; PERINEURAL at 13:30

## 2024-03-22 RX ADMIN — BETAMETHASONE SODIUM PHOSPHATE AND BETAMETHASONE ACETATE 6 MG: 3; 3 INJECTION, SUSPENSION INTRA-ARTICULAR; INTRALESIONAL; INTRAMUSCULAR; SOFT TISSUE at 13:30

## 2024-03-22 NOTE — TELEPHONE ENCOUNTER
Elizabeth called - she had a steroid injection today & asking if ok to nurse or should she pump & dump.    Francesco 3wks

## 2024-03-22 NOTE — PROGRESS NOTES
"Patient Name:  Elizabeth Bear  MRN:  [id number]    Assessment & Plan     Left wrist de Quervain's tenosynovitis  Patient was offered and accepted a left first dorsal compartment corticosteroid injection today.  Patient noted immediate improvement in her symptoms after going the injection.  Continue thumb spica brace as needed for pain.  Over-the-counter anti-inflammatories as needed.  Follow-up as needed.    Chief Complaint     Left wrist pain.    History of the Present Illness     Elizabeth Bear is a 28 y.o. right-hand-dominant female who reports to the office today for evaluation of her left wrist.  She notes an onset of pain 4 weeks ago.  She denies any injury or trauma but attributes the pain to repetitive lifting of her  son.  She notes pain along the radial aspect of the wrist with associated swelling.  Pain is worse with repetitive use, grasping objects and lifting.  She notes weakness due to the pain.  No instability.  No numbness or tingling.  No fevers or chills.  She has been taking ibuprofen without significant improvement.  Currently pain is 7 out of 10 intensity.    Physical Exam     /60   Ht 5' 7\" (1.702 m)   Wt 104 kg (230 lb)   LMP 2023 (Exact Date)   BMI 36.02 kg/m²     Left wrist: No gross deformity.  Skin intact.  No erythema or ecchymosis.  There is swelling along the radial aspect of the wrist.  There is significant tenderness over the first dorsal compartment.  No tenderness over the thumb CMC joint.  No tenderness distal radius and distal ulna.  No tenderness over the scaphoid.  Full wrist flexion extension without pain.  There is pain with radial and ulnar deviation.  Positive Finkelstein test.  Negative thumb CMC grind test.  Full composite fist formation.  Sensation intact median ulnar and radial nerves.  2+ radial pulse.    Eyes: Anicteric sclerae.  ENT: Trachea midline.  Lungs: Normal respiratory effort.  CV: Capillary refill is less than 2 seconds.  Skin: " Intact without erythema.  Lymph: No palpable lymphadenopathy.  Neuro: Sensation is grossly intact to light touch.  Psych: Mood and affect are appropriate.    Past Medical History:   Diagnosis Date    Abnormal Pap smear of cervix 01/26/2023    ASCUS HPV other    Allergic rhinitis     Anxiety     Asthma     Cyst of right ovary 03/08/2022    Depression     well managed with therapy    Ectopic pregnancy 03/08/2022    Questional cyst vs ectopic    Endometriosis     Intractable migraine with status migrainosus 10/08/2022    Miscarriage 02/2022    POTS (postural orthostatic tachycardia syndrome)     PTSD (post-traumatic stress disorder)     S/P D&C (status post dilation and curettage) 03/13/2022    S/P ovarian cystectomy 03/13/2022       Past Surgical History:   Procedure Laterality Date    DILATION AND CURETTAGE OF UTERUS N/A 03/08/2022    Procedure: DILATATION AND CURETTAGE (D&C);  Surgeon: Serenity Livingston MD;  Location: [facility];  Service: Gynecology    LAPAROSCOPIC ENDOMETRIOSIS FULGURATION  03/03/2021    OR LAPS ABD PRTM&OMENTUM DX W/WO SPEC BR/WA SPX N/A 03/08/2022    Procedure: LAPAROSCOPY DIAGNOSTIC, right ovarian cystectomy;  Surgeon: Serenity Livingston MD;  Location: [facility];  Service: Gynecology       No Known Allergies    Current Outpatient Medications on File Prior to Visit   Medication Sig Dispense Refill    albuterol (2.5 mg/3 mL) 0.083 % nebulizer solution Take 3 mL (2.5 mg total) by nebulization every 6 (six) hours as needed for wheezing or shortness of breath 90 mL 0    albuterol (ProAir HFA) 90 mcg/act inhaler Inhale 2 puffs every 6 (six) hours as needed for wheezing 8.5 g 0    Prenatal Vit-Fe Fumarate-FA (PRENATAL PO) Take by mouth      acetaminophen (TYLENOL) 325 mg tablet Take 2 tablets (650 mg total) by mouth every 4 (four) hours as needed for mild pain (Patient not taking: Reported on 3/18/2024)      hydrocortisone 2.5 % cream Apply topically 2 (two) times a day Apply pea sized amount twice daily to  nipple for 10-14 days (Patient not taking: Reported on 3/18/2024) 20 g 0    ibuprofen (MOTRIN) 600 mg tablet Take 1 tablet (600 mg total) by mouth every 6 (six) hours (Patient not taking: Reported on 3/18/2024)      magnesium oxide (MAG-OX) 400 mg tablet Take 1 tablet (400 mg total) by mouth daily (Patient not taking: Reported on 3/18/2024) 90 tablet 1    nystatin (MYCOSTATIN) powder Apply topically 3 (three) times a day As needed (Patient not taking: Reported on 3/18/2024) 30 g 0     No current facility-administered medications on file prior to visit.       Social History     Tobacco Use    Smoking status: Never    Smokeless tobacco: Never   Vaping Use    Vaping status: Never Used   Substance Use Topics    Alcohol use: Not Currently    Drug use: Never       Family History   Problem Relation Age of Onset    Other Mother         hx od traumatic MVA-disabled now and partially paralized    Leukemia Maternal Grandmother 66    Stroke Maternal Grandmother     Heart attack Maternal Grandmother     No Known Problems Half-Sister     No Known Problems Half-Sister        Review of Systems     As stated in the HPI. All other systems reviewed and are negative.      Hand/upper extremity injection: L extensor compartment 1  Procedure Details  Condition:de Quervain's tenosynovitis Site: L extensor compartment 1   Needle size: 22 G  Ultrasound guidance: no  Approach: dorsal  Medications administered: 1 mL lidocaine 1 %; 6 mg betamethasone acetate-betamethasone sodium phosphate 6 (3-3) mg/mL  Patient tolerance: patient tolerated the procedure well with no immediate complications  Dressing:  Sterile dressing applied

## 2024-03-22 NOTE — TELEPHONE ENCOUNTER
I reassured Elizabeth that the local injection of a low dose of steroid does not require an interruption in breastfeeding.  I encouraged her to call back with any additional questions or concerns.

## 2024-03-24 NOTE — PROGRESS NOTES
I have reviewed the notes, assessments, and/or procedures performed by Carmela Chen RN, IBCLC, I concur with her/his documentation of Elizabeth Casillas MD 03/24/24

## 2024-03-27 ENCOUNTER — OFFICE VISIT (OUTPATIENT)
Dept: POSTPARTUM | Facility: CLINIC | Age: 29
End: 2024-03-27
Payer: COMMERCIAL

## 2024-03-27 VITALS — SYSTOLIC BLOOD PRESSURE: 122 MMHG | DIASTOLIC BLOOD PRESSURE: 64 MMHG

## 2024-03-27 PROCEDURE — 99215 OFFICE O/P EST HI 40 MIN: CPT | Performed by: PEDIATRICS

## 2024-03-27 NOTE — PATIENT INSTRUCTIONS
"Offer the breast as often as you feel comfortable.     Express breast milk whenever Francesco is fed by bottle, as needed for comfort, and as often as you feel is needed to maintain your milk production. Try to avoid \"triple feeding\" as this is drain on your energy and emotions.   "

## 2024-03-27 NOTE — PROGRESS NOTES
BREAST FEEDING FOLLOW UP VISIT    Informant/Relationship: Elizabeth/mom    Discussion of General Lactation Issues: Francesco continues to bite when offered the breast. He does occasionally attach better and feed intermittently for about 10 minutes, but still seems hungry. Biting leads to an 8/10 pain.   Francesco also seems to get tired before he finishes his bottle. This is worse when he is tired to begin with, such as at night. He often takes as little as 15-60 ml before falling asleep but then cues again to feed.    Francesco does spit up not infrequently. It was recommended that he be held upright for 15 minutes after feeding him. This is contributing to the stress related to feeding him.     Infant is 29 days old today.    Interval Breastfeeding History:    Frequency of breast feeding: offering every other day or so  Does mother feel breastfeeding is effective: If no, explain: not getting an effective attachment  Does infant appear satisfied after nursing No, still looks hungry  Stooling pattern normal:Yes  Urinating frequently:Yes  Using shield or shells:No    Alternative/Artificial Feedings:   Bottle: Yes, using paced bottle feeding  Cup: No  Syringe/Finger: No           Formula Type: Enfamil NeuroPro initially for supplementation                     Amount: none for several weeks            Breast Milk:                      Amount: 3 oz per bottle, occasionally more offered; always takes when offered; never gives signs that he is full            Frequency Q 2-3 Hr between feedings  Elimination Problems: No      Equipment:  Nipple Shield             Type: n/a             Size: n/a             Frequency of Use: n/a  Pump            Type: Aster, also has Spectra, likes the Aster better            Frequency of Use: every 3-4 hours, up to 5 hours at night; collects about 40 hours per day and has just about 2 oz excess if that  Shells            Type: n/a            Frequency of use: n/a    Equipment Problems:  no      Mom:  Breast: Large, heavy, pendulous; very large areolas  Nipple Assessment in General: Broad: elongated/eraser, no discoloration and no damage noted; tissue immediately proximal to the nipples is paler with some increased thickness and a texture similar to lichenification  Mother's Awareness of Feeding Cues                 Recognizes: Yes, but unsure of cues when he is content                  Verbalizes: Yes  Support System: FOB, extended famioy  History of Breastfeeding: none  Changes/Stressors/Violence: Concerned about difficult attachment  Concerns/Goals: Elizabeth wishes to feed exclusively her breast milk, at the breast if possible; she wishes that Francesco was not as fatigued by eating    Problems with Mom: Thickened skin around broad nipples; attachment related pain    Physical Exam  Constitutional:       Appearance: Normal appearance. She is well-developed and normal weight.   HENT:      Head: Normocephalic and atraumatic.   Eyes:      Extraocular Movements: Extraocular movements intact.   Neck:      Thyroid: No thyromegaly.   Cardiovascular:      Rate and Rhythm: Normal rate and regular rhythm.      Pulses: Normal pulses.      Heart sounds: Normal heart sounds. No murmur heard.  Pulmonary:      Effort: Pulmonary effort is normal.      Breath sounds: Normal breath sounds.   Musculoskeletal:         General: No swelling or tenderness. Normal range of motion.      Cervical back: Normal range of motion and neck supple.      Right lower leg: No edema.      Left lower leg: No edema.   Lymphadenopathy:      Cervical: No cervical adenopathy.      Upper Body:      Right upper body: No pectoral adenopathy.      Left upper body: No pectoral adenopathy.   Neurological:      General: No focal deficit present.      Mental Status: She is alert and oriented to person, place, and time.   Psychiatric:         Mood and Affect: Mood normal.         Behavior: Behavior normal.         Thought Content: Thought content  normal.         Judgment: Judgment normal.   Vitals and nursing note reviewed.         Infant:  Behaviors: Alert, fell asleep during exam, woke to breastfeed after frenotomy  Color: Healthy  Birth weight: 2.92 kg  Current weight: 3.714 kg    Problems with infant: Restricted tongue movement      General Appearance:  Alert, active, no distress                             Head:  Normocephalic, AFOF, sutures opposed                             Eyes:  Conjunctiva clear, no drainage                              Ears:  Normally placed, no anomolies                             Nose:  Septum intact, no drainage or erythema                           Mouth:  No lesions; tongue extends over inferior alveolar ridge but not to the lower lip; there is very limited lateralization with clear rolling of the contralateral edge; tongue tip curls toward the palate when crying; there is limited cupping of the examiner's finger and little peristalsis with more of a back and forth, piston-like movement; the longer he sucks on the examiner's finger the better the seal attained, but no peristalsis is attained; frequent fasciculation is noted; frenulum is moderately thin extending from 4 mm posterior to the tip of the tongue to insertion 2 mm below the crest of the inferior alveolar ridge; there is limited passive lift of the tongue and narrowing of the oral gape.                    Neck:  Supple, symmetrical, trachea midline, no adenopathy; thyroid: no enlargement, symmetric, no tenderness/mass/nodules                 Respiratory:  No grunting, flaring, retractions, breath sounds clear and equal            Cardiovascular:  Regular rate and rhythm. No murmur. Adequate perfusion/capillary refill. Femoral pulse present                    Abdomen:   Soft, non-tender, no masses, bowel sounds present, no HSM             Genitourinary:  Normal male, testes descended, no discharge, swelling, or pain, anus patent                          Spine:   No  abnormalities noted        Musculoskeletal:  Full range of motion          Skin/Hair/Nails:   Skin warm, dry, and intact, no rashes or abnormal dyspigmentation or lesions                Neurologic:   No abnormal movement, tone appropriate for gestational age    [place] Latch:  Efficiency:               Lips Flanged: Yes, on both breast and bottle after frenotomy              Depth of latch: Very good on bottle and good on the left breast after the frenotomy; Francesco continues to struggle to get more than a shallow attachment to the right breast              Audible Swallow: Yes, on the left breast and on the bottle following the frenotomy              Visible Milk: Yes, after frenotomy              Wide Open/ Asymmetrical: Yes, on the left breast and on the bottle after the frenotomy              Suck Swallow Cycle: Breathing: Unlabored, Coordinated: Yes  Nipple Assessment after latch: Normal: elongated/eraser, no discoloration and no damage noted.  Latch Problems: Even after the frenotomy, Francesco struggles to attain a wide, deep, asymmetrical, and comfortable attachment on the right breast, primarily due to thickening of the skin just proximal to the nipple. He does, however, get a wider, deeper, more asymmetrical, and comfortable attachment to the left breast where he is able to attain a sustained SSB and nurses well. He releases the breast on his own. He does cue again after burping and takes 2 additional ounces from the bottle where he also demonstrates a wider, deeper, more flanged latch.    Position:  Infant's Ergonomics/Body               Body Alignment: Yes               Head Supported: Yes               Close to Mom's body/ Lifted/ Supported: Yes               Mom's Ergonomics/Body: Yes                           Supported: Yes                           Sitting Back: Yes                           Brings Baby to her breast: Yes  Positioning Problems: None, for either feeding at the breast or the  "bottle        Education:  Reviewed Latch: Reviewed how to gently compress the breast as if offering a sandwich to facilitate a deeper latch.    Reviewed Positioning for Dyad: Reviewed how to bring baby to the breast so that his lower lip and chin touch the breast with his nose just above the nipple to encourage a wider, more asymmetric latch.   Reviewed Frequency/Supply & Demand: Recommended feeding on demand: when the baby gives hunger cues, when the breasts feel full, every 3 hours during the day and every 5 hours at night counting from the beginning of one feeding to the beginning of the next; whichever comes first.    Reviewed Alternative/Artificial Feedings: Paced feeding  Reviewed Mom/Breast care: Reviewed sizing for the flange for best comfort and least damage to the breast        Plan:  Discussed history and physical exams with Elizabeth. Support given for her commitment to providing breast milk for her baby. Discussed the findings on the baby's exam consistent with tongue tie and reviewed how this may be the cause of nipple trauma, nipple pain, nipple damage, poor milk transfer, blocked ducts, mastitis, and loss of milk production. Discussed the science that supports performing the frenotomy to improve latch.   Recommended Elizabeth offer the breast as often as she feels comfortable. Recommended that she focus on offering the left breast for the moment, expressing breast milk from the right and using paced bottle feeding. Recommended expressing breast milk whenever Francesco is fed by bottle and as needed for comfort. Additional expression as able and Elizabeth feels is needed to help maintain milk production may be helpful, but not if it means doing \"triple feeding.\"    Recommended looking closely at the fit of the pump flanges, especially on the right. The skin around the right  nipple looks very thickened as if secondary to trauma from pumping. Discussed proper flange fit so that the nipple moves freely in " the tunnel with little to no areola entering the tunnel. Encouraged patience as the baby gets bigger to allow him to be able to grasp more of the NAC when attaching, especially on the right.        I have spent 55 minutes with Patient  today in which greater than 50% of this time was spent in counseling/coordination of care regarding Prognosis, Risks and benefits of tx options, Instructions for management, Patient and family education, Importance of tx compliance, Risk factor reductions, Impressions, Counseling / Coordination of care, Documenting in the medical record, Reviewing / ordering tests, medicine, procedures  , and Obtaining or reviewing history  .

## 2024-04-10 ENCOUNTER — TELEPHONE (OUTPATIENT)
Dept: POSTPARTUM | Facility: CLINIC | Age: 29
End: 2024-04-10

## 2024-04-10 ENCOUNTER — OFFICE VISIT (OUTPATIENT)
Dept: POSTPARTUM | Facility: CLINIC | Age: 29
End: 2024-04-10

## 2024-04-10 NOTE — PATIENT INSTRUCTIONS
Continue to offer the breast as often as you feel comfortable.  Continue pumping as needed to meet your milk production goals.  Follow up as needed.  Please call with any questions or concerns.

## 2024-04-10 NOTE — LETTER
April 11, 2024         To whom it may concern,    I am writing on behalf of Elizabeth Bear, date of birth [birth year]. Elizabeth has been on maternity leave after the birth of her son Francesco Bear. Francesco was born on [birth year]. Marissa is breastfeeding Francesco, but he has had difficulty feeding from the start.    Francesco was diagnosed with a tongue tie and had a frenotomy, surgery to release the restricted tongue and improve his ability to nurse. However, he continues to struggle and is receiving speech therapy to improve his tongue movement and ability to both breast and bottle feed. He has also been diagnosed with torticollis or a tightness in his neck which also impedes his ability to attach at the breast.     In addition to continuing to struggle with feeding Francesco, Elizabeth needs to take Francesco to both his PT and speech appointments. We are asking for her maternity leave to be extended to allow her to continue to work on breastfeeding and to take Francesco to his appointments.    Thank  you for your consideration,        Louise Casillas MD, ROBERT-C

## 2024-04-10 NOTE — PROGRESS NOTES
BREAST FEEDING FOLLOW UP VISIT    Informant/Relationship: Elizabeth    Discussion of General Lactation Issues: Elizabeth and Francesco are here to follow up after his frenotomy.  Elizabeth feels that Francesco is sucking more effectively now but only for short periods. She feels that when he fatigues, he reverts back to biting.  He seems interested in nursing but is quickly frustrated.  Elizabeth has purchased a nipple shield and has been using it for feedings at the breast.    Infant is 6 weeks old today.    Interval Breastfeeding History:  Frequency of breast feeding: typically once a day  Does mother feel breastfeeding is effective: If no, explain: he is very quickly frustrated and at times refuses to latch at all.  Does infant appear satisfied after nursing No, still looks hungry  Stooling pattern normal:Yes  Urinating frequently:Yes  Using shield or shells:Yes     Alternative/Artificial Feedings:   Bottle: Yes, using paced bottle feeding.  Francesco fatigues while feeding and often can't complete a feeding.  Cup: No  Syringe/Finger: No           Formula Type: none                     Amount: none            Breast Milk:                      Amount: 3-4 ounces.  Sometimes he seems like he wants more but is too tired to take it.            Frequency Q 2-3 Hr between feedings  Elimination Problems: No        Equipment:  Nipple Shield             Type: Lansinoh             Size: 24mm             Frequency of Use: tried a few times.  Elizabeth feels that it has helped him suck more effectively with the shield  Pump            Type: Aster, also has Spectra, likes the Aster better            Frequency of Use: every 3-4 hours, up to 5 hours at night; collects about 40 ounces per day.  This is a surplus of at least 10 ounces per day  Shells            Type: n/a            Frequency of use: n/a     Equipment Problems: no        Mom:  Breast: Large, heavy, pendulous; very large areolas  Nipple Assessment in General: Broad:  elongated/eraser, no discoloration and no damage noted; tissue immediately proximal to the nipples is paler with some increased thickness and a texture similar to lichenification  Mother's Awareness of Feeding Cues                 Recognizes: Yes, but she is not always confident.                  Verbalizes: Yes  Support System: FOB, extended family  History of Breastfeeding: none  Changes/Stressors/Violence: Concerned about difficult attachment  Concerns/Goals: Elizabeth wishes to feed exclusively her breast milk, at the breast if possible; she wishes that Francesco was not as fatigued by eating     Problems with Mom: Thickened skin around broad nipples; attachment related pain    Physical Exam  Constitutional:       Appearance: Normal appearance.   HENT:      Head: Normocephalic and atraumatic.      Nose: Nose normal.   Pulmonary:      Effort: Pulmonary effort is normal.   Musculoskeletal:         General: Normal range of motion.      Cervical back: Normal range of motion and neck supple.   Neurological:      Mental Status: She is alert and oriented to person, place, and time.   Skin:     General: Skin is warm and dry.   Psychiatric:         Mood and Affect: Mood normal.         Behavior: Behavior normal.         Thought Content: Thought content normal.         Judgment: Judgment normal.         Infant:  Behaviors: Alert  Color: Pink  Birth weight: 2920 grams  Current weight: 4345 grams    Problems with infant: tongue tie s/p frenotomy, not latching or feeding effectively at the breast.    General Appearance:  Alert, active, no distress                             Head:  Normocephalic, AFOF, sutures opposed                             Eyes:  Conjunctiva clear, no drainage                              Ears:  Normally placed, no anomolies                             Nose:  No drainage or erythema                           Mouth:  No lesions. Narrow gape.  Tongue did not extend beyond the lower alveolar ridge,  lateralized well and tip lifted without restriction.  Poor cupping with very little suction generated while he sucked on my finger.  Poor peristalsis.  The tongue did not extend over the lower alveolar ridge and Francesco bit down on my finger.  His upper lip curled under tightly  as he sucked. The frenotomy wound has almost completely healed.                             Neck:  Supple, symmetrical, trachea midline                 Respiratory:  No grunting, flaring, retractions, breath sounds clear and equal            Cardiovascular:  Regular rate and rhythm. No murmur. Adequate perfusion/capillary refill.                    Abdomen:   Soft, non-tender, no masses, bowel sounds present, no HSM             Genitourinary:  Normal male, testes descended, no discharge, swelling, or pain, anus patent                          Spine:   No abnormalities noted        Musculoskeletal:  Full range of motion          Skin/Hair/Nails:   Skin warm, dry, and intact, erythema toxicum on torso and extremities, jaundice to nipple line.                Neurologic:   No abnormal movement, tone appropriate    [place] Latch:  Efficiency:               Lips Flanged: Yes              Depth of latch: shallow, just on the nipple, with and without the nipple shield              Audible Swallow: No              Visible Milk: Yes              Wide Open/ Asymmetrical: No              Suck Swallow Cycle: Breathing: unlabored, Coordinated: n/a  Francesco did not suck more than a couple of times  Nipple Assessment after latch: Normal: elongated/eraser, no discoloration and no damage noted.  Latch Problems: Francesco latched with and without the nipple shield.  He never established SSB either way.  Using the shield did not improve his ability to latch or suckle effectively at the breast.  He was not cuing to feed when a feeding was offered.  After the latch attempt, Elizabeth felt he may be giving feeding cues but when offered a bottle, he sucked briefly  but did not drink and was quickly asleep.    Position:  Infant's Ergonomics/Body               Body Alignment: Yes               Head Supported: Yes               Close to Mom's body/ Lifted/ Supported: Yes               Mom's Ergonomics/Body: Yes                           Supported: Yes                           Sitting Back: Yes                           Brings Baby to her breast: Yes  Positioning Problems: None      Handouts:   None    Education:  Reviewed Latch: discussed that Francesco continues to have poor tongue function which explains why he is still having trouble latching and feeding effectively at the breast.  Reviewed Equipment: Discussed proper use and risks of using nipple shields      Plan:    Reassurance and support given.  I acknowledged Elizabeth's concerns and her feelings about her breastfeeding journey so far.    I encouraged her to continue to offer the breast as often as she feels comfortable.  I encouraged her to continue pumping as needed to meet her milk production goals.  I reviewed the potential impact of over production on maternal breast health and comfort and infant comfort when feeding at the breast.  I encouraged her to schedule a follow up appointment for more support as Francesco begins to latch and feed more effectively at the breast or as desired for more support for any reason.  I encouraged her to call with any questions or concerns.    I have spent 60 minutes with Patient and family today in which greater than 50% of this time was spent in counseling/coordination of care regarding Instructions for management, Patient and family education, and Counseling / Coordination of care.

## 2024-04-10 NOTE — TELEPHONE ENCOUNTER
Elizabeth is requesting a LOMN to extend her maternity leave due to Francesco's feeding difficulty.  He has been referred to PT and ST and she will need the time off to take him to his appointments.

## 2024-04-11 NOTE — TELEPHONE ENCOUNTER
Elizabeth's letter of medical necessity has been written and should be available through [employer] for her to access.

## 2024-04-13 NOTE — PROGRESS NOTES
I have reviewed the notes, assessments, and/or procedures performed by Carmela Chen RN, IBCLC, I concur with her/his documentation of Elizabeth Casillas MD 04/12/24

## 2024-04-24 ENCOUNTER — TELEPHONE (OUTPATIENT)
Dept: OBGYN CLINIC | Facility: CLINIC | Age: 29
End: 2024-04-24

## 2024-05-01 ENCOUNTER — TELEPHONE (OUTPATIENT)
Dept: POSTPARTUM | Facility: CLINIC | Age: 29
End: 2024-05-01

## 2024-05-01 NOTE — TELEPHONE ENCOUNTER
"----- Message from Elizabeth Chema sent at 5/1/2024  4:07 PM EDT -----  Regarding: Nipple pain  Contact: [phone]  Hello, I have had extremely painful nipples the past couple of days. I'm not doing anything different than what I have been doing the last 2 months and I've had sensitive/painful nipples before but this is particularly excruciating. I dread the idea of pumping, which i am still exclusively doing with breastfeeding attempts for short periods of time but his \"latch\" isnt painful even though theres not much of a good secure latch. My breasts are not in pain at all it's really just the nipple. I've tried to put the cream on it they gave me at the hospital but it doesn't seem to be helping. I also tried breastfeeding specific ice packs but they weren't very cold and gave me no relief. Is there anything else I can do? Thank you  "

## 2024-05-01 NOTE — TELEPHONE ENCOUNTER
Elizabeth has been pumping and nursing per her usual routine.  She does not feel anything has changed with how she is pumping or feeding Francesco.  For the last few days, she is having fairly intense pain in both nipples, particularly on the left.  The pain is worse when she is pumping but she is still very uncomfortable between pumping sessions.   She has tried cool compresses but that did not help.  She is using lanolin due to her pain.  She has also used it in the past.   Her nipples seem normal.  Maybe a little pinker than usual. She has not seen any blanching or deep purple color of her nipples.   I explained to Elizabeth that it is difficult to be sure with just a phone call why she has had this sudden change.  I suggested that she discontinue using the lanolin and to wash off the residue from her nipples and anything that comes in contact with her breasts with a mild soap.  I suggested that she use olive oil to soothe instead.  I reviewed supportive care for vasospasms in case that is the source of her pain given the history of early nipple trauma.  I asked her to send a picture of both nipples and areolas through [employer] to see if a clear etiology can be seen.  I recommended that she call back to schedule an appointment if her symptoms do not resolve quickly or with any other questions or concerns.

## 2024-05-17 ENCOUNTER — TELEPHONE (OUTPATIENT)
Dept: POSTPARTUM | Facility: CLINIC | Age: 29
End: 2024-05-17

## 2024-05-17 NOTE — TELEPHONE ENCOUNTER
LVM for patient stating that I was returning her call regarding questions she had about her breastmilk. I provided our office's callback number so that she can call back for assistance.

## 2024-07-26 ENCOUNTER — ANNUAL EXAM (OUTPATIENT)
Dept: OBGYN CLINIC | Facility: CLINIC | Age: 29
End: 2024-07-26
Payer: COMMERCIAL

## 2024-07-26 VITALS
DIASTOLIC BLOOD PRESSURE: 68 MMHG | BODY MASS INDEX: 36.32 KG/M2 | SYSTOLIC BLOOD PRESSURE: 114 MMHG | WEIGHT: 226 LBS | HEIGHT: 66 IN

## 2024-07-26 DIAGNOSIS — R68.82 DECREASED LIBIDO: ICD-10-CM

## 2024-07-26 DIAGNOSIS — Z01.419 ENCNTR FOR GYN EXAM (GENERAL) (ROUTINE) W/O ABN FINDINGS: Primary | ICD-10-CM

## 2024-07-26 PROBLEM — B37.2 CANDIDA INFECTION OF FLEXURAL SKIN: Status: RESOLVED | Noted: 2023-09-29 | Resolved: 2024-07-26

## 2024-07-26 PROBLEM — O26.893 RH NEGATIVE STATE IN ANTEPARTUM PERIOD, THIRD TRIMESTER: Status: RESOLVED | Noted: 2023-09-07 | Resolved: 2024-07-26

## 2024-07-26 PROBLEM — O13.3 GESTATIONAL HYPERTENSION, THIRD TRIMESTER: Status: RESOLVED | Noted: 2024-02-10 | Resolved: 2024-07-26

## 2024-07-26 PROBLEM — R10.2 PELVIC CRAMPING: Status: RESOLVED | Noted: 2024-01-09 | Resolved: 2024-07-26

## 2024-07-26 PROBLEM — Z67.91 RH NEGATIVE STATE IN ANTEPARTUM PERIOD, THIRD TRIMESTER: Status: RESOLVED | Noted: 2023-09-07 | Resolved: 2024-07-26

## 2024-07-26 PROCEDURE — G0145 SCR C/V CYTO,THINLAYER,RESCR: HCPCS | Performed by: PHYSICIAN ASSISTANT

## 2024-07-26 PROCEDURE — S0612 ANNUAL GYNECOLOGICAL EXAMINA: HCPCS | Performed by: PHYSICIAN ASSISTANT

## 2024-07-26 RX ORDER — ESTRADIOL 0.1 MG/G
CREAM VAGINAL
Qty: 42.5 G | Refills: 0 | Status: SHIPPED | OUTPATIENT
Start: 2024-07-26

## 2024-07-26 NOTE — PROGRESS NOTES
Assessment   29 y.o.  presenting for annual exam.     Plan   Diagnoses and all orders for this visit:    Encntr for gyn exam (general) (routine) w/o abn findings  -     Liquid-based pap, screening    Decreased libido  -     TSH, 3rd generation with Free T4 reflex; Future  -     CBC and differential; Future  -     estradiol (ESTRACE VAGINAL) 0.1 mg/g vaginal cream; Apply pea sized amount to the external vulva. Then apply 3 times weekly        Pap collected today  Decreased libido- check TSH/CBC. Discussed option of estrace cream to help with discomfort associated with intercourse. Pt would like to trial this.  Skin candidiasis- pt has nystatin powder at home which she will use to treat this    Perineal hygiene reviewed. Weight bearing exercises minium of 150 mins/weekly advised. Kegel exercises recommended.   SBE encouraged, A yearly mammogram is recommended for breast cancer screening starting at age 40. ASCCP guidelines reviewed. Condoms encouraged with all sexual activity to prevent STI's. Gardisil vaccines recommended up to age 45. Calcium/ Vit D dietary requirements discussed.   Advised to call with any issues, all concerns & questions addressed.   See provided information in your after visit summary     F/U Annually and PRN    Results will be released to [employer], if abnormal will call or message to review and discuss treatment plan.     __________________________________________________________________    Subjective     Elizabeth Bear is a 29 y.o.  presenting for annual exam.     She reports some hair loss, decreased libido and painful intercourse since delivery.     SCREENING  Last Pap: 2023 ASCUS/+HPV  Colpo 2023 - no biopsy taken      GYN  She is still breastfeeding and had her first menses about 3 weeks ago    Sexually active: Yes - single partner - male  Contraception: condoms    Hx Abnormal pap: see above  We reviewed ASCCP guidelines for Pap testing today.  Gardasil: She has not  completed the Gardasil series.    Denies vaginal discharge, itching, odor, dyspareunia, pelvic pain and vulvar/vaginal symptoms      OB           Complaints: denies   Denies urgency, frequency, hematuria, leakage / change in stream, difficulty urinating.       BREAST  Complaints: + skin candidiasis  Denies: breast lump, breast tenderness, nipple discharge, skin color change, and skin lesion(s)      Pertinent Family Hx:   Family hx of breast cancer: no  Family hx of ovarian cancer: no  Family hx of colon cancer: no      GENERAL  PMH reviewed/updated and is as below.   Patient does follow with a PCP.    SOCIAL  Smoking: no  Alcohol:no  Drug: no      Past Medical History:   Diagnosis Date    Abnormal Pap smear of cervix 2023    ASCUS HPV other    Allergic rhinitis     Anxiety     Asthma     Cyst of right ovary 2022    Depression     well managed with therapy    Ectopic pregnancy 2022    Questional cyst vs ectopic    Endometriosis     Intractable migraine with status migrainosus 10/08/2022    Miscarriage 2022    POTS (postural orthostatic tachycardia syndrome)     PTSD (post-traumatic stress disorder)     S/P D&C (status post dilation and curettage) 2022    S/P ovarian cystectomy 2022       Past Surgical History:   Procedure Laterality Date    DILATION AND CURETTAGE OF UTERUS N/A 2022    Procedure: DILATATION AND CURETTAGE (D&C);  Surgeon: Serenity Livingston MD;  [facility];  Service: Gynecology    LAPAROSCOPIC ENDOMETRIOSIS FULGURATION  2021    GA LAPS ABD PRTM&OMENTUM DX W/WO SPEC BR/WA SPX N/A 2022    Procedure: LAPAROSCOPY DIAGNOSTIC, right ovarian cystectomy;  Surgeon: Serenity Livingston MD;  [facility];  Service: Gynecology         Current Outpatient Medications:     albuterol (2.5 mg/3 mL) 0.083 % nebulizer solution, Take 3 mL (2.5 mg total) by nebulization every 6 (six) hours as needed for wheezing or shortness of breath, Disp: 90 mL, Rfl:  0    albuterol (ProAir HFA) 90 mcg/act inhaler, Inhale 2 puffs every 6 (six) hours as needed for wheezing, Disp: 8.5 g, Rfl: 0    nystatin (MYCOSTATIN) powder, Apply topically 3 (three) times a day As needed, Disp: 30 g, Rfl: 0    Prenatal Vit-Fe Fumarate-FA (PRENATAL PO), Take by mouth, Disp: , Rfl:     acetaminophen (TYLENOL) 325 mg tablet, Take 2 tablets (650 mg total) by mouth every 4 (four) hours as needed for mild pain (Patient not taking: Reported on 7/26/2024), Disp: , Rfl:     hydrocortisone 2.5 % cream, Apply topically 2 (two) times a day Apply pea sized amount twice daily to nipple for 10-14 days (Patient not taking: Reported on 3/18/2024), Disp: 20 g, Rfl: 0    ibuprofen (MOTRIN) 600 mg tablet, Take 1 tablet (600 mg total) by mouth every 6 (six) hours (Patient not taking: Reported on 3/18/2024), Disp: , Rfl:     magnesium oxide (MAG-OX) 400 mg tablet, Take 1 tablet (400 mg total) by mouth daily (Patient not taking: Reported on 3/18/2024), Disp: 90 tablet, Rfl: 1    No Known Allergies    Social History     Socioeconomic History    Marital status: /Civil Union     Spouse name: Not on file    Number of children: Not on file    Years of education: Not on file    Highest education level: Not on file   Occupational History    Not on file   Tobacco Use    Smoking status: Never    Smokeless tobacco: Never   Vaping Use    Vaping status: Never Used   Substance and Sexual Activity    Alcohol use: Not Currently    Drug use: Never    Sexual activity: Yes     Partners: Male     Birth control/protection: Condom Male   Other Topics Concern    Not on file   Social History Narrative    ** Merged History Encounter **          Social Determinants of Health     Financial Resource Strain: Not on file   Food Insecurity: Not on file   Transportation Needs: Not on file   Physical Activity: Not on file   Stress: Not on file   Social Connections: Unknown (5/15/2023)    Received from [facility]  "Network     Social Network: Not on file   Intimate Partner Violence: Unknown (4/6/2023)    [facility] from [employer]    HITS     Physically Hurt: Not on file     Insult or Talk Down To: Not on file     Threaten Physical Harm: Not on file     Scream or Curse: Not on file   Housing Stability: Not on file       Review of Systems     ROS:  Constitutional: Negative for fatigue and unexpected weight change.   Respiratory: Negative for cough and shortness of breath.    Cardiovascular: Negative for chest pain and palpitations.   Gastrointestinal: Negative for abdominal pain and change in bowel habits  Breasts:  Negative, other than as noted above.   Genitourinary: Negative, other than as noted above.   Psychiatric: Negative for mood difficulties.      Objective      /68 (BP Location: Left arm, Patient Position: Sitting, Cuff Size: Standard)   Ht 5' 6\" (1.676 m)   Wt 103 kg (226 lb)   LMP 07/02/2024 (Exact Date)   Breastfeeding Yes   BMI 36.48 kg/m²     Physical Examination:    Patient appears well and is not in distress  Neck is supple without masses, no cervical or supraclavicular lymphadenopathy  Cardiovascular: regular rate and rhythm; no murmurs  Lungs: clear to auscultation bilaterally; no wheezes  Breasts are symmetrical without mass, tenderness, nipple discharge, or adenopathy. +candida rash   Abdomen is soft and nontender without masses.   External genitals are normal without lesions or rashes.  Urethral meatus and urethra are normal  Bladder is normal to palpation  Vagina is normal without discharge or bleeding.   Cervix is normal without discharge or lesion.   Uterus is normal, mobile, nontender without palpable mass.  Adnexa are normal, nontender, without palpable mass.                 "

## 2024-08-02 LAB
LAB AP GYN PRIMARY INTERPRETATION: NORMAL
Lab: NORMAL

## 2024-08-07 ENCOUNTER — OFFICE VISIT (OUTPATIENT)
Dept: OBGYN CLINIC | Facility: CLINIC | Age: 29
End: 2024-08-07
Payer: COMMERCIAL

## 2024-08-07 VITALS
SYSTOLIC BLOOD PRESSURE: 110 MMHG | WEIGHT: 229.2 LBS | BODY MASS INDEX: 36.83 KG/M2 | HEIGHT: 66 IN | DIASTOLIC BLOOD PRESSURE: 80 MMHG

## 2024-08-07 DIAGNOSIS — M65.4 DE QUERVAIN'S TENOSYNOVITIS, LEFT: Primary | ICD-10-CM

## 2024-08-07 PROCEDURE — 99213 OFFICE O/P EST LOW 20 MIN: CPT | Performed by: PHYSICIAN ASSISTANT

## 2024-08-07 PROCEDURE — 20550 NJX 1 TENDON SHEATH/LIGAMENT: CPT | Performed by: PHYSICIAN ASSISTANT

## 2024-08-07 RX ORDER — BETAMETHASONE SODIUM PHOSPHATE AND BETAMETHASONE ACETATE 3; 3 MG/ML; MG/ML
6 INJECTION, SUSPENSION INTRA-ARTICULAR; INTRALESIONAL; INTRAMUSCULAR; SOFT TISSUE
Status: COMPLETED | OUTPATIENT
Start: 2024-08-07 | End: 2024-08-07

## 2024-08-07 RX ORDER — LIDOCAINE HYDROCHLORIDE 10 MG/ML
1 INJECTION, SOLUTION INFILTRATION; PERINEURAL
Status: COMPLETED | OUTPATIENT
Start: 2024-08-07 | End: 2024-08-07

## 2024-08-07 RX ADMIN — BETAMETHASONE SODIUM PHOSPHATE AND BETAMETHASONE ACETATE 6 MG: 3; 3 INJECTION, SUSPENSION INTRA-ARTICULAR; INTRALESIONAL; INTRAMUSCULAR; SOFT TISSUE at 13:00

## 2024-08-07 RX ADMIN — LIDOCAINE HYDROCHLORIDE 1 ML: 10 INJECTION, SOLUTION INFILTRATION; PERINEURAL at 13:00

## 2024-08-07 NOTE — PROGRESS NOTES
"Patient Name:  Elizabeth Bear  MRN:  [id number]    Assessment & Plan     Left de Quervain's tenosynovitis.  Corticosteroid injection performed today into the left wrist first dorsal compartment.  Comfort Cool brace provided today at patient's request.  She has been utilizing 1 in the right wrist in the past and requested one for the left.  Follow-up as needed.  Discussed referral to one of our hand specialists if symptoms persist or return.    Chief Complaint     Follow-up left wrist pain    History of the Present Illness     Elizabeth Bear is a 29 y.o. female who returns to the office today for follow-up regarding her left wrist.  She was last seen on 3/22/2024.  At that time she received a corticosteroid injection into the first dorsal compartment of the left wrist.  She noted significant improvement up until approximately 1 month ago.  She notes a return of her pain.  Pain is localized to the radial aspect of the wrist with associated swelling.  Pain is worse with repetitive use and lifting, primarily with lifting her son.  She notes associated clicking and popping with motion.  She denies any instability.  She does note weakness due to the pain.  No numbness or tingling.  No fevers or chills.  She has been taking over-the-counter anti-inflammatories without significant improvement.  Currently pain is 8 out of 10 in intensity.    Physical Exam     /80 (BP Location: Left arm, Patient Position: Sitting, Cuff Size: Standard)   Ht 5' 6\" (1.676 m)   Wt 104 kg (229 lb 3.2 oz)   LMP 07/02/2024 (Exact Date)   BMI 36.99 kg/m²     Left wrist: No gross deformity.  Skin is intact without erythema or ecchymosis.  There is swelling over the radial aspect of the wrist.  No tenderness over the distal radius and distal ulna.  There is tenderness over the first dorsal compartment.  No tenderness thumb CMC joint.  Positive Finkelstein test.  Negative CMC grind test.  Full wrist flexion and extension with slight " discomfort in the radial aspect of the wrist.  Limited pronation and supination of the wrist with pain in the radial aspect of the wrist.  Full composite fist formation without pain.  Sensation intact median ulnar and radial nerves.  2+ radial pulse.    Eyes: Anicteric sclerae.  ENT: Trachea midline.  Lungs: Normal respiratory effort.  CV: Capillary refill is less than 2 seconds.  Skin: Intact without erythema.  Lymph: No palpable lymphadenopathy.  Neuro: Sensation is grossly intact to light touch.  Psych: Mood and affect are appropriate.      Past Medical History:   Diagnosis Date    Abnormal Pap smear of cervix 01/26/2023    ASCUS HPV other    Allergic rhinitis     Anxiety     Asthma     Cyst of right ovary 03/08/2022    Depression     well managed with therapy    Ectopic pregnancy 03/08/2022    Questional cyst vs ectopic    Endometriosis     Intractable migraine with status migrainosus 10/08/2022    Miscarriage 02/2022    POTS (postural orthostatic tachycardia syndrome)     PTSD (post-traumatic stress disorder)     S/P D&C (status post dilation and curettage) 03/13/2022    S/P ovarian cystectomy 03/13/2022       Past Surgical History:   Procedure Laterality Date    DILATION AND CURETTAGE OF UTERUS N/A 03/08/2022    Procedure: DILATATION AND CURETTAGE (D&C);  Surgeon: Serenity Livingston MD;  [facility];  Service: Gynecology    LAPAROSCOPIC ENDOMETRIOSIS FULGURATION  03/03/2021    NM LAPS ABD PRTM&OMENTUM DX W/WO SPEC BR/WA SPX N/A 03/08/2022    Procedure: LAPAROSCOPY DIAGNOSTIC, right ovarian cystectomy;  Surgeon: Serenity Livingston MD;  [facility];  Service: Gynecology       No Known Allergies    Current Outpatient Medications on File Prior to Visit   Medication Sig Dispense Refill    albuterol (2.5 mg/3 mL) 0.083 % nebulizer solution Take 3 mL (2.5 mg total) by nebulization every 6 (six) hours as needed for wheezing or shortness of breath 90 mL 0    albuterol (ProAir HFA) 90 mcg/act inhaler Inhale 2  puffs every 6 (six) hours as needed for wheezing 8.5 g 0    estradiol (ESTRACE VAGINAL) 0.1 mg/g vaginal cream Apply pea sized amount to the external vulva. Then apply 3 times weekly 42.5 g 0    nystatin (MYCOSTATIN) powder Apply topically 3 (three) times a day As needed 30 g 0    Prenatal Vit-Fe Fumarate-FA (PRENATAL PO) Take by mouth      acetaminophen (TYLENOL) 325 mg tablet Take 2 tablets (650 mg total) by mouth every 4 (four) hours as needed for mild pain (Patient not taking: Reported on 7/26/2024)      hydrocortisone 2.5 % cream Apply topically 2 (two) times a day Apply pea sized amount twice daily to nipple for 10-14 days (Patient not taking: Reported on 3/18/2024) 20 g 0    ibuprofen (MOTRIN) 600 mg tablet Take 1 tablet (600 mg total) by mouth every 6 (six) hours (Patient not taking: Reported on 3/18/2024)      magnesium oxide (MAG-OX) 400 mg tablet Take 1 tablet (400 mg total) by mouth daily (Patient not taking: Reported on 3/18/2024) 90 tablet 1     No current facility-administered medications on file prior to visit.       Social History     Tobacco Use    Smoking status: Never    Smokeless tobacco: Never   Vaping Use    Vaping status: Never Used   Substance Use Topics    Alcohol use: Not Currently    Drug use: Never       Family History   Problem Relation Age of Onset    Other Mother         hx od traumatic MVA-disabled now and partially paralized    Leukemia Maternal Grandmother 66    Stroke Maternal Grandmother     Heart attack Maternal Grandmother     No Known Problems Half-Sister     No Known Problems Half-Sister        Review of Systems     As stated in the HPI. All other systems reviewed and are negative.      Hand/upper extremity injection: L extensor compartment 1  Procedure Details  Condition:de Quervain's tenosynovitis Site: L extensor compartment 1   Needle size: 22 G  Ultrasound guidance: no  Approach: dorsal  Medications administered: 1 mL lidocaine 1 %; 6 mg betamethasone  acetate-betamethasone sodium phosphate 6 (3-3) mg/mL  Patient tolerance: patient tolerated the procedure well with no immediate complications  Dressing:  Sterile dressing applied

## 2024-09-27 NOTE — ADDENDUM NOTE
Addended by: Annabelle Burciaga on: 2/14/2022 12:42 PM     Modules accepted: Orders
27-Sep-2024 07:30

## 2024-10-08 ENCOUNTER — TELEPHONE (OUTPATIENT)
Age: 29
End: 2024-10-08

## 2024-10-08 NOTE — TELEPHONE ENCOUNTER
Received call from Yung with [email or web address]. States that an order for a breast pump was faxed over on 9/17 and again 10/3. Wants to be sure this was received.

## 2025-02-13 ENCOUNTER — PROCEDURE VISIT (OUTPATIENT)
Dept: OBGYN CLINIC | Facility: CLINIC | Age: 30
End: 2025-02-13
Payer: COMMERCIAL

## 2025-02-13 VITALS — SYSTOLIC BLOOD PRESSURE: 116 MMHG | DIASTOLIC BLOOD PRESSURE: 82 MMHG | BODY MASS INDEX: 36.99 KG/M2 | WEIGHT: 229.2 LBS

## 2025-02-13 DIAGNOSIS — Z30.430 ENCOUNTER FOR IUD INSERTION: Primary | ICD-10-CM

## 2025-02-13 PROCEDURE — 58300 INSERT INTRAUTERINE DEVICE: CPT | Performed by: OBSTETRICS & GYNECOLOGY

## 2025-02-13 NOTE — PROGRESS NOTES
IUD Procedure    Date/Time: 2/13/2025 11:38 AM    Performed by: Reema Johansen MD  Authorized by: Reema Johansen MD    Written consent obtained?: Yes    Risks and benefits: Risks, benefits and alternatives were discussed    Consent given by:  Patient  Select procedure: IUD insertion    IUD Insertion:     Negative urine pregnancy test: no (abstinent since menses)      Cervix cleaned and prepped: yes      Speculum placed in vagina: yes      Tenaculum applied to cervix: yes      IUD inserted with no complications: yes      Strings trimmed: yes      Uterus sounded: yes      Uterus sound depth (cm):  9    IUD type:  Levonorgestrel 20 MCG/DAY  Post-procedure:     Patient tolerated procedure well: yes      Patient will follow up after next period: yes    Insertion Comments:      Bleeding, infection precautions reviewed.

## 2025-06-10 ENCOUNTER — ANNUAL EXAM (OUTPATIENT)
Dept: OBGYN CLINIC | Facility: CLINIC | Age: 30
End: 2025-06-10
Payer: COMMERCIAL

## 2025-06-10 ENCOUNTER — PATIENT MESSAGE (OUTPATIENT)
Dept: OBGYN CLINIC | Facility: CLINIC | Age: 30
End: 2025-06-10

## 2025-06-10 VITALS
DIASTOLIC BLOOD PRESSURE: 80 MMHG | HEIGHT: 66 IN | SYSTOLIC BLOOD PRESSURE: 132 MMHG | BODY MASS INDEX: 38.15 KG/M2 | WEIGHT: 237.4 LBS

## 2025-06-10 DIAGNOSIS — Z01.419 ENCNTR FOR GYN EXAM (GENERAL) (ROUTINE) W/O ABN FINDINGS: Primary | ICD-10-CM

## 2025-06-10 DIAGNOSIS — B37.2 CANDIDA INFECTION OF FLEXURAL SKIN: ICD-10-CM

## 2025-06-10 PROCEDURE — 99395 PREV VISIT EST AGE 18-39: CPT | Performed by: OBSTETRICS & GYNECOLOGY

## 2025-06-10 RX ORDER — NYSTATIN 100000 [USP'U]/G
POWDER TOPICAL 3 TIMES DAILY
Qty: 30 G | Refills: 3 | Status: SHIPPED | OUTPATIENT
Start: 2025-06-10

## 2025-06-10 NOTE — PROGRESS NOTES
Annual Wellness Visit  Name: Elizabeth Bear      : 1995      MRN: [id number]  Encounter Provider: Reema Johansen MD  Encounter Date: 6/10/2025   Encounter department: [facility] [place]    29 y.o.  yo presents today for her annual exam.:  Assessment & Plan  Encntr for gyn exam (general) (routine) w/o abn findings  Pap due   Mammogram at age 40  Return for annual or PRN.  Pelvic PT brochure given.       Candida infection of flexural skin    Orders:    nystatin (MYCOSTATIN) powder; Apply topically 3 (three) times a day As needed             History of Present Illness     Elizabeth Bear is a 29 y.o. female who presents for annual well woman exam.    She has been well overall since her last annual.  She does sometimes have discomfort on the left side of her vagina during intercourse.  Denies dryness as a current issue.  Discussed pelvic PT vs adjusting positions as options.      GYN:  denies vaginal discharge, labial erythema or lesions; + dyspareunia, occasional as above  Menses are regular  Contraception: condoms  Patient is sexually active with male partner.  Hx gynecologic surgeries: D+C, laparoscopic fulgaration of endometriosis    OB:   female    :  denies dysuria, urinary frequency or urgency.  denies hematuria, flank pain, incontinence.  denies constipation, diarrhea    Breast:  denies breast mass, skin changes, dimpling, reddening, nipple retraction.  denies breast discharge.  Patient does not have a family history of breast, endometrial, or ovarian ca.     General:  ETOH use: denies current  Tobacco use: denies  Recreational drug use: denies    Screening:  Cervical cancer: last pap smear in 2024. Results were normal/negative HPV.  Breast cancer: n/a  Colon cancer: n/a  STD screening: declines.  Depression screening: negative     Review of Systems as per HPI  Medical History Reviewed by provider this encounter:  Tobacco   "Allergies  Meds  Problems  Med Hx  Surg Hx  Fam Hx     .     Objective   /80 (BP Location: Right arm, Patient Position: Sitting, Cuff Size: Standard)   Ht 5' 6\" (1.676 m)   Wt 108 kg (237 lb 6.4 oz)   LMP  (LMP Unknown)   BMI 38.32 kg/m²      Physical Exam  Constitutional:       Appearance: Normal appearance. She is well-developed.   Neck:      Thyroid: No thyromegaly.   Chest:   Breasts:     Breasts are symmetrical.      Right: Normal. No swelling, bleeding, inverted nipple, mass, nipple discharge, skin change or tenderness.      Left: Normal. No swelling, bleeding, inverted nipple, mass, nipple discharge, skin change or tenderness.   Genitourinary:     General: Normal vulva.      Labia:         Right: No rash, tenderness or lesion.         Left: No rash, tenderness or lesion.       Urethra: No prolapse, urethral pain, urethral swelling or urethral lesion.      Vagina: Normal.      Cervix: Normal.      Uterus: Normal. Not enlarged, not fixed and not tender.       Adnexa: Right adnexa normal and left adnexa normal.        Right: No mass or tenderness.          Left: No mass or tenderness.       Musculoskeletal:      Cervical back: Neck supple.   Lymphadenopathy:      Cervical: No cervical adenopathy.      Upper Body:      Right upper body: No axillary adenopathy.      Left upper body: No axillary adenopathy.     Neurological:      Mental Status: She is alert.             "

## 2025-08-08 ENCOUNTER — OFFICE VISIT (OUTPATIENT)
Dept: URGENT CARE | Age: 30
End: 2025-08-08
Payer: COMMERCIAL

## 2025-08-08 VITALS
RESPIRATION RATE: 18 BRPM | OXYGEN SATURATION: 98 % | BODY MASS INDEX: 38.77 KG/M2 | WEIGHT: 240.2 LBS | HEART RATE: 70 BPM | SYSTOLIC BLOOD PRESSURE: 120 MMHG | DIASTOLIC BLOOD PRESSURE: 78 MMHG | TEMPERATURE: 98.2 F

## 2025-08-08 DIAGNOSIS — J45.20 MILD INTERMITTENT ASTHMA WITHOUT COMPLICATION: Primary | ICD-10-CM

## 2025-08-08 DIAGNOSIS — R05.9 COUGH: ICD-10-CM

## 2025-08-08 DIAGNOSIS — J30.2 SEASONAL ALLERGIC RHINITIS, UNSPECIFIED TRIGGER: ICD-10-CM

## 2025-08-08 PROCEDURE — G0382 LEV 3 HOSP TYPE B ED VISIT: HCPCS | Performed by: STUDENT IN AN ORGANIZED HEALTH CARE EDUCATION/TRAINING PROGRAM

## 2025-08-08 PROCEDURE — 96372 THER/PROPH/DIAG INJ SC/IM: CPT | Performed by: STUDENT IN AN ORGANIZED HEALTH CARE EDUCATION/TRAINING PROGRAM

## 2025-08-08 RX ORDER — ALBUTEROL SULFATE 90 UG/1
2 INHALANT RESPIRATORY (INHALATION) EVERY 6 HOURS PRN
Qty: 8.5 G | Refills: 0 | Status: SHIPPED | OUTPATIENT
Start: 2025-08-08

## 2025-08-08 RX ORDER — ALBUTEROL SULFATE 0.83 MG/ML
2.5 SOLUTION RESPIRATORY (INHALATION) EVERY 6 HOURS PRN
Qty: 90 ML | Refills: 0 | Status: SHIPPED | OUTPATIENT
Start: 2025-08-08

## 2025-08-08 RX ORDER — PREDNISONE 20 MG/1
40 TABLET ORAL DAILY
Qty: 10 TABLET | Refills: 0 | Status: SHIPPED | OUTPATIENT
Start: 2025-08-09 | End: 2025-08-14

## 2025-08-08 RX ORDER — METHYLPREDNISOLONE SODIUM SUCCINATE 40 MG/ML
40 INJECTION, POWDER, LYOPHILIZED, FOR SOLUTION INTRAMUSCULAR; INTRAVENOUS ONCE
Status: COMPLETED | OUTPATIENT
Start: 2025-08-08 | End: 2025-08-08

## 2025-08-08 RX ADMIN — METHYLPREDNISOLONE SODIUM SUCCINATE 40 MG: 40 INJECTION, POWDER, LYOPHILIZED, FOR SOLUTION INTRAMUSCULAR; INTRAVENOUS at 08:33

## (undated) DEVICE — INTENDED FOR TISSUE SEPARATION, AND OTHER PROCEDURES THAT REQUIRE A SHARP SURGICAL BLADE TO PUNCTURE OR CUT.: Brand: BARD-PARKER SAFETY BLADES SIZE 11, STERILE

## (undated) DEVICE — 40595 XL TRENDELENBURG POSITIONING KIT: Brand: 40595 XL TRENDELENBURG POSITIONING KIT

## (undated) DEVICE — DRAPE EQUIPMENT RF WAND

## (undated) DEVICE — LAPAROSCOPIC TROCAR SLEEVE/SINGLE USE: Brand: KII® OPTICAL ACCESS SYSTEM

## (undated) DEVICE — 3M™ STERI-STRIP™ REINFORCED ADHESIVE SKIN CLOSURES, R1547, 1/2 IN X 4 IN (12 MM X 100 MM), 6 STRIPS/ENVELOPE: Brand: 3M™ STERI-STRIP™

## (undated) DEVICE — ENDOPATH PNEUMONEEDLE INSUFFLATION NEEDLES WITH LUER LOCK CONNECTORS 120MM: Brand: ENDOPATH

## (undated) DEVICE — APPLICATOR ENDO SURGICEL

## (undated) DEVICE — GLOVE SRG LF STRL BGL SKNSNS 6.5 PF

## (undated) DEVICE — IRRIG ENDO FLO TUBING

## (undated) DEVICE — TOWEL SURG XR DETECT GREEN STRL RFD

## (undated) DEVICE — HEMOSTAT POWDER ADSORB SURGICEL 3GM

## (undated) DEVICE — GLOVE INDICATOR PI UNDERGLOVE SZ 6.5 BLUE

## (undated) DEVICE — TRAY FOLEY 16FR URIMETER SILICONE SURESTEP

## (undated) DEVICE — SUT MONOCRYL 4-0 PS-2 18 IN Y496G

## (undated) DEVICE — ADHESIVE SKIN HIGH VISCOSITY EXOFIN 1ML

## (undated) DEVICE — BETHLEHEM UNIVERSAL GYN LAP PK: Brand: CARDINAL HEALTH